# Patient Record
Sex: MALE | Race: WHITE | NOT HISPANIC OR LATINO | Employment: FULL TIME | URBAN - METROPOLITAN AREA
[De-identification: names, ages, dates, MRNs, and addresses within clinical notes are randomized per-mention and may not be internally consistent; named-entity substitution may affect disease eponyms.]

---

## 2017-02-28 ENCOUNTER — HOSPITAL ENCOUNTER (EMERGENCY)
Facility: HOSPITAL | Age: 37
Discharge: HOME | End: 2017-02-28
Admitting: EMERGENCY MEDICINE
Payer: COMMERCIAL

## 2017-02-28 ENCOUNTER — APPOINTMENT (EMERGENCY)
Dept: RADIOLOGY | Facility: HOSPITAL | Age: 37
End: 2017-02-28
Payer: COMMERCIAL

## 2017-02-28 VITALS
TEMPERATURE: 98.2 F | DIASTOLIC BLOOD PRESSURE: 78 MMHG | RESPIRATION RATE: 19 BRPM | OXYGEN SATURATION: 100 % | SYSTOLIC BLOOD PRESSURE: 135 MMHG | HEART RATE: 81 BPM | WEIGHT: 218.03 LBS

## 2017-02-28 DIAGNOSIS — R07.89 CHEST WALL PAIN: Primary | ICD-10-CM

## 2017-02-28 LAB
ATRIAL RATE: 71 BPM
P AXIS: 50 DEGREES
PR INTERVAL: 142 MS
QRS AXIS: 24 DEGREES
QRSD INTERVAL: 84 MS
QT INTERVAL: 400 MS
QTC INTERVAL: 434 MS
T WAVE AXIS: 14 DEGREES
VENTRICULAR RATE: 71 BPM

## 2017-02-28 PROCEDURE — 93005 ELECTROCARDIOGRAM TRACING: CPT

## 2017-02-28 PROCEDURE — 99285 EMERGENCY DEPT VISIT HI MDM: CPT

## 2017-02-28 PROCEDURE — 71020 HB CHEST X-RAY 2VW FRONTAL&LATL: CPT

## 2017-02-28 PROCEDURE — 93005 ELECTROCARDIOGRAM TRACING: CPT | Performed by: PHYSICIAN ASSISTANT

## 2017-02-28 RX ORDER — CLONAZEPAM 0.5 MG/1
0.5 TABLET ORAL 2 TIMES DAILY PRN
COMMUNITY
End: 2018-02-26 | Stop reason: SDUPTHER

## 2017-02-28 RX ORDER — ESCITALOPRAM OXALATE 20 MG/1
20 TABLET ORAL DAILY
COMMUNITY
End: 2018-05-02 | Stop reason: SDUPTHER

## 2017-02-28 RX ORDER — OMEPRAZOLE 20 MG/1
20 CAPSULE, DELAYED RELEASE ORAL DAILY
Qty: 30 CAPSULE | Refills: 0 | Status: SHIPPED | OUTPATIENT
Start: 2017-02-28 | End: 2018-05-02 | Stop reason: CLARIF

## 2017-09-05 ENCOUNTER — GENERIC CONVERSION - ENCOUNTER (OUTPATIENT)
Dept: OTHER | Facility: OTHER | Age: 37
End: 2017-09-05

## 2017-10-24 ENCOUNTER — ALLSCRIPTS OFFICE VISIT (OUTPATIENT)
Dept: OTHER | Facility: OTHER | Age: 37
End: 2017-10-24

## 2017-10-24 DIAGNOSIS — R07.89 OTHER CHEST PAIN: ICD-10-CM

## 2017-10-24 DIAGNOSIS — F41.9 ANXIETY DISORDER: ICD-10-CM

## 2017-10-24 DIAGNOSIS — E78.2 MIXED HYPERLIPIDEMIA: ICD-10-CM

## 2017-10-25 NOTE — PROGRESS NOTES
Assessment  1  Hyperlipemia, mixed (272 2) (E78 2)   2  Anxiety disorder (300 00) (F41 9)   3  Chest wall pain (786 52) (R07 89)    Plan  Anxiety disorder    · (1) TSH; Status:Active; Requested [id number];    · (1) VITAMIN D 125-DIHYDROXY (CALCITRIOL); Status:Active; Requested [id number];   Chest wall pain    · (1) CK (CPK); Status:Active; Requested [id number];    · (1) TROPONIN I; Status:Active; Requested [id number];    · ECHO STRESS TEST W CONTRAST IF INDICATED; Status:Need Information - Financial  Authorization; Requested [id number];   Hyperlipemia, mixed    · (1) CBC/PLT/DIFF; Status:Active; Requested [id number];    · (1) COMPREHENSIVE METABOLIC PANEL; Status:Active; Requested [id number];    · (1) LIPID PANEL, FASTING; Status:Active; Requested [id number];    · (1) URINALYSIS (will reflex a microscopy if leukocytes, occult blood, protein or nitrites are  not within normal limits); Status:Active; Requested [id number];    · Follow-up visit in 2 weeks Evaluation and Treatment  Follow-up  Status: Hold For -  Scheduling  Requested for: 90KJM1290    Discussion/Summary  Discussion Summary:   Hldobtain base line labs , recommend dietary modifications , increase fiber , weight loss / exercise program , encouraged to research Mediterranean diet , discussed medicaitotns , omega 3's , fiber supplements, statin medications, reviewed goals of treatmentunder care of psych provider wall pain enl , discussed causative factors , muscle strain   Counseling Documentation With Imm: The patient was counseled regarding instructions for management,-- patient and family education,-- importance of compliance with treatment  Medication SE Review and Pt Understands Tx: Possible side effects of new medications were reviewed with the patient/guardian today  The treatment plan was reviewed with the patient/guardian   The patient/guardian understands and agrees with the treatment plan      Chief Complaint  Chief Complaint Free Text Note Form: - patient is here to establish today  History of Present Illness  HPI: here to establish in law of Reba Hays in the ER in feb for chest pain , here to f/u , did f/u on this in regard to chol  , in the past did have complete work up with echo, stress test all normal time checked chol yrs ago 3? was driving to work developed left sided chest pain again like in feb , admits was doing a bit of chainsaw work with ritu over the weekend, no problems then was definitely sore after cervantes, ,is reproducible with cough hand dominant , has not been a ledy , just had a miscarriage, has one child boy , has been stressful too , works in [employer] had anxiety issue since 25yrs old , sees psych in [place] goes regularly q 3m         Review of Systems  Complete-Male:   Constitutional: No fever or chills, feels well, no tiredness, no recent weight gain or weight loss  Eyes: No complaints of eye pain, no red eyes, no discharge from eyes, no itchy eyes  ENT: no complaints of earache, no hearing loss, no nosebleeds, no nasal discharge, no sore throat, no hoarseness  Cardiovascular: No complaints of slow heart rate, no fast heart rate, no chest pain, no palpitations, no leg claudication, no lower extremity  Respiratory: No complaints of shortness of breath, no wheezing, no cough, no SOB on exertion, no orthopnea or PND  Gastrointestinal: No complaints of abdominal pain, no constipation, no nausea or vomiting, no diarrhea or bloody stools  Genitourinary: No complaints of dysuria, no incontinence, no hesitancy, no nocturia, no genital lesion, no testicular pain  Musculoskeletal: No complaints of arthralgia, no myalgias, no joint swelling or stiffness, no limb pain or swelling  Integumentary: No complaints of skin rash or skin lesions, no itching, no skin wound, no dry skin     Neurological: No compliants of headache, no confusion, no convulsions, no numbness or tingling, no dizziness or fainting, no limb weakness, no difficulty walking  Psychiatric: Is not suicidal, no sleep disturbances, no anxiety or depression, no change in personality, no emotional problems  Endocrine: No complaints of proptosis, no hot flashes, no muscle weakness, no erectile dysfunction, no deepening of the voice, no feelings of weakness  Hematologic/Lymphatic: No complaints of swollen glands, no swollen glands in the neck, does not bleed easily, no easy bruising  ROS Reviewed:   ROS reviewed  Active Problems  1  Anxiety disorder (300 00) (F41 9)    Past Medical History  1  History of Frequent diarrhea (787 91) (R19 7)   2  History of back pain (V13 59) (Z87 39)   3  History of bloody stools (V12 79) (Z87 19)   4  History of hemorrhoids (V13 89) (Z87 19)  Active Problems And Past Medical History Reviewed: The active problems and past medical history were reviewed and updated today  Surgical History  Surgical History Reviewed: The surgical history was reviewed and updated today  Family History  Mother    1  Family history of hypertension (V17 49) (Z82 49)   2  Family history of High cholesterol  Grandmother    3  Family history of cardiac disorder (V17 49) (Z82 49)   4  Family history of hypertension (V17 49) (Z82 49)   5  Family history of High cholesterol  Grandfather    6  Family history of cardiac disorder (V17 49) (Z82 49)   7  Family history of hypertension (V17 49) (Z82 49)   8  Family history of High cholesterol  Family History Reviewed: The family history was reviewed and updated today  Social History   · Always uses seat belt   · Consumes alcohol occasionally (V49 89) (Z78 9)   · Daily caffeine consumption, 2-3 servings a day   · Employed   · Lives with children   · Lives with spouse   ·    · No illicit drug use   · Non smoker / tobacco user (V49 89) (Z78 9)  Social History Reviewed: The social history was reviewed and updated today   The social history was reviewed and is unchanged  Current Meds   1  KlonoPIN 0 5 MG Oral Tablet; TAKE 1 TABLET TWICE DAILY AS NEEDED; Therapy: (Recorded:09Ivv0767) to Recorded   2  Lexapro 20 MG Oral Tablet; TAKE 1 TABLET DAILY; Therapy: (Recorded:24Oct2017) to Recorded    Allergies  1  Penicillins  2  No Known Environmental Allergies   3  No Known Food Allergies    Vitals  Vital Signs    Recorded: 80ESH6531 01:35PM Recorded: 16GAM8492 12:58PM   Heart Rate  68   Respiration  16   Systolic 372 944   Diastolic 78 84   Height  5 ft 8 in   Weight  210 lb 6 oz   BMI Calculated  31 99   BSA Calculated  2 09   O2 Saturation  98     Physical Exam    Constitutional   General appearance: No acute distress, well appearing and well nourished  comfortable,-- overweight-- and-- appearance reflects stated age  Eyes   Conjunctiva and lids: No swelling, erythema, or discharge  Pupils and irises: Equal, round and reactive to light  Ears, Nose, Mouth, and Throat   External inspection of ears and nose: Normal     Nasal mucosa, septum, and turbinates: Normal without edema or erythema  Pulmonary   Respiratory effort: No increased work of breathing or signs of respiratory distress  Auscultation of lungs: Clear to auscultation, equal breath sounds bilaterally, no wheezes, no rales, no rhonci  Cardiovascular   Auscultation of heart: Normal rate and rhythm, normal S1 and S2, without murmurs  Carotid pulses: Normal     Abdomen   Abdomen: Non-tender, no masses  Liver and spleen: No hepatomegaly or splenomegaly  Lymphatic   Palpation of lymph nodes in neck: No lymphadenopathy  Musculoskeletal   Gait and station: Normal  -- pt tenderness left upper chest wall  Digits and nails: Normal without clubbing or cyanosis  Inspection/palpation of joints, bones, and muscles: Normal  -- FROM  Psychiatric   Orientation to person, place and time: Normal     Mood and affect: Normal          Signatures   Electronically signed by :  Madalyn Goldmann, CRNP; Oct 24 2017  2:00PM EST                       (Author)    Electronically signed by : MOLINA Abrams ; Oct 24 2017  2:33PM EST

## 2017-10-28 ENCOUNTER — APPOINTMENT (OUTPATIENT)
Dept: LAB | Facility: CLINIC | Age: 37
End: 2017-10-28
Payer: COMMERCIAL

## 2017-10-28 DIAGNOSIS — R07.89 OTHER CHEST PAIN: ICD-10-CM

## 2017-10-28 DIAGNOSIS — E78.2 MIXED HYPERLIPIDEMIA: ICD-10-CM

## 2017-10-28 DIAGNOSIS — F41.9 ANXIETY DISORDER: ICD-10-CM

## 2017-10-28 LAB
ALBUMIN SERPL BCP-MCNC: 4.1 G/DL (ref 3.5–5)
ALP SERPL-CCNC: 67 U/L (ref 46–116)
ALT SERPL W P-5'-P-CCNC: 38 U/L (ref 12–78)
ANION GAP SERPL CALCULATED.3IONS-SCNC: 6 MMOL/L (ref 4–13)
AST SERPL W P-5'-P-CCNC: 22 U/L (ref 5–45)
BACTERIA UR QL AUTO: ABNORMAL /HPF
BASOPHILS # BLD AUTO: 0.02 THOUSANDS/ΜL (ref 0–0.1)
BASOPHILS NFR BLD AUTO: 0 % (ref 0–1)
BILIRUB SERPL-MCNC: 0.25 MG/DL (ref 0.2–1)
BILIRUB UR QL STRIP: NEGATIVE
BUN SERPL-MCNC: 13 MG/DL (ref 5–25)
CALCIUM SERPL-MCNC: 8.9 MG/DL (ref 8.3–10.1)
CHLORIDE SERPL-SCNC: 106 MMOL/L (ref 100–108)
CHOLEST SERPL-MCNC: 155 MG/DL (ref 50–200)
CK SERPL-CCNC: 103 U/L (ref 39–308)
CLARITY UR: ABNORMAL
CO2 SERPL-SCNC: 26 MMOL/L (ref 21–32)
COLOR UR: YELLOW
CREAT SERPL-MCNC: 0.89 MG/DL (ref 0.6–1.3)
EOSINOPHIL # BLD AUTO: 1.15 THOUSAND/ΜL (ref 0–0.61)
EOSINOPHIL NFR BLD AUTO: 13 % (ref 0–6)
ERYTHROCYTE [DISTWIDTH] IN BLOOD BY AUTOMATED COUNT: 12 % (ref 11.6–15.1)
GFR SERPL CREATININE-BSD FRML MDRD: 111 ML/MIN/1.73SQ M
GLUCOSE P FAST SERPL-MCNC: 90 MG/DL (ref 65–99)
GLUCOSE UR STRIP-MCNC: NEGATIVE MG/DL
HCT VFR BLD AUTO: 43.7 % (ref 36.5–49.3)
HDLC SERPL-MCNC: 24 MG/DL (ref 40–60)
HGB BLD-MCNC: 15 G/DL (ref 12–17)
HGB UR QL STRIP.AUTO: ABNORMAL
HYALINE CASTS #/AREA URNS LPF: ABNORMAL /LPF
KETONES UR STRIP-MCNC: NEGATIVE MG/DL
LEUKOCYTE ESTERASE UR QL STRIP: NEGATIVE
LYMPHOCYTES # BLD AUTO: 3.23 THOUSANDS/ΜL (ref 0.6–4.47)
LYMPHOCYTES NFR BLD AUTO: 35 % (ref 14–44)
MCH RBC QN AUTO: 29.5 PG (ref 26.8–34.3)
MCHC RBC AUTO-ENTMCNC: 34.3 G/DL (ref 31.4–37.4)
MCV RBC AUTO: 86 FL (ref 82–98)
MONOCYTES # BLD AUTO: 0.49 THOUSAND/ΜL (ref 0.17–1.22)
MONOCYTES NFR BLD AUTO: 5 % (ref 4–12)
NEUTROPHILS # BLD AUTO: 4.27 THOUSANDS/ΜL (ref 1.85–7.62)
NEUTS SEG NFR BLD AUTO: 47 % (ref 43–75)
NITRITE UR QL STRIP: NEGATIVE
NON-SQ EPI CELLS URNS QL MICRO: ABNORMAL /HPF
NRBC BLD AUTO-RTO: 0 /100 WBCS
PH UR STRIP.AUTO: 6 [PH] (ref 4.5–8)
PLATELET # BLD AUTO: 285 THOUSANDS/UL (ref 149–390)
PMV BLD AUTO: 10.8 FL (ref 8.9–12.7)
POTASSIUM SERPL-SCNC: 4.1 MMOL/L (ref 3.5–5.3)
PROT SERPL-MCNC: 7.9 G/DL (ref 6.4–8.2)
PROT UR STRIP-MCNC: NEGATIVE MG/DL
RBC # BLD AUTO: 5.09 MILLION/UL (ref 3.88–5.62)
RBC #/AREA URNS AUTO: ABNORMAL /HPF
SODIUM SERPL-SCNC: 138 MMOL/L (ref 136–145)
SP GR UR STRIP.AUTO: 1.02 (ref 1–1.03)
TRIGL SERPL-MCNC: 474 MG/DL
TROPONIN I SERPL-MCNC: <0.02 NG/ML
TSH SERPL DL<=0.05 MIU/L-ACNC: 1.67 UIU/ML (ref 0.36–3.74)
UROBILINOGEN UR QL STRIP.AUTO: 0.2 E.U./DL
WBC # BLD AUTO: 9.18 THOUSAND/UL (ref 4.31–10.16)
WBC #/AREA URNS AUTO: ABNORMAL /HPF

## 2017-10-28 PROCEDURE — 80061 LIPID PANEL: CPT

## 2017-10-28 PROCEDURE — 36415 COLL VENOUS BLD VENIPUNCTURE: CPT

## 2017-10-28 PROCEDURE — 82652 VIT D 1 25-DIHYDROXY: CPT

## 2017-10-28 PROCEDURE — 80053 COMPREHEN METABOLIC PANEL: CPT

## 2017-10-28 PROCEDURE — 81001 URINALYSIS AUTO W/SCOPE: CPT

## 2017-10-28 PROCEDURE — 84484 ASSAY OF TROPONIN QUANT: CPT

## 2017-10-28 PROCEDURE — 85025 COMPLETE CBC W/AUTO DIFF WBC: CPT

## 2017-10-28 PROCEDURE — 84443 ASSAY THYROID STIM HORMONE: CPT

## 2017-10-28 PROCEDURE — 82550 ASSAY OF CK (CPK): CPT

## 2017-10-31 ENCOUNTER — GENERIC CONVERSION - ENCOUNTER (OUTPATIENT)
Dept: OTHER | Facility: OTHER | Age: 37
End: 2017-10-31

## 2017-10-31 LAB — 1,25(OH)2D3 SERPL-MCNC: 34.6 PG/ML (ref 19.9–79.3)

## 2017-11-01 ENCOUNTER — GENERIC CONVERSION - ENCOUNTER (OUTPATIENT)
Dept: OTHER | Facility: OTHER | Age: 37
End: 2017-11-01

## 2017-11-07 ENCOUNTER — GENERIC CONVERSION - ENCOUNTER (OUTPATIENT)
Dept: OTHER | Facility: OTHER | Age: 37
End: 2017-11-07

## 2017-11-15 ENCOUNTER — GENERIC CONVERSION - ENCOUNTER (OUTPATIENT)
Dept: OTHER | Facility: OTHER | Age: 37
End: 2017-11-15

## 2017-12-14 ENCOUNTER — GENERIC CONVERSION - ENCOUNTER (OUTPATIENT)
Dept: OTHER | Facility: OTHER | Age: 37
End: 2017-12-14

## 2018-01-11 NOTE — RESULT NOTES
Verified Results  (1) COMPREHENSIVE METABOLIC PANEL 58SBS2198 88:08MM Humaira Brown Order Number: [id number]     Test Name Result Flag Reference   SODIUM 138 mmol/L  136-145   POTASSIUM 4 1 mmol/L  3 5-5 3   CHLORIDE 106 mmol/L  100-108   CARBON DIOXIDE 26 mmol/L  21-32   ANION GAP (CALC) 6 mmol/L  4-13   BLOOD UREA NITROGEN 13 mg/dL  5-25   CREATININE 0 89 mg/dL  0 60-1 30   Standardized to IDMS reference method   CALCIUM 8 9 mg/dL  8 3-10 1   BILI, TOTAL 0 25 mg/dL  0 20-1 00   ALK PHOSPHATAS 67 U/L     ALT (SGPT) 38 U/L  12-78   Specimen collection should occur prior to Sulfasalazine and/or Sulfapyridine administration due to the potential for falsely depressed results  AST(SGOT) 22 U/L  5-45   Specimen collection should occur prior to Sulfasalazine administration due to the potential for falsely depressed results  ALBUMIN 4 1 g/dL  3 5-5 0   TOTAL PROTEIN 7 9 g/dL  6 4-8 2   eGFR 111 ml/min/1 73sq [facility] Disease Education Program recommendations are as follows:  GFR calculation is accurate only with a steady state creatinine  Chronic Kidney disease less than 60 ml/min/1 73 sq  meters  Kidney failure less than 15 ml/min/1 73 sq  meters  GLUCOSE FASTING 90 mg/dL  65-99   Specimen collection should occur prior to Sulfasalazine administration due to the potential for falsely depressed results  Specimen collection should occur prior to Sulfapyridine administration due to the potential for falsely elevated results  (1) LIPID PANEL, FASTING 58ZYS4923 11:46AM Humaira Brown Order Number: [id number]     Test Name Result Flag Reference   CHOLESTEROL 155 mg/dL     HDL,DIRECT 24 mg/dL L 40-60   Specimen collection should occur prior to Metamizole administration due to the potential for falsley depressed results     LDL CHOLESTEROL CALCULATED (Report)  0-100   Calculated LDL invalid, triglycerides >400 mg/dl      Triglyceride:      Normal <150 mg/dl Borderline High 150-199 mg/dl   High 200-499 mg/dl   Very High >499 mg/dl      Cholesterol:     Desirable <200 mg/dl    Borderline High 200-239 mg/dl    High >239 mg/dl      HDL Cholesterol:     High>59 mg/dL    Low <41 mg/dL   Calculated LDL invalid, triglycerides >400 mg/dl      Triglyceride:        Normal <150 mg/dl   Borderline High 150-199 mg/dl   High 200-499 mg/dl   Very High >499 mg/dl      Cholesterol:       Desirable <200 mg/dl    Borderline High 200-239 mg/dl    High >239 mg/dl      HDL Cholesterol:       High>59 mg/dL    Low <41 mg/dL   TRIGLYCERIDES 474 mg/dL H <=150   Specimen collection should occur prior to N-Acetylcysteine or Metamizole administration due to the potential for falsely depressed results  (1) CBC/PLT/DIFF 28Oct2017 11:46AM Ted Chaudhary Order Number: [id number]     Test Name Result Flag Reference   WBC COUNT 9 18 Thousand/uL  4 31-10 16   RBC COUNT 5 09 Million/uL  3 88-5 62   HEMOGLOBIN 15 0 g/dL  12 0-17 0   HEMATOCRIT 43 7 %  36 5-49 3   MCV 86 fL  82-98   MCH 29 5 pg  26 8-34 3   MCHC 34 3 g/dL  31 4-37 4   RDW 12 0 %  11 6-15 1   MPV 10 8 fL  8 9-12 7   PLATELET COUNT 583 Thousands/uL  149-390   nRBC AUTOMATED 0 /100 WBCs     NEUTROPHILS RELATIVE PERCENT 47 %  43-75   LYMPHOCYTES RELATIVE PERCENT 35 %  14-44   MONOCYTES RELATIVE PERCENT 5 %  4-12   EOSINOPHILS RELATIVE PERCENT 13 % H 0-6   BASOPHILS RELATIVE PERCENT 0 %  0-1   NEUTROPHILS ABSOLUTE COUNT 4 27 Thousands/? ??L  1 85-7 62   LYMPHOCYTES ABSOLUTE COUNT 3 23 Thousands/? ??L  0 60-4 47   MONOCYTES ABSOLUTE COUNT 0 49 Thousand/? ??L  0 17-1 22   EOSINOPHILS ABSOLUTE COUNT 1 15 Thousand/? ??L H 0 00-0 61   BASOPHILS ABSOLUTE COUNT 0 02 Thousands/? ??L  0 00-0 10     (1) URINALYSIS (will reflex a microscopy if leukocytes, occult blood, protein or nitrites are not within normal limits) 28Oct2017 11:46AM Ted Chaudhary Order Number: [id number]     Test Name Result Flag Reference   COLOR Yellow CLARITY Turbid     SPECIFIC GRAVITY UA 1 025  1 003-1 030   PH UA 6 0  4 5-8 0   LEUKOCYTE ESTERASE UA Negative  Negative   NITRITE UA Negative  Negative   PROTEIN UA Negative mg/dl  Negative   GLUCOSE UA Negative mg/dl  Negative   KETONES UA Negative mg/dl  Negative   UROBILINOGEN UA 0 2 E U /dl  0 2, 1 0 E U /dl   BILIRUBIN UA Negative  Negative   BLOOD UA Trace A Negative   BACTERIA None Seen /hpf  None Seen, Occasional   EPITHELIAL CELLS None Seen /hpf  None Seen, Occasional   HYALINE CASTS None Seen /lpf  None Seen   RBC UA 2-4 /hpf A None Seen, 0-5   WBC UA None Seen /hpf  None Seen, 0-5, 5-55, 5-65     (1) TSH 28Oct2017 11:46AM Elinda Held Order Number: [id number]     Test Name Result Flag Reference   TSH 1 670 uIU/mL  0 358-3 740   Patients undergoing fluorescein dye angiography may retain small amounts of fluorescein in the body for 48-72 hours post procedure  Samples containing fluorescein can produce falsely depressed TSH values  If the patient had this procedure,a specimen should be resubmitted post fluorescein clearance  (1) TROPONIN I 28Oct2017 11:46AM Elinda Held Order Number: [id number]     Test Name Result Flag Reference   TROPONIN I <0 02 ng/mL  <=0 04   [employer] Chemistry analyzer 99% cutoff is > 0 04 ng/mL in network labs    o cTnI 99% cutoff is useful only when applied to patients in the clinical setting of myocardial ischemia  o cTnI 99% cutoff should be interpreted in the context of clinical history, ECG findings and possibly cardiac imaging to establish correct diagnosis  o cTnI 99% cutoff may be suggestive but clearly not indicative of a coronary event without the clinical setting of myocardial ischemia       (1) CK (CPK) 28Oct2017 11:46AM Elinda Held Order Number: [id number]     Test Name Result Flag Reference   CK (CPK) 103 U/L

## 2018-01-13 VITALS
HEIGHT: 68 IN | HEART RATE: 68 BPM | DIASTOLIC BLOOD PRESSURE: 78 MMHG | OXYGEN SATURATION: 98 % | BODY MASS INDEX: 31.89 KG/M2 | RESPIRATION RATE: 16 BRPM | SYSTOLIC BLOOD PRESSURE: 112 MMHG | WEIGHT: 210.38 LBS

## 2018-01-15 ENCOUNTER — ALLSCRIPTS OFFICE VISIT (OUTPATIENT)
Dept: OTHER | Facility: OTHER | Age: 38
End: 2018-01-15

## 2018-01-16 NOTE — PROGRESS NOTES
Assessment   1  Anxiety disorder (300 00) (F41 9)   2  Hyperlipemia, mixed (272 2) (E78 2)    Plan   Health Maintenance    · Stop: Influenza    Discussion/Summary      Anxiety next plan of care encouraged to continue Lexapro, continues to work at decreasing the need for the clonazepam, given number for therapist need area will need to contact  labs at length , recommend dietary modifications , increase fiber , weight loss / exercise program , encouraged to research Mediterranean diet , discussed medicaitotns , omega 3's , fiber supplements, statin medications, reviewed goals of treatment      The patient was counseled regarding instructions for management,-- patient and family education,-- importance of compliance with treatment  Possible side effects of new medications were reviewed with the patient/guardian today  The treatment plan was reviewed with the patient/guardian  The patient/guardian understands and agrees with the treatment plan      Chief Complaint   Patient is in the office today for a follow up visit  History of Present Illness   Here to follow up on anxiety been taking med , down to twice a day , has been only cutting morning dose to half a tab  to take Lexapro not had contacted a counselor or therapist to the lack of time overall improvement, happy that the need for the Klonopin is decreased feeling less depending, has bottle of meds not use for this months continues taking cholesterol medicine this is due to have blood work done next month negative issue with medication diet working on      Review of Systems        Constitutional: No fever or chills, feels well, no tiredness, no recent weight gain or weight loss  Eyes: No complaints of eye pain, no red eyes, no discharge from eyes, no itchy eyes  ENT: no complaints of earache, no hearing loss, no nosebleeds, no nasal discharge, no sore throat, no hoarseness        Cardiovascular: No complaints of slow heart rate, no fast heart rate, no chest pain, no palpitations, no leg claudication, no lower extremity  Respiratory: No complaints of shortness of breath, no wheezing, no cough, no SOB on exertion, no orthopnea or PND  Gastrointestinal: No complaints of abdominal pain, no constipation, no nausea or vomiting, no diarrhea or bloody stools  Genitourinary: No complaints of dysuria, no incontinence, no hesitancy, no nocturia, no genital lesion, no testicular pain  Musculoskeletal: No complaints of arthralgia, no myalgias, no joint swelling or stiffness, no limb pain or swelling  Integumentary: No complaints of skin rash or skin lesions, no itching, no skin wound, no dry skin  Neurological: No compliants of headache, no confusion, no convulsions, no numbness or tingling, no dizziness or fainting, no limb weakness, no difficulty walking  Psychiatric: Is not suicidal, no sleep disturbances, no anxiety or depression, no change in personality, no emotional problems  Endocrine: No complaints of proptosis, no hot flashes, no muscle weakness, no erectile dysfunction, no deepening of the voice, no feelings of weakness  Hematologic/Lymphatic: No complaints of swollen glands, no swollen glands in the neck, does not bleed easily, no easy bruising  ROS reviewed  Active Problems   1  Acute URI (465 9) (J06 9)   2  Anxiety disorder (300 00) (F41 9)   3  Chest wall pain (786 52) (R07 89)   4  Environmental allergies (V15 09) (Z91 09)   5  Hyperlipemia, mixed (272 2) (E78 2)    Past Medical History   1  History of Frequent diarrhea (787 91) (R19 7)   2  History of back pain (V13 59) (Z87 39)   3  History of bloody stools (V12 79) (Z87 19)   4  History of hemorrhoids (V13 89) (Z87 19)     The active problems and past medical history were reviewed and updated today  Surgical History      The surgical history was reviewed and updated today  Family History   Mother    1   Family history of hypertension (V17 49) (Z82 49)   2  Family history of High cholesterol  Grandmother    3  Family history of cardiac disorder (V17 49) (Z82 49)   4  Family history of hypertension (V17 49) (Z82 49)   5  Family history of High cholesterol  Grandfather    6  Family history of cardiac disorder (V17 49) (Z82 49)   7  Family history of hypertension (V17 49) (Z82 49)   8  Family history of High cholesterol     The family history was reviewed and updated today  Social History    · Always uses seat belt   · Consumes alcohol occasionally (V49 89) (Z78 9)   · Daily caffeine consumption, 2-3 servings a day   · Employed   · Former smoker (V15 82) (M69 145)   · Lives with children   · Lives with spouse   ·    · No illicit drug use   · Non smoker / tobacco user (V49 89) (Z78 9)  The social history was reviewed and updated today  The social history was reviewed and is unchanged  Current Meds    1  Atorvastatin Calcium 20 MG Oral Tablet; TAKE 1 TABLET AT BEDTIME; Therapy: 67NGI7360 to (Evaluate:25Jun2018)  Requested for: 46Iaj7269; Last     Rx:[id number] Ordered   2  ClonazePAM 0 5 MG Oral Tablet; TAKE 1 TABLET TWICE DAILY AS NEEDED; Last     Rx:[id number] Ordered   3  Escitalopram Oxalate 20 MG Oral Tablet; TAKE 1 TABLET DAILY  Requested for:     24RHB5653; Last Rx:[id number] Ordered   4  Fenofibrate Micronized 134 MG Oral Capsule; take 1 capsule daily; Therapy: 48XOI0745 to (Evaluate:12Suv0222)  Requested for: 99Pnk3827; Last     Rx:[id number] Ordered   5  Montelukast Sodium 10 MG Oral Tablet; TAKE 1 TABLET DAILY; Therapy: 77LFZ3208 to (Evaluate:25Jun2018)  Requested for: ; Last     Rx:[id number] Ordered     The medication list was reviewed and updated today  Allergies   1  Penicillins  2  No Known Environmental Allergies   3   No Known Food Allergies    Vitals   Vital Signs    Recorded: 04VJU0737 04:58PM   Temperature 97 3 F   Heart Rate 81   Respiration 16   Systolic 435   Diastolic 84 Height 5 ft 8 in   Weight 214 lb 2 oz   BMI Calculated 32 56   BSA Calculated 2 1   O2 Saturation 98     Physical Exam        Constitutional      General appearance: No acute distress, well appearing and well nourished  Eyes      Conjunctiva and lids: No swelling, erythema, or discharge  Pupils and irises: Equal, round and reactive to light  Ears, Nose, Mouth, and Throat      External inspection of ears and nose: Normal        Otoscopic examination: Tympanic membrance translucent with normal light reflex  Canals patent without erythema  Nasal mucosa, septum, and turbinates: Normal without edema or erythema  Oropharynx: Normal with no erythema, edema, exudate or lesions  Pulmonary      Respiratory effort: No increased work of breathing or signs of respiratory distress  Auscultation of lungs: Clear to auscultation, equal breath sounds bilaterally, no wheezes, no rales, no rhonci  Cardiovascular      Palpation of heart: Normal PMI, no thrills  Auscultation of heart: Normal rate and rhythm, normal S1 and S2, without murmurs  Examination of extremities for edema and/or varicosities: Normal        Carotid pulses: Normal        Abdomen      Abdomen: Non-tender, no masses  Liver and spleen: No hepatomegaly or splenomegaly  Lymphatic      Palpation of lymph nodes in neck: No lymphadenopathy  Musculoskeletal      Gait and station: Normal        Digits and nails: Normal without clubbing or cyanosis  Inspection/palpation of joints, bones, and muscles: Normal        Skin      Skin and subcutaneous tissue: Normal without rashes or lesions  Neurologic      Cranial nerves: Cranial nerves 2-12 intact  Reflexes: 2+ and symmetric  Sensation: No sensory loss         Psychiatric      Orientation to person, place and time: Normal        Mood and affect: Normal           Future Appointments      Date/Time Provider Specialty Site 03/14/2018 06:00 PM Lion Olvera, [address] [facility]     Signatures    Electronically signed by :  FEROZ Fatima; Jaren 15 2018  5:59PM EST                       (Author)     Electronically signed by : MOLINA Lynn ; Jaren 15 2018  7:02PM EST

## 2018-01-17 NOTE — RESULT NOTES
Verified Results  (1) VITAMIN D 125-DIHYDROXY (CALCITRIOL) 28Oct2017 11:46AM Alvina Poisson Order Number: [id number]     Test Name Result Flag Reference   [id number] 34 6 pg/mL  19 9 - 79 3   Performed at:  [address]  [id number]  : Chloe Sinclair MD, Phone:  [phone]

## 2018-01-22 VITALS
SYSTOLIC BLOOD PRESSURE: 124 MMHG | BODY MASS INDEX: 31.22 KG/M2 | RESPIRATION RATE: 16 BRPM | HEIGHT: 68 IN | TEMPERATURE: 97.6 F | WEIGHT: 206 LBS | DIASTOLIC BLOOD PRESSURE: 86 MMHG | OXYGEN SATURATION: 98 % | HEART RATE: 74 BPM

## 2018-01-22 VITALS
BODY MASS INDEX: 31.83 KG/M2 | OXYGEN SATURATION: 95 % | HEART RATE: 78 BPM | HEIGHT: 68 IN | RESPIRATION RATE: 20 BRPM | WEIGHT: 210.03 LBS | DIASTOLIC BLOOD PRESSURE: 86 MMHG | TEMPERATURE: 99.2 F | SYSTOLIC BLOOD PRESSURE: 128 MMHG

## 2018-01-23 VITALS
SYSTOLIC BLOOD PRESSURE: 130 MMHG | DIASTOLIC BLOOD PRESSURE: 84 MMHG | BODY MASS INDEX: 32.45 KG/M2 | HEIGHT: 68 IN | TEMPERATURE: 97.3 F | HEART RATE: 81 BPM | WEIGHT: 214.13 LBS | OXYGEN SATURATION: 98 % | RESPIRATION RATE: 16 BRPM

## 2018-01-24 VITALS
OXYGEN SATURATION: 97 % | HEART RATE: 65 BPM | HEIGHT: 68 IN | WEIGHT: 207 LBS | BODY MASS INDEX: 31.37 KG/M2 | TEMPERATURE: 98 F | SYSTOLIC BLOOD PRESSURE: 110 MMHG | DIASTOLIC BLOOD PRESSURE: 76 MMHG

## 2018-02-05 RX ORDER — CLONAZEPAM 0.5 MG/1
0.5 TABLET ORAL 3 TIMES DAILY PRN
Qty: 60 TABLET | Refills: 0 | OUTPATIENT
Start: 2018-02-05

## 2018-02-11 PROBLEM — E78.2 HYPERLIPEMIA, MIXED: Status: ACTIVE | Noted: 2017-10-24

## 2018-02-11 PROBLEM — F41.9 ANXIETY DISORDER: Status: ACTIVE | Noted: 2017-10-24

## 2018-02-26 ENCOUNTER — OFFICE VISIT (OUTPATIENT)
Dept: FAMILY MEDICINE CLINIC | Facility: CLINIC | Age: 38
End: 2018-02-26
Payer: COMMERCIAL

## 2018-02-26 VITALS
SYSTOLIC BLOOD PRESSURE: 128 MMHG | HEART RATE: 76 BPM | TEMPERATURE: 98.7 F | WEIGHT: 210 LBS | DIASTOLIC BLOOD PRESSURE: 80 MMHG | RESPIRATION RATE: 20 BRPM | BODY MASS INDEX: 32.96 KG/M2 | HEIGHT: 67 IN

## 2018-02-26 DIAGNOSIS — F41.9 ANXIETY: Primary | ICD-10-CM

## 2018-02-26 PROCEDURE — 99213 OFFICE O/P EST LOW 20 MIN: CPT | Performed by: FAMILY MEDICINE

## 2018-02-26 RX ORDER — CLONAZEPAM 0.5 MG/1
0.5 TABLET ORAL 2 TIMES DAILY PRN
Qty: 60 TABLET | Refills: 0 | Status: SHIPPED | OUTPATIENT
Start: 2018-02-26 | End: 2018-04-04 | Stop reason: SDUPTHER

## 2018-02-26 NOTE — PROGRESS NOTES
Assessment/Plan:    GUCCI   Improving , discussed plan of care , encoaruged to stay the course , encoaurged not to rapidly decrease med , discussed se profile   Hyperlipidemia  A written copy of a low fat, low cholesterol diet is included with this letter  To continue meds , will go for labs to eval treatment      There are no diagnoses linked to this encounter  Subjective:      Patient ID: Yoni Del Angel is a 30 y o  male  Chol has yet to go for labs , did hesitate , in starting meds , nerves   Has been taking the omega 3's ,   Has the klonapin, but has been lowering the amt taken and the number of pills over time , at one point did not even realize that I have been better   And has been more consistent with taking , and generally improving  gerd , no changes still with the meds  Neg cp , plap, sob  manan ,         The following portions of the patient's history were reviewed and updated as appropriate:   He has a past medical history of Anxiety and Hemorrhoids  ,   does not have any pertinent problems on file  ,   has a past surgical history that includes Hernia repair  ,  family history includes Heart disease in his other and other; Hyperlipidemia in his mother, other, and other; Hypertension in his mother, other, and other  ,   reports that he quit smoking about 4 years ago  His smoking use included Cigarettes  He has a 4 00 pack-year smoking history  His smokeless tobacco use includes Chew  He reports that he drinks alcohol  He reports that he uses drugs, including Marijuana  ,  is allergic to penicillins         Review of Systems   Constitutional: Negative for appetite change, chills, fever and unexpected weight change  HENT: Negative for congestion, dental problem, ear pain, hearing loss, postnasal drip, rhinorrhea, sinus pain, sinus pressure, sneezing, sore throat, tinnitus and voice change  Eyes: Negative for visual disturbance     Respiratory: Negative for apnea, cough, chest tightness and shortness of breath  Cardiovascular: Negative for chest pain, palpitations and leg swelling  Gastrointestinal: Negative for abdominal pain, blood in stool, constipation, diarrhea, nausea and vomiting  Endocrine: Negative for cold intolerance, heat intolerance, polydipsia, polyphagia and polyuria  Genitourinary: Negative for decreased urine volume, difficulty urinating, dysuria, frequency and hematuria  Musculoskeletal: Negative for arthralgias, back pain, gait problem, joint swelling and myalgias  Skin: Negative for color change, rash and wound  Allergic/Immunologic: Negative for environmental allergies and food allergies  Neurological: Negative for dizziness, syncope, weakness, light-headedness, numbness and headaches  Hematological: Negative for adenopathy  Does not bruise/bleed easily  Psychiatric/Behavioral: Negative for sleep disturbance and suicidal ideas  The patient is not nervous/anxious  Objective:  Vitals:    02/26/18 1737   BP: 128/80   Pulse: 76   Resp: 20   Temp: 98 7 °F (37 1 °C)      Physical Exam   Constitutional: He is oriented to person, place, and time  He appears well-developed and well-nourished  HENT:   Head: Normocephalic and atraumatic  Eyes: EOM are normal    Neck: Normal range of motion  Neck supple  Cardiovascular: Normal rate, regular rhythm and normal heart sounds  Pulmonary/Chest: Effort normal and breath sounds normal    Abdominal: Soft  Bowel sounds are normal    Musculoskeletal: Normal range of motion  Neurological: He is alert and oriented to person, place, and time  He has normal reflexes  Skin: Skin is warm and dry  Psychiatric: He has a normal mood and affect   His behavior is normal  Judgment and thought content normal

## 2018-04-04 DIAGNOSIS — F41.9 ANXIETY: ICD-10-CM

## 2018-04-04 RX ORDER — CLONAZEPAM 0.5 MG/1
0.5 TABLET ORAL 2 TIMES DAILY PRN
Qty: 45 TABLET | Refills: 0 | Status: SHIPPED | OUTPATIENT
Start: 2018-04-04 | End: 2018-04-05 | Stop reason: SDUPTHER

## 2018-04-04 RX ORDER — CLONAZEPAM 0.5 MG/1
0.5 TABLET ORAL 2 TIMES DAILY PRN
Qty: 60 TABLET | Refills: 3 | Status: SHIPPED | OUTPATIENT
Start: 2018-04-04 | End: 2018-04-04 | Stop reason: SDUPTHER

## 2018-04-05 DIAGNOSIS — F41.9 ANXIETY: ICD-10-CM

## 2018-04-05 RX ORDER — CLONAZEPAM 0.5 MG/1
0.5 TABLET ORAL 2 TIMES DAILY PRN
Qty: 45 TABLET | Refills: 0 | Status: SHIPPED | OUTPATIENT
Start: 2018-04-05 | End: 2018-05-02 | Stop reason: SDUPTHER

## 2018-05-02 ENCOUNTER — OFFICE VISIT (OUTPATIENT)
Dept: FAMILY MEDICINE CLINIC | Facility: CLINIC | Age: 38
End: 2018-05-02
Payer: COMMERCIAL

## 2018-05-02 VITALS
WEIGHT: 211 LBS | OXYGEN SATURATION: 98 % | SYSTOLIC BLOOD PRESSURE: 122 MMHG | DIASTOLIC BLOOD PRESSURE: 70 MMHG | HEART RATE: 78 BPM | BODY MASS INDEX: 33.12 KG/M2 | HEIGHT: 67 IN

## 2018-05-02 DIAGNOSIS — E78.2 MIXED HYPERLIPIDEMIA: ICD-10-CM

## 2018-05-02 DIAGNOSIS — J30.1 SEASONAL ALLERGIC RHINITIS DUE TO POLLEN: ICD-10-CM

## 2018-05-02 DIAGNOSIS — F41.1 GAD (GENERALIZED ANXIETY DISORDER): Primary | ICD-10-CM

## 2018-05-02 DIAGNOSIS — F41.9 ANXIETY: ICD-10-CM

## 2018-05-02 PROCEDURE — 99214 OFFICE O/P EST MOD 30 MIN: CPT | Performed by: FAMILY MEDICINE

## 2018-05-02 RX ORDER — CLONAZEPAM 0.5 MG/1
0.5 TABLET ORAL 2 TIMES DAILY PRN
Qty: 45 TABLET | Refills: 0 | Status: SHIPPED | OUTPATIENT
Start: 2018-05-02 | End: 2018-05-02 | Stop reason: SDUPTHER

## 2018-05-02 RX ORDER — ESCITALOPRAM OXALATE 20 MG/1
20 TABLET ORAL DAILY
Qty: 90 TABLET | Refills: 1 | Status: SHIPPED | OUTPATIENT
Start: 2018-05-02 | End: 2019-08-07 | Stop reason: SDUPTHER

## 2018-05-02 RX ORDER — CLONAZEPAM 0.5 MG/1
0.5 TABLET ORAL 2 TIMES DAILY PRN
Qty: 45 TABLET | Refills: 0 | Status: SHIPPED | OUTPATIENT
Start: 2018-05-02 | End: 2018-06-04 | Stop reason: SDUPTHER

## 2018-05-02 NOTE — PROGRESS NOTES
Assessment/Plan:   general anxiety disorder  Stable with current regimen to continue with current medications continue to discussed weaning down on the use of short-acting angiolytics, encourage continue pill counting, discussed continued alternative means of stress management  Hyperlipidemia  Stable improving continue stressed the importance of diet modification meal planning to continue current medications  Seasonal environmental allergies   Discussed plan of care use of over-the-counter antihistamine/nasal sprays    Here to f/u         Subjective:      Patient ID: Jory Hawthorne is a 30 y o  male  Here to f/u on the anxiety   Thinks been doing pretty well, Has become very aware of use of  Clonazepam, has been cutting the tablets in half using approximately two per day but not on a regular basis, actually has a number of tablets left over from last month has been doing own self counting, find some reassurance in knowing that each month been getting better, has become increasingly aware of side effects of the anxiety med clonazepam, feels tired, does not like, feels the  Lexapro has been a good addition  In regard to  Cholesterol continues to work at diet anemia plan continues with current medicines  Allergies fairly well controlled using over-the-counter antihistamines as needed, then rough season so far it has only  [place]  Denies chest pain palpitations shortness of breath difficulty breathing, negative anxiety or panic attacks, has been dramatically improved able to deal with coworkers scheduling public on a much more consistent basis          The following portions of the patient's history were reviewed and updated as appropriate:   He has a past medical history of Anxiety and Hemorrhoids  ,   does not have any pertinent problems on file  ,   has a past surgical history that includes Hernia repair  ,  family history includes Heart disease in his other and other; Hyperlipidemia in his mother, other, and other; Hypertension in his mother, other, and other  ,   reports that he quit smoking about 4 years ago  His smoking use included Cigarettes  He has a 4 00 pack-year smoking history  His smokeless tobacco use includes Chew  He reports that he drinks alcohol  He reports that he uses drugs, including Marijuana  ,  is allergic to penicillins         Review of Systems   Constitutional: Negative for appetite change, chills, fever and unexpected weight change  HENT: Negative for congestion, dental problem, ear pain, hearing loss, postnasal drip, rhinorrhea, sinus pain, sinus pressure, sneezing, sore throat, tinnitus and voice change  Eyes: Negative for visual disturbance  Respiratory: Negative for apnea, cough, chest tightness and shortness of breath  Cardiovascular: Negative for chest pain, palpitations and leg swelling  Gastrointestinal: Negative for abdominal pain, blood in stool, constipation, diarrhea, nausea and vomiting  Endocrine: Negative for cold intolerance, heat intolerance, polydipsia, polyphagia and polyuria  Genitourinary: Negative for decreased urine volume, difficulty urinating, dysuria, frequency and hematuria  Musculoskeletal: Negative for arthralgias, back pain, gait problem, joint swelling and myalgias  Skin: Negative for color change, rash and wound  Allergic/Immunologic: Negative for environmental allergies and food allergies  Neurological: Negative for dizziness, syncope, weakness, light-headedness, numbness and headaches  Hematological: Negative for adenopathy  Does not bruise/bleed easily  Psychiatric/Behavioral: Negative for sleep disturbance and suicidal ideas  The patient is not nervous/anxious  Objective:  Vitals:    05/02/18 1718   BP: 122/70   Pulse: 78   SpO2: 98%      Physical Exam   Constitutional: He is oriented to person, place, and time  He appears well-developed and well-nourished  HENT:   Head: Normocephalic and atraumatic     Eyes: EOM are normal  Neck: Normal range of motion  Neck supple  Cardiovascular: Normal rate, regular rhythm and normal heart sounds  Pulmonary/Chest: Effort normal and breath sounds normal    Musculoskeletal: Normal range of motion  Neurological: He is alert and oriented to person, place, and time  He has normal reflexes  Skin: Skin is warm and dry  Psychiatric: He has a normal mood and affect   His behavior is normal  Judgment and thought content normal

## 2018-06-04 DIAGNOSIS — F41.9 ANXIETY: ICD-10-CM

## 2018-06-05 RX ORDER — CLONAZEPAM 0.5 MG/1
0.5 TABLET ORAL 2 TIMES DAILY PRN
Qty: 45 TABLET | Refills: 0 | Status: SHIPPED | OUTPATIENT
Start: 2018-06-05 | End: 2018-06-06 | Stop reason: SDUPTHER

## 2018-06-06 DIAGNOSIS — F41.9 ANXIETY: ICD-10-CM

## 2018-06-07 RX ORDER — CLONAZEPAM 0.5 MG/1
0.5 TABLET ORAL 2 TIMES DAILY PRN
Qty: 45 TABLET | Refills: 0 | Status: SHIPPED | OUTPATIENT
Start: 2018-06-07 | End: 2018-07-16 | Stop reason: SDUPTHER

## 2018-07-10 DIAGNOSIS — F41.9 ANXIETY: ICD-10-CM

## 2018-07-10 RX ORDER — CLONAZEPAM 0.5 MG/1
0.5 TABLET ORAL 2 TIMES DAILY PRN
Qty: 45 TABLET | Refills: 0 | Status: CANCELLED | OUTPATIENT
Start: 2018-07-10

## 2018-07-13 DIAGNOSIS — F41.9 ANXIETY: ICD-10-CM

## 2018-07-13 NOTE — TELEPHONE ENCOUNTER
Pt called requesting refill he is completely out he requested the med on 7/10 and never heard back     Please advise

## 2018-07-14 RX ORDER — CLONAZEPAM 0.5 MG/1
0.5 TABLET ORAL 2 TIMES DAILY PRN
Qty: 45 TABLET | Refills: 0 | OUTPATIENT
Start: 2018-07-14

## 2018-07-16 RX ORDER — CLONAZEPAM 0.5 MG/1
0.5 TABLET ORAL 2 TIMES DAILY PRN
Qty: 45 TABLET | Refills: 0 | Status: SHIPPED | OUTPATIENT
Start: 2018-07-16 | End: 2018-08-22 | Stop reason: SDUPTHER

## 2018-08-22 DIAGNOSIS — F41.9 ANXIETY: ICD-10-CM

## 2018-08-22 RX ORDER — CLONAZEPAM 0.5 MG/1
0.5 TABLET ORAL 2 TIMES DAILY PRN
Qty: 45 TABLET | Refills: 0 | Status: SHIPPED | OUTPATIENT
Start: 2018-08-22 | End: 2018-09-11 | Stop reason: SDUPTHER

## 2018-09-11 ENCOUNTER — OFFICE VISIT (OUTPATIENT)
Dept: FAMILY MEDICINE CLINIC | Facility: CLINIC | Age: 38
End: 2018-09-11
Payer: COMMERCIAL

## 2018-09-11 VITALS
OXYGEN SATURATION: 98 % | HEIGHT: 67 IN | BODY MASS INDEX: 33.12 KG/M2 | WEIGHT: 211 LBS | TEMPERATURE: 98.2 F | DIASTOLIC BLOOD PRESSURE: 78 MMHG | SYSTOLIC BLOOD PRESSURE: 122 MMHG | HEART RATE: 73 BPM

## 2018-09-11 DIAGNOSIS — E78.2 MIXED HYPERLIPIDEMIA: ICD-10-CM

## 2018-09-11 DIAGNOSIS — F41.1 GAD (GENERALIZED ANXIETY DISORDER): Primary | ICD-10-CM

## 2018-09-11 DIAGNOSIS — F41.9 ANXIETY: ICD-10-CM

## 2018-09-11 DIAGNOSIS — K59.00 CONSTIPATION, UNSPECIFIED CONSTIPATION TYPE: ICD-10-CM

## 2018-09-11 PROCEDURE — 99214 OFFICE O/P EST MOD 30 MIN: CPT | Performed by: FAMILY MEDICINE

## 2018-09-11 PROCEDURE — 3008F BODY MASS INDEX DOCD: CPT | Performed by: FAMILY MEDICINE

## 2018-09-11 RX ORDER — ESCITALOPRAM OXALATE 10 MG/1
10 TABLET ORAL DAILY
Qty: 30 TABLET | Refills: 3 | Status: SHIPPED | OUTPATIENT
Start: 2018-09-11 | End: 2019-01-23 | Stop reason: ALTCHOICE

## 2018-09-11 RX ORDER — CLONAZEPAM 0.5 MG/1
0.5 TABLET ORAL 2 TIMES DAILY PRN
Qty: 45 TABLET | Refills: 0 | Status: SHIPPED | OUTPATIENT
Start: 2018-09-11 | End: 2018-10-11 | Stop reason: SDUPTHER

## 2018-09-11 NOTE — PROGRESS NOTES
Assessment/Plan:         Diagnoses and all orders for this visit:    GUCCI (generalized anxiety disorder)  -     escitalopram (LEXAPRO) 10 mg tablet; Take 1 tablet (10 mg total) by mouth daily    Mixed hyperlipidemia    Constipation, unspecified constipation type    Anxiety  -     clonazePAM (KlonoPIN) 0 5 mg tablet; Take 1 tablet (0 5 mg total) by mouth 2 (two) times a day as needed for anxiety or seizures       General anxiety disorder  Discussed plan of care previously on Lexapro 40 mg with Psychiatry specialty, would increase Lexapro to 30 mg and rate eval, encourage continue with counseling therapy to will continue with slow wean off clonazepam     Hyperlipidemia   To continue current medications will obtain updated lipid panel, stressed importance diet modification  Constipation   Discussed plan of care recommend increasing fluid intake, stressed importance of diet modification with regularly scheduled meals discussed over-the-counter medications such as MiraLax and Colace and their indications  Subjective:      Patient ID: Jory Hawthorne is a 41 y o  male  Has been constipated off and on  For the past , started to notice blood on toilet   No pain , but att times needs to stain   Diet pretty oor , misses breakfast , waits for to get to work to have coffee , will have salad for lunch , but probably has 60% of calories at night   Realizes poor   Anxiety   Has been going well ,   Wife is pregnant, grand father passed away the past month, pancreatic cancer  ,   In the past was on lexapro 40mg ,   Has been able to decrease frequ of the clonazpam    Cholesterol continues to take fenofibrate has yet to go for blood work diet has been questionable        Anxiety   Patient reports no chest pain, dizziness, nausea, nervous/anxious behavior, palpitations, shortness of breath or suicidal ideas             The following portions of the patient's history were reviewed and updated as appropriate:   He has a past medical history of Anxiety and Hemorrhoids  ,   does not have any pertinent problems on file  ,   has a past surgical history that includes Hernia repair  ,  family history includes Heart disease in his other and other; Hyperlipidemia in his mother, other, and other; Hypertension in his mother, other, and other  ,   reports that he quit smoking about 4 years ago  His smoking use included Cigarettes  He has a 4 00 pack-year smoking history  His smokeless tobacco use includes Chew  He reports that he drinks alcohol  He reports that he uses drugs, including Marijuana  ,  is allergic to penicillins         Review of Systems   Constitutional: Negative for appetite change, chills, fever and unexpected weight change  HENT: Negative for congestion, dental problem, ear pain, hearing loss, postnasal drip, rhinorrhea, sinus pain, sinus pressure, sneezing, sore throat, tinnitus and voice change  Eyes: Negative for visual disturbance  Respiratory: Negative for apnea, cough, chest tightness and shortness of breath  Cardiovascular: Negative for chest pain, palpitations and leg swelling  Gastrointestinal: Negative for abdominal pain, blood in stool, constipation, diarrhea, nausea and vomiting  Endocrine: Negative for cold intolerance, heat intolerance, polydipsia, polyphagia and polyuria  Genitourinary: Negative for decreased urine volume, difficulty urinating, dysuria, frequency and hematuria  Musculoskeletal: Negative for arthralgias, back pain, gait problem, joint swelling and myalgias  Skin: Negative for color change, rash and wound  Allergic/Immunologic: Negative for environmental allergies and food allergies  Neurological: Negative for dizziness, syncope, weakness, light-headedness, numbness and headaches  Hematological: Negative for adenopathy  Does not bruise/bleed easily  Psychiatric/Behavioral: Negative for self-injury, sleep disturbance and suicidal ideas  The patient is not nervous/anxious  Objective:  Vitals:    09/11/18 1019   BP: 122/78   Pulse: 73   Temp: 98 2 °F (36 8 °C)   SpO2: 98%      Physical Exam   Constitutional: He is oriented to person, place, and time  He appears well-developed and well-nourished  HENT:   Head: Normocephalic and atraumatic  Eyes: EOM are normal    Neck: Normal range of motion  Neck supple  Cardiovascular: Normal rate, regular rhythm and normal heart sounds  Pulmonary/Chest: Effort normal and breath sounds normal    Abdominal: Soft  Bowel sounds are normal    Genitourinary: Rectal exam shows guaiac negative stool  Musculoskeletal: Normal range of motion  Neurological: He is alert and oriented to person, place, and time  He has normal reflexes  Skin: Skin is warm and dry  Psychiatric: He has a normal mood and affect   His behavior is normal  Judgment and thought content normal

## 2018-09-23 DIAGNOSIS — E78.2 MIXED HYPERLIPIDEMIA: Primary | ICD-10-CM

## 2018-09-25 RX ORDER — FENOFIBRATE 134 MG/1
CAPSULE ORAL
Qty: 90 CAPSULE | Refills: 1 | Status: SHIPPED | OUTPATIENT
Start: 2018-09-25 | End: 2019-05-07 | Stop reason: SDUPTHER

## 2018-10-11 ENCOUNTER — OFFICE VISIT (OUTPATIENT)
Dept: FAMILY MEDICINE CLINIC | Facility: CLINIC | Age: 38
End: 2018-10-11
Payer: COMMERCIAL

## 2018-10-11 VITALS
OXYGEN SATURATION: 98 % | HEIGHT: 67 IN | WEIGHT: 208 LBS | DIASTOLIC BLOOD PRESSURE: 90 MMHG | BODY MASS INDEX: 32.65 KG/M2 | SYSTOLIC BLOOD PRESSURE: 138 MMHG | TEMPERATURE: 97.5 F | HEART RATE: 85 BPM

## 2018-10-11 DIAGNOSIS — F41.9 ANXIETY: Primary | ICD-10-CM

## 2018-10-11 PROCEDURE — 99213 OFFICE O/P EST LOW 20 MIN: CPT | Performed by: FAMILY MEDICINE

## 2018-10-11 RX ORDER — OLANZAPINE 5 MG/1
5 TABLET ORAL
Qty: 30 TABLET | Refills: 0 | Status: SHIPPED | OUTPATIENT
Start: 2018-10-11 | End: 2019-01-23 | Stop reason: ALTCHOICE

## 2018-10-11 RX ORDER — CLONAZEPAM 0.5 MG/1
0.5 TABLET ORAL 2 TIMES DAILY PRN
Qty: 45 TABLET | Refills: 0 | Status: SHIPPED | OUTPATIENT
Start: 2018-10-11 | End: 2018-11-12 | Stop reason: SDUPTHER

## 2018-10-11 NOTE — PROGRESS NOTES
Assessment/Plan:         Diagnoses and all orders for this visit:    Anxiety  -     OLANZapine (ZyPREXA) 5 mg tablet; Take 1 tablet (5 mg total) by mouth daily at bedtime  -     clonazePAM (KlonoPIN) 0 5 mg tablet; Take 1 tablet (0 5 mg total) by mouth 2 (two) times a day as needed for anxiety or seizures         discussed plan of care discussed bipolar disorder, will add on Zyprexa discussed mechanism action discussed safety in dosing  Pattern, truck call for any questions      Subjective:      Patient ID: Fariha Gomez is a 41 y o  male  Having issue at home , wife found to have sometype of antibody issue, , she is currently 18wks preg   So stress is up a bit , I could not find the rx for the labs that you wanted   Anxiety  Presently not seeing the therapist   What I want to go over with you today , 2 wks just panic , yelling and was out of control  With my boss   I thought I ws using extra clonipin but now that I am sitting with you realize the pill vcount is actually good? I think the mood swings are more so with the added stressors of the preg   Neg thought of h/s never           The following portions of the patient's history were reviewed and updated as appropriate:   He has a past medical history of Anxiety and Hemorrhoids  ,   does not have any pertinent problems on file  ,   has a past surgical history that includes Hernia repair  ,  family history includes Heart disease in his other and other; Hyperlipidemia in his mother, other, and other; Hypertension in his mother, other, and other  ,   reports that he quit smoking about 4 years ago  His smoking use included Cigarettes  He has a 4 00 pack-year smoking history  His smokeless tobacco use includes Chew  He reports that he drinks alcohol  He reports that he uses drugs, including Marijuana  ,  is allergic to penicillins         Review of Systems   Psychiatric/Behavioral: Positive for agitation and sleep disturbance   Negative for self-injury and suicidal ideas  The patient is nervous/anxious and is hyperactive  All other systems reviewed and are negative  Objective:  Vitals:    10/11/18 1731   BP: 138/90   Pulse: 85   Temp: 97 5 °F (36 4 °C)   SpO2: 98%      Physical Exam   Constitutional: He appears well-developed and well-nourished  No distress  HENT:   Head: Normocephalic and atraumatic  Eyes: Conjunctivae are normal  No scleral icterus  Neck: Normal range of motion  Cardiovascular: Normal rate  Pulmonary/Chest: Effort normal    Skin: Skin is warm  Psychiatric: He has a normal mood and affect  His speech is normal and behavior is normal  Judgment and thought content normal  Thought content is not paranoid  Cognition and memory are normal  He expresses no homicidal and no suicidal ideation  He expresses no suicidal plans and no homicidal plans

## 2018-11-09 DIAGNOSIS — F41.9 ANXIETY: ICD-10-CM

## 2018-11-12 RX ORDER — CLONAZEPAM 0.5 MG/1
0.5 TABLET ORAL 2 TIMES DAILY PRN
Qty: 45 TABLET | Refills: 0 | Status: SHIPPED | OUTPATIENT
Start: 2018-11-12 | End: 2018-12-06 | Stop reason: SDUPTHER

## 2018-12-06 DIAGNOSIS — F41.9 ANXIETY: ICD-10-CM

## 2018-12-06 RX ORDER — CLONAZEPAM 0.5 MG/1
0.5 TABLET ORAL 2 TIMES DAILY PRN
Qty: 45 TABLET | Refills: 0 | Status: SHIPPED | OUTPATIENT
Start: 2018-12-06 | End: 2019-01-10 | Stop reason: SDUPTHER

## 2019-01-09 DIAGNOSIS — F41.9 ANXIETY: ICD-10-CM

## 2019-01-10 RX ORDER — CLONAZEPAM 0.5 MG/1
0.5 TABLET ORAL 2 TIMES DAILY PRN
Qty: 45 TABLET | Refills: 0 | Status: SHIPPED | OUTPATIENT
Start: 2019-01-10 | End: 2019-02-08 | Stop reason: SDUPTHER

## 2019-01-23 ENCOUNTER — OFFICE VISIT (OUTPATIENT)
Dept: FAMILY MEDICINE CLINIC | Facility: CLINIC | Age: 39
End: 2019-01-23
Payer: COMMERCIAL

## 2019-01-23 VITALS
SYSTOLIC BLOOD PRESSURE: 130 MMHG | DIASTOLIC BLOOD PRESSURE: 88 MMHG | OXYGEN SATURATION: 97 % | BODY MASS INDEX: 32.18 KG/M2 | HEIGHT: 67 IN | WEIGHT: 205 LBS | HEART RATE: 86 BPM | TEMPERATURE: 98.3 F

## 2019-01-23 DIAGNOSIS — B34.9 VIRAL SYNDROME: Primary | ICD-10-CM

## 2019-01-23 PROCEDURE — 3008F BODY MASS INDEX DOCD: CPT | Performed by: FAMILY MEDICINE

## 2019-01-23 PROCEDURE — 99213 OFFICE O/P EST LOW 20 MIN: CPT | Performed by: FAMILY MEDICINE

## 2019-01-23 NOTE — PROGRESS NOTES
Assessment/Plan:         Diagnoses and all orders for this visit:    Viral syndrome          Increase oral hydration, warm tea with honey, increase nutrition   Adequate rest  Tylenol or Motrin for pain and/or fever  Nasal mist  Humidify room  Use decongestant- Mucinex  Zinc lozenges   Good handwashing    Subjective:      Patient ID: Jessica Bryan is a 41 y o  male  Left work early on Monday for n  V   Also with sinus pressure / no pain , nasal congestion , negative shortness of breath difficulty breathing, negative wheeze  Slight fever no number , negative mucus blood in bowel movements  Has been feeling slightly better from Sunday  Appetite fair watching diet  Tolerating fluids well  Taking over-the-counter medicines      Vomiting    There has been no fever  Associated symptoms include coughing, diarrhea, a fever and URI  Risk factors include ill contacts  Diarrhea    Associated symptoms include coughing, a fever, a URI and vomiting  Generalized Body Aches   Associated symptoms include congestion, a sore throat, a URI, a fever, coughing, diarrhea and vomiting  Cough   The current episode started yesterday  The problem has been gradually worsening  The cough is productive of sputum  Associated symptoms include ear congestion, a fever, nasal congestion, postnasal drip and a sore throat  He has tried nothing for the symptoms  The following portions of the patient's history were reviewed and updated as appropriate:   He has a past medical history of Anxiety and Hemorrhoids  ,   does not have any pertinent problems on file  ,   has a past surgical history that includes Hernia repair  ,  family history includes Heart disease in his other and other; Hyperlipidemia in his mother, other, and other; Hypertension in his mother, other, and other  ,   reports that he quit smoking about 5 years ago  His smoking use included Cigarettes  He has a 4 00 pack-year smoking history   His smokeless tobacco use includes Chew  He reports that he drinks alcohol  He reports that he uses drugs, including Marijuana  ,  is allergic to penicillins         Review of Systems   Constitutional: Positive for fever  HENT: Positive for congestion, postnasal drip and sore throat  Respiratory: Positive for cough  Gastrointestinal: Positive for diarrhea and vomiting  Genitourinary: Negative  Musculoskeletal: Negative  Hematological: Negative  Psychiatric/Behavioral: Negative  Objective:  Vitals:    01/23/19 1526   BP: 130/88   Pulse: 86   Temp: 98 3 °F (36 8 °C)   SpO2: 97%      Physical Exam   Constitutional: He is oriented to person, place, and time  He appears well-developed and well-nourished  No distress  HENT:   Head: Normocephalic and atraumatic  Right Ear: External ear normal    Left Ear: External ear normal    Mouth/Throat: No oropharyngeal exudate (Slightly erythemic heavy postnasal drip)  Negative sinus pressure pain, +nasal congestion   Eyes: Conjunctivae are normal  No scleral icterus  Neck: Normal range of motion  Neck supple  Cardiovascular: Normal rate, regular rhythm and normal heart sounds  No murmur heard  Pulmonary/Chest: Effort normal and breath sounds normal  No respiratory distress  He has no wheezes (Negative forced expiratory wheeze)  Abdominal: Soft  Bowel sounds are normal  He exhibits no mass  There is no tenderness  Musculoskeletal: Normal range of motion  Neurological: He is alert and oriented to person, place, and time  He has normal reflexes  Skin: Skin is warm and dry  Psychiatric: He has a normal mood and affect   His behavior is normal  Judgment and thought content normal

## 2019-02-08 DIAGNOSIS — F41.9 ANXIETY: ICD-10-CM

## 2019-02-11 RX ORDER — CLONAZEPAM 0.5 MG/1
0.5 TABLET ORAL 2 TIMES DAILY PRN
Qty: 45 TABLET | Refills: 0 | Status: SHIPPED | OUTPATIENT
Start: 2019-02-11 | End: 2019-03-12 | Stop reason: SDUPTHER

## 2019-03-12 DIAGNOSIS — F41.9 ANXIETY: ICD-10-CM

## 2019-03-12 RX ORDER — CLONAZEPAM 0.5 MG/1
0.5 TABLET ORAL 2 TIMES DAILY PRN
Qty: 45 TABLET | Refills: 0 | Status: SHIPPED | OUTPATIENT
Start: 2019-03-12 | End: 2019-04-11 | Stop reason: SDUPTHER

## 2019-04-04 ENCOUNTER — OFFICE VISIT (OUTPATIENT)
Dept: FAMILY MEDICINE CLINIC | Facility: CLINIC | Age: 39
End: 2019-04-04
Payer: COMMERCIAL

## 2019-04-04 VITALS
WEIGHT: 199.6 LBS | DIASTOLIC BLOOD PRESSURE: 72 MMHG | SYSTOLIC BLOOD PRESSURE: 112 MMHG | HEART RATE: 70 BPM | OXYGEN SATURATION: 98 % | BODY MASS INDEX: 31.33 KG/M2 | TEMPERATURE: 98.5 F | HEIGHT: 67 IN

## 2019-04-04 DIAGNOSIS — J30.1 SEASONAL ALLERGIC RHINITIS DUE TO POLLEN: ICD-10-CM

## 2019-04-04 DIAGNOSIS — F41.1 GAD (GENERALIZED ANXIETY DISORDER): Primary | ICD-10-CM

## 2019-04-04 DIAGNOSIS — E78.2 MIXED HYPERLIPIDEMIA: ICD-10-CM

## 2019-04-04 PROCEDURE — 99214 OFFICE O/P EST MOD 30 MIN: CPT | Performed by: FAMILY MEDICINE

## 2019-04-04 PROCEDURE — 3008F BODY MASS INDEX DOCD: CPT | Performed by: FAMILY MEDICINE

## 2019-04-11 DIAGNOSIS — F41.9 ANXIETY: ICD-10-CM

## 2019-04-11 RX ORDER — CLONAZEPAM 0.5 MG/1
0.5 TABLET ORAL 2 TIMES DAILY PRN
Qty: 45 TABLET | Refills: 0 | Status: SHIPPED | OUTPATIENT
Start: 2019-04-11 | End: 2019-05-23 | Stop reason: SDUPTHER

## 2019-05-07 DIAGNOSIS — E78.2 MIXED HYPERLIPIDEMIA: ICD-10-CM

## 2019-05-07 RX ORDER — FENOFIBRATE 134 MG/1
CAPSULE ORAL
Qty: 90 CAPSULE | Refills: 1 | Status: SHIPPED | OUTPATIENT
Start: 2019-05-07 | End: 2019-10-08

## 2019-05-23 DIAGNOSIS — F41.9 ANXIETY: ICD-10-CM

## 2019-05-23 RX ORDER — CLONAZEPAM 0.5 MG/1
0.5 TABLET ORAL 2 TIMES DAILY PRN
Qty: 45 TABLET | Refills: 0 | Status: SHIPPED | OUTPATIENT
Start: 2019-05-23 | End: 2019-07-08 | Stop reason: SDUPTHER

## 2019-07-08 DIAGNOSIS — F41.9 ANXIETY: ICD-10-CM

## 2019-07-08 RX ORDER — CLONAZEPAM 0.5 MG/1
0.5 TABLET ORAL 2 TIMES DAILY PRN
Qty: 45 TABLET | Refills: 0 | Status: SHIPPED | OUTPATIENT
Start: 2019-07-08 | End: 2019-08-07 | Stop reason: SDUPTHER

## 2019-08-07 DIAGNOSIS — F41.1 GAD (GENERALIZED ANXIETY DISORDER): ICD-10-CM

## 2019-08-07 DIAGNOSIS — F41.9 ANXIETY: ICD-10-CM

## 2019-08-08 RX ORDER — ESCITALOPRAM OXALATE 20 MG/1
20 TABLET ORAL DAILY
Qty: 90 TABLET | Refills: 1 | Status: SHIPPED | OUTPATIENT
Start: 2019-08-08 | End: 2020-02-10

## 2019-08-08 RX ORDER — CLONAZEPAM 0.5 MG/1
0.5 TABLET ORAL 2 TIMES DAILY PRN
Qty: 45 TABLET | Refills: 0 | Status: SHIPPED | OUTPATIENT
Start: 2019-08-08 | End: 2019-09-13 | Stop reason: SDUPTHER

## 2019-09-13 DIAGNOSIS — F41.9 ANXIETY: ICD-10-CM

## 2019-09-16 RX ORDER — CLONAZEPAM 0.5 MG/1
0.5 TABLET ORAL 2 TIMES DAILY PRN
Qty: 45 TABLET | Refills: 0 | Status: SHIPPED | OUTPATIENT
Start: 2019-09-16 | End: 2019-10-14 | Stop reason: SDUPTHER

## 2019-10-08 ENCOUNTER — OFFICE VISIT (OUTPATIENT)
Dept: FAMILY MEDICINE CLINIC | Facility: CLINIC | Age: 39
End: 2019-10-08
Payer: COMMERCIAL

## 2019-10-08 VITALS
HEART RATE: 102 BPM | OXYGEN SATURATION: 98 % | WEIGHT: 189.4 LBS | SYSTOLIC BLOOD PRESSURE: 118 MMHG | DIASTOLIC BLOOD PRESSURE: 74 MMHG | HEIGHT: 67 IN | BODY MASS INDEX: 29.73 KG/M2 | TEMPERATURE: 98.8 F

## 2019-10-08 DIAGNOSIS — E78.2 MIXED HYPERLIPIDEMIA: ICD-10-CM

## 2019-10-08 DIAGNOSIS — F41.1 GAD (GENERALIZED ANXIETY DISORDER): Primary | ICD-10-CM

## 2019-10-08 DIAGNOSIS — T14.8XXA MUSCLE STRAIN: ICD-10-CM

## 2019-10-08 PROCEDURE — 3008F BODY MASS INDEX DOCD: CPT | Performed by: FAMILY MEDICINE

## 2019-10-08 PROCEDURE — 99214 OFFICE O/P EST MOD 30 MIN: CPT | Performed by: FAMILY MEDICINE

## 2019-10-08 NOTE — PROGRESS NOTES
Assessment/Plan:     Chronic Problems:  No problem-specific Assessment & Plan notes found for this encounter  Visit Diagnosis:  Diagnoses and all orders for this visit:    GUCCI (generalized anxiety disorder)    Mixed hyperlipidemia    Muscle strain      General anxiety disorder  Stable, discussed plan of care with patient and spouse will continue current medications discussed multiple new stressors in children change in employment, stable  Hyperlipidemia  Has not yet gone for updated lipid profile will adjust accordingly when numbers available  Muscle strain  Discussed potential causative factors has been resolving, continue current plan perer call for any recurrence changes worsening    Subjective:    Patient ID: Orvel Litten is a 42 y o  male  Here to f/u   Family in the room , infants x 2   F/u from  the ER ,  lef tsided chest pain , reproducible with over head reach and I nthe act of carry infant child all day at times   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  Anxiety generally well controlled still dealing with issues in regard to change in appointment 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, making adjustments  Wife supportive  Continues with Lexapro 20 mg along with Klonopin p r n  Has over course of the past years diminished use of Klonopin positive results less reliant, still with moments of anxiety and stress more related to  general  Cholesterol, did not go for cholesterol/lipid check, thought would be completed but ER but apparently has not has made some changes in diet unknown effective will go        The following portions of the patient's history were reviewed and updated as appropriate: allergies, current medications, past family history, past medical history, past social history, past surgical history and problem list     Review of Systems   Constitutional: Negative for appetite change, chills, fever and unexpected weight change     HENT: Negative for congestion, dental problem, ear pain, hearing loss, postnasal drip, rhinorrhea, sinus pressure, sinus pain, sneezing, sore throat, tinnitus and voice change  Eyes: Negative for visual disturbance  Respiratory: Negative for apnea, cough, chest tightness and shortness of breath  Cardiovascular: Negative for chest pain, palpitations and leg swelling  Gastrointestinal: Negative for abdominal pain, blood in stool, constipation, diarrhea, nausea and vomiting  Endocrine: Negative for cold intolerance, heat intolerance, polydipsia, polyphagia and polyuria  Genitourinary: Negative for decreased urine volume, difficulty urinating, dysuria, frequency and hematuria  Musculoskeletal: Negative for arthralgias, back pain, gait problem, joint swelling and myalgias  Skin: Negative for color change, rash and wound  Allergic/Immunologic: Negative for environmental allergies and food allergies  Neurological: Negative for dizziness, syncope, weakness, light-headedness, numbness and headaches  Hematological: Negative for adenopathy  Does not bruise/bleed easily  Psychiatric/Behavioral: Negative for sleep disturbance and suicidal ideas  The patient is not nervous/anxious            /74   Pulse 102   Temp 98 8 °F (37 1 °C) (Tympanic)   Ht 5' 7" (1 702 m)   Wt 85 9 kg (189 lb 6 4 oz)   SpO2 98%   BMI 29 66 kg/m²   Social History     Socioeconomic History    Marital status: /Civil Union     Spouse name: Not on file    Number of children: Not on file    Years of education: Not on file    Highest education level: Not on file   Occupational History    Not on file   Social Needs    Financial resource strain: Not on file    Food insecurity:     Worry: Not on file     Inability: Not on file    Transportation needs:     Medical: Not on file     Non-medical: Not on file   Tobacco Use    Smoking status: Former Smoker     Packs/day: 1 00     Years: 4 00     Pack years: 4 00     Types: Cigarettes Last attempt to quit: 2014     Years since quittin 7    Smokeless tobacco: Current User     Types: Chew   Substance and Sexual Activity    Alcohol use: Yes     Comment: rarely    Drug use: Yes     Types: Marijuana     Comment: occasional, no illicit drug use as per allscripts    Sexual activity: Not on file   Lifestyle    Physical activity:     Days per week: Not on file     Minutes per session: Not on file    Stress: Not on file   Relationships    Social connections:     Talks on phone: Not on file     Gets together: Not on file     Attends Rastafari service: Not on file     Active member of club or organization: Not on file     Attends meetings of clubs or organizations: Not on file     Relationship status: Not on file    Intimate partner violence:     Fear of current or ex partner: Not on file     Emotionally abused: Not on file     Physically abused: Not on file     Forced sexual activity: Not on file   Other Topics Concern    Not on file   Social History Narrative    Always uses seat belt    Daily caffeine consumption 2-3 servings a day    Employed    Lives with children    Lives with spouse             Past Medical History:   Diagnosis Date    Anxiety     Hemorrhoids      Family History   Problem Relation Age of Onset    Hypertension Mother     Hyperlipidemia Mother     Heart disease Other         cardiac disease    Hypertension Other     Hyperlipidemia Other     Heart disease Other         cardiac disease    Hypertension Other     Hyperlipidemia Other      Past Surgical History:   Procedure Laterality Date    HERNIA REPAIR         Current Outpatient Medications:     clonazePAM (KlonoPIN) 0 5 mg tablet, Take 1 tablet (0 5 mg total) by mouth 2 (two) times a day as needed for anxiety or seizures, Disp: 45 tablet, Rfl: 0    escitalopram (LEXAPRO) 20 mg tablet, Take 1 tablet (20 mg total) by mouth daily, Disp: 90 tablet, Rfl: 1    Allergies   Allergen Reactions    Penicillins Hives          Lab Review   No visits with results within 6 Month(s) from this visit  Latest known visit with results is:   Appointment on 10/28/2017   Component Date Value    Sodium 10/28/2017 138     Potassium 10/28/2017 4 1     Chloride 10/28/2017 106     CO2 10/28/2017 26     ANION GAP 10/28/2017 6     BUN 10/28/2017 13     Creatinine 10/28/2017 0 89     Glucose, Fasting 10/28/2017 90     Calcium 10/28/2017 8 9     AST 10/28/2017 22     ALT 10/28/2017 38     Alkaline Phosphatase 10/28/2017 67     Total Protein 10/28/2017 7 9     Albumin 10/28/2017 4 1     Total Bilirubin 10/28/2017 0 25     eGFR 10/28/2017 111     Cholesterol 10/28/2017 155     Triglycerides 10/28/2017 474*    HDL, Direct 10/28/2017 24*    LDL Calculated 10/28/2017      WBC 10/28/2017 9 18     RBC 10/28/2017 5 09     Hemoglobin 10/28/2017 15 0     Hematocrit 10/28/2017 43 7     MCV 10/28/2017 86     MCH 10/28/2017 29 5     MCHC 10/28/2017 34 3     RDW 10/28/2017 12 0     MPV 10/28/2017 10 8     Platelets 57/69/4616 285     nRBC 10/28/2017 0     Neutrophils Relative 10/28/2017 47     Lymphocytes Relative 10/28/2017 35     Monocytes Relative 10/28/2017 5     Eosinophils Relative 10/28/2017 13*    Basophils Relative 10/28/2017 0     Neutrophils Absolute 10/28/2017 4 27     Lymphocytes Absolute 10/28/2017 3 23     Monocytes Absolute 10/28/2017 0 49     Eosinophils Absolute 10/28/2017 1  15*    Basophils Absolute 10/28/2017 0 02     Vit D, 1,25-Dihydroxy 10/28/2017 34 6     Color, UA 10/28/2017 Yellow     Clarity, UA 10/28/2017 Turbid     Specific Gravity, UA 10/28/2017 1 025     pH, UA 10/28/2017 6 0     Leukocytes, UA 10/28/2017 Negative     Nitrite, UA 10/28/2017 Negative     Protein, UA 10/28/2017 Negative     Glucose, UA 10/28/2017 Negative     Ketones, UA 10/28/2017 Negative     Urobilinogen, UA 10/28/2017 0 2     Bilirubin, UA 10/28/2017 Negative     Blood, UA 10/28/2017 Trace*    TSH 3RD GENERATON 10/28/2017 1 670     Troponin I 10/28/2017 <0 02     Total CK 10/28/2017 103     RBC, UA 10/28/2017 2-4*    WBC, UA 10/28/2017 None Seen     Epithelial Cells 10/28/2017 None Seen     Bacteria, UA 10/28/2017 None Seen     Hyaline Casts, UA 10/28/2017 None Seen         Imaging: No results found  Objective:     Physical Exam   Constitutional: He is oriented to person, place, and time  He appears well-developed and well-nourished  HENT:   Head: Normocephalic and atraumatic  Eyes: EOM are normal    Neck: Normal range of motion  Neck supple  Cardiovascular: Normal rate, regular rhythm and normal heart sounds  Pulmonary/Chest: Effort normal and breath sounds normal    Abdominal: Soft  Bowel sounds are normal    Musculoskeletal: Normal range of motion  Neurological: He is alert and oriented to person, place, and time  He has normal reflexes  Skin: Skin is warm and dry  Psychiatric: He has a normal mood and affect  His behavior is normal  Judgment and thought content normal          There are no Patient Instructions on file for this visit  FEROZ Ochoa    Portions of the record may have been created with voice recognition software  Occasional wrong word or "sound a like" substitutions may have occurred due to the inherent limitations of voice recognition software  Read the chart carefully and recognize, using context, where substitutions have occurred

## 2019-10-14 DIAGNOSIS — F41.9 ANXIETY: ICD-10-CM

## 2019-10-14 RX ORDER — CLONAZEPAM 0.5 MG/1
0.5 TABLET ORAL 2 TIMES DAILY PRN
Qty: 45 TABLET | Refills: 0 | Status: SHIPPED | OUTPATIENT
Start: 2019-10-14 | End: 2019-11-15 | Stop reason: SDUPTHER

## 2019-11-15 DIAGNOSIS — F41.9 ANXIETY: ICD-10-CM

## 2019-11-17 RX ORDER — CLONAZEPAM 0.5 MG/1
0.5 TABLET ORAL 2 TIMES DAILY PRN
Qty: 45 TABLET | Refills: 0 | Status: SHIPPED | OUTPATIENT
Start: 2019-11-17 | End: 2019-12-18 | Stop reason: SDUPTHER

## 2019-12-18 DIAGNOSIS — F41.9 ANXIETY: ICD-10-CM

## 2019-12-18 RX ORDER — CLONAZEPAM 0.5 MG/1
0.5 TABLET ORAL 2 TIMES DAILY PRN
Qty: 45 TABLET | Refills: 0 | Status: SHIPPED | OUTPATIENT
Start: 2019-12-18 | End: 2020-01-21 | Stop reason: SDUPTHER

## 2020-01-21 DIAGNOSIS — F41.9 ANXIETY: ICD-10-CM

## 2020-01-22 RX ORDER — CLONAZEPAM 0.5 MG/1
0.5 TABLET ORAL 2 TIMES DAILY PRN
Qty: 45 TABLET | Refills: 0 | Status: SHIPPED | OUTPATIENT
Start: 2020-01-22 | End: 2020-02-28 | Stop reason: SDUPTHER

## 2020-02-10 DIAGNOSIS — F41.1 GAD (GENERALIZED ANXIETY DISORDER): ICD-10-CM

## 2020-02-10 RX ORDER — ESCITALOPRAM OXALATE 20 MG/1
TABLET ORAL
Qty: 90 TABLET | Refills: 1 | Status: SHIPPED | OUTPATIENT
Start: 2020-02-10 | End: 2020-07-14 | Stop reason: SDUPTHER

## 2020-02-28 DIAGNOSIS — F41.9 ANXIETY: ICD-10-CM

## 2020-03-01 RX ORDER — CLONAZEPAM 0.5 MG/1
0.5 TABLET ORAL 2 TIMES DAILY PRN
Qty: 45 TABLET | Refills: 0 | Status: SHIPPED | OUTPATIENT
Start: 2020-03-01 | End: 2020-04-07 | Stop reason: SDUPTHER

## 2020-04-02 DIAGNOSIS — F41.9 ANXIETY: ICD-10-CM

## 2020-04-02 RX ORDER — CLONAZEPAM 0.5 MG/1
0.5 TABLET ORAL 2 TIMES DAILY PRN
Qty: 45 TABLET | Refills: 0 | Status: CANCELLED | OUTPATIENT
Start: 2020-04-02

## 2020-04-07 ENCOUNTER — TELEMEDICINE (OUTPATIENT)
Dept: FAMILY MEDICINE CLINIC | Facility: CLINIC | Age: 40
End: 2020-04-07
Payer: COMMERCIAL

## 2020-04-07 DIAGNOSIS — F41.9 ANXIETY: ICD-10-CM

## 2020-04-07 PROCEDURE — 99213 OFFICE O/P EST LOW 20 MIN: CPT | Performed by: FAMILY MEDICINE

## 2020-04-07 RX ORDER — CLONAZEPAM 0.5 MG/1
0.5 TABLET ORAL 2 TIMES DAILY PRN
Qty: 45 TABLET | Refills: 0 | Status: SHIPPED | OUTPATIENT
Start: 2020-04-07 | End: 2020-05-07 | Stop reason: SDUPTHER

## 2020-05-07 DIAGNOSIS — F41.9 ANXIETY: ICD-10-CM

## 2020-05-07 RX ORDER — CLONAZEPAM 0.5 MG/1
0.5 TABLET ORAL 2 TIMES DAILY PRN
Qty: 45 TABLET | Refills: 0 | Status: SHIPPED | OUTPATIENT
Start: 2020-05-07 | End: 2020-06-09 | Stop reason: SDUPTHER

## 2020-06-09 DIAGNOSIS — F41.9 ANXIETY: ICD-10-CM

## 2020-06-11 RX ORDER — CLONAZEPAM 0.5 MG/1
0.5 TABLET ORAL 2 TIMES DAILY PRN
Qty: 45 TABLET | Refills: 0 | Status: SHIPPED | OUTPATIENT
Start: 2020-06-11 | End: 2020-07-16 | Stop reason: SDUPTHER

## 2020-07-14 DIAGNOSIS — F41.1 GAD (GENERALIZED ANXIETY DISORDER): ICD-10-CM

## 2020-07-14 RX ORDER — ESCITALOPRAM OXALATE 20 MG/1
20 TABLET ORAL DAILY
Qty: 90 TABLET | Refills: 1 | Status: SHIPPED | OUTPATIENT
Start: 2020-07-14 | End: 2021-01-15

## 2020-07-16 DIAGNOSIS — F41.9 ANXIETY: ICD-10-CM

## 2020-07-16 RX ORDER — CLONAZEPAM 0.5 MG/1
0.5 TABLET ORAL 2 TIMES DAILY PRN
Qty: 45 TABLET | Refills: 0 | Status: SHIPPED | OUTPATIENT
Start: 2020-07-16 | End: 2020-08-20 | Stop reason: SDUPTHER

## 2020-07-16 NOTE — TELEPHONE ENCOUNTER
He thought this Rx was going to get filled with his Lexipro  He only has a few pills left, can you refill

## 2020-08-20 DIAGNOSIS — F41.9 ANXIETY: ICD-10-CM

## 2020-08-25 RX ORDER — CLONAZEPAM 0.5 MG/1
0.5 TABLET ORAL 2 TIMES DAILY PRN
Qty: 45 TABLET | Refills: 0 | Status: SHIPPED | OUTPATIENT
Start: 2020-08-25 | End: 2020-10-19 | Stop reason: SDUPTHER

## 2020-08-26 ENCOUNTER — OFFICE VISIT (OUTPATIENT)
Dept: FAMILY MEDICINE CLINIC | Facility: CLINIC | Age: 40
End: 2020-08-26
Payer: COMMERCIAL

## 2020-08-26 VITALS
HEIGHT: 67 IN | DIASTOLIC BLOOD PRESSURE: 78 MMHG | TEMPERATURE: 98.4 F | SYSTOLIC BLOOD PRESSURE: 128 MMHG | OXYGEN SATURATION: 98 % | RESPIRATION RATE: 18 BRPM | HEART RATE: 65 BPM | WEIGHT: 200 LBS | BODY MASS INDEX: 31.39 KG/M2

## 2020-08-26 DIAGNOSIS — E78.2 MIXED HYPERLIPIDEMIA: ICD-10-CM

## 2020-08-26 DIAGNOSIS — Z11.4 SCREENING FOR HIV WITHOUT PRESENCE OF RISK FACTORS: ICD-10-CM

## 2020-08-26 DIAGNOSIS — F41.1 GAD (GENERALIZED ANXIETY DISORDER): Primary | ICD-10-CM

## 2020-08-26 PROCEDURE — 3008F BODY MASS INDEX DOCD: CPT | Performed by: FAMILY MEDICINE

## 2020-08-26 PROCEDURE — 99213 OFFICE O/P EST LOW 20 MIN: CPT | Performed by: FAMILY MEDICINE

## 2020-08-26 NOTE — PROGRESS NOTES
BMI Counseling: Body mass index is 31 32 kg/m²  The BMI is above normal  Nutrition recommendations include decreasing portion sizes, decreasing fast food intake, limiting drinks that contain sugar, moderation in carbohydrate intake and reducing intake of saturated and trans fat  Exercise recommendations include moderate physical activity 150 minutes/week and exercising 3-5 times per week  No pharmacotherapy was ordered  Assessment/Plan:     Chronic Problems:  No problem-specific Assessment & Plan notes found for this encounter  Visit Diagnosis:  Diagnoses and all orders for this visit:    GUCCI (generalized anxiety disorder)  -     Comprehensive metabolic panel; Future  -     CBC; Future  -     Lipid panel; Future  -     UA w Reflex to Microscopic w Reflex to Culture -Lab Collect    Mixed hyperlipidemia  -     Comprehensive metabolic panel; Future  -     CBC; Future  -     Lipid panel; Future  -     UA w Reflex to Microscopic w Reflex to Culture -Lab Collect    Screening for HIV without presence of risk factors  -     HIV 1/2 Antigen/Antibody (4th Generation) w Reflex [facility]; Future      Gucci   stable, discussed issues concerns at length overall improvement since 1st seen years ago, has discussed medications, reviewed decreasing knee for benzodiazepine continue Lexapro   continue current activities   hyperlipidemia   stable, encourage continued weight loss diet management will obtain updated lipid profile      Subjective:    Patient ID: Drea Rios is a 42 y o  male      F/u   Doing well   Staying active ,   Panic attack , has been very hesitant , but when I get down to three or four , gets me nervous   Ativan 0 5 mg prn , but averaging   Over the yrs things have improved so much   Still on the lexapro   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 , 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 , now   Dealing with the covid , and amazing ly handling it   Exercise really helps   Has yet to go for updated labs     fam hx   Father 48 ,  , prior etoh   Mother 52's depression , anxiety , chol   Brother bipolar   Brother heroin , etoh   Sister   Sister         The following portions of the patient's history were reviewed and updated as appropriate: allergies, current medications, past family history, past medical history, past social history, past surgical history and problem list     Review of Systems   Constitutional: Negative for appetite change, chills, fever and unexpected weight change  HENT: Negative for congestion, dental problem, ear pain, hearing loss, postnasal drip, rhinorrhea, sinus pressure, sinus pain, sneezing, sore throat, tinnitus and voice change  Eyes: Negative for visual disturbance  Respiratory: Negative for apnea, cough, chest tightness and shortness of breath  Cardiovascular: Negative for chest pain, palpitations and leg swelling  Gastrointestinal: Negative for abdominal pain, blood in stool, constipation, diarrhea, nausea and vomiting  Endocrine: Negative for cold intolerance, heat intolerance, polydipsia, polyphagia and polyuria  Genitourinary: Negative for decreased urine volume, difficulty urinating, dysuria, frequency and hematuria  Musculoskeletal: Negative for arthralgias, back pain, gait problem, joint swelling and myalgias  Skin: Negative for color change, rash and wound  Allergic/Immunologic: Negative for environmental allergies and food allergies  Neurological: Negative for dizziness, syncope, weakness, light-headedness, numbness and headaches  Hematological: Negative for adenopathy  Does not bruise/bleed easily  Psychiatric/Behavioral: Negative for sleep disturbance and suicidal ideas  The patient is not nervous/anxious            /78   Pulse 65   Temp 98 4 °F (36 9 °C) (Tympanic)   Resp 18   Ht 5' 7" (1 702 m)   Wt 90 7 kg (200 lb)   SpO2 98%   BMI 31 32 kg/m²   Social History     Socioeconomic History    Marital status: /Civil Union     Spouse name: Not on file    Number of children: Not on file    Years of education: Not on file    Highest education level: Not on file   Occupational History    Not on file   Social Needs    Financial resource strain: Not on file    Food insecurity     Worry: Not on file     Inability: Not on file    Transportation needs     Medical: Not on file     Non-medical: Not on file   Tobacco Use    Smoking status: Former Smoker     Packs/day: 1 00     Years: 4 00     Pack years: 4 00     Types: Cigarettes     Last attempt to quit:      Years since quittin 6    Smokeless tobacco: Current User     Types: Chew   Substance and Sexual Activity    Alcohol use: Yes     Comment: rarely    Drug use: Yes     Types: Marijuana     Comment: occasional, no illicit drug use as per allscripts    Sexual activity: Not on file   Lifestyle    Physical activity     Days per week: Not on file     Minutes per session: Not on file    Stress: Not on file   Relationships    Social connections     Talks on phone: Not on file     Gets together: Not on file     Attends Restorationist service: Not on file     Active member of club or organization: Not on file     Attends meetings of clubs or organizations: Not on file     Relationship status: Not on file    Intimate partner violence     Fear of current or ex partner: Not on file     Emotionally abused: Not on file     Physically abused: Not on file     Forced sexual activity: Not on file   Other Topics Concern    Not on file   Social History Narrative    Always uses seat belt    Daily caffeine consumption 2-3 servings a day    Employed    Lives with children    Lives with spouse             Past Medical History:   Diagnosis Date    Anxiety     Hemorrhoids      Family History   Problem Relation Age of Onset    Hypertension Mother     Hyperlipidemia Mother     Heart disease Other         cardiac disease    Hypertension Other     Hyperlipidemia Other     Heart disease Other         cardiac disease    Hypertension Other     Hyperlipidemia Other      Past Surgical History:   Procedure Laterality Date    HERNIA REPAIR         Current Outpatient Medications:     clonazePAM (KlonoPIN) 0 5 mg tablet, Take 1 tablet (0 5 mg total) by mouth 2 (two) times a day as needed for anxiety or seizures, Disp: 45 tablet, Rfl: 0    escitalopram (LEXAPRO) 20 mg tablet, Take 1 tablet (20 mg total) by mouth daily, Disp: 90 tablet, Rfl: 1    Allergies   Allergen Reactions    Penicillins Hives          Lab Review   No visits with results within 6 Month(s) from this visit  Latest known visit with results is:   Appointment on 10/28/2017   Component Date Value    Sodium 10/28/2017 138     Potassium 10/28/2017 4 1     Chloride 10/28/2017 106     CO2 10/28/2017 26     ANION GAP 10/28/2017 6     BUN 10/28/2017 13     Creatinine 10/28/2017 0 89     Glucose, Fasting 10/28/2017 90     Calcium 10/28/2017 8 9     AST 10/28/2017 22     ALT 10/28/2017 38     Alkaline Phosphatase 10/28/2017 67     Total Protein 10/28/2017 7 9     Albumin 10/28/2017 4 1     Total Bilirubin 10/28/2017 0 25     eGFR 10/28/2017 111     Cholesterol 10/28/2017 155     Triglycerides 10/28/2017 474*    HDL, Direct 10/28/2017 24*    LDL Calculated 10/28/2017      WBC 10/28/2017 9 18     RBC 10/28/2017 5 09     Hemoglobin 10/28/2017 15 0     Hematocrit 10/28/2017 43 7     MCV 10/28/2017 86     MCH 10/28/2017 29 5     MCHC 10/28/2017 34 3     RDW 10/28/2017 12 0     MPV 10/28/2017 10 8     Platelets 83/40/1379 285     nRBC 10/28/2017 0     Neutrophils Relative 10/28/2017 47     Lymphocytes Relative 10/28/2017 35     Monocytes Relative 10/28/2017 5     Eosinophils Relative 10/28/2017 13*    Basophils Relative 10/28/2017 0     Neutrophils Absolute 10/28/2017 4 27     Lymphocytes Absolute 10/28/2017 3 23     Monocytes Absolute 10/28/2017 0 49     Eosinophils Absolute 10/28/2017 1  15*    Basophils Absolute 10/28/2017 0 02     Vit D, 1,25-Dihydroxy 10/28/2017 34 6     Color, UA 10/28/2017 Yellow     Clarity, UA 10/28/2017 Turbid     Specific Gravity, UA 10/28/2017 1 025     pH, UA 10/28/2017 6 0     Leukocytes, UA 10/28/2017 Negative     Nitrite, UA 10/28/2017 Negative     Protein, UA 10/28/2017 Negative     Glucose, UA 10/28/2017 Negative     Ketones, UA 10/28/2017 Negative     Urobilinogen, UA 10/28/2017 0 2     Bilirubin, UA 10/28/2017 Negative     Blood, UA 10/28/2017 Trace*    TSH 3RD GENERATON 10/28/2017 1 670     Troponin I 10/28/2017 <0 02     Total CK 10/28/2017 103     RBC, UA 10/28/2017 2-4*    WBC, UA 10/28/2017 None Seen     Epithelial Cells 10/28/2017 None Seen     Bacteria, UA 10/28/2017 None Seen     Hyaline Casts, UA 10/28/2017 None Seen         Imaging: No results found  Objective:     Physical Exam  Constitutional:       Appearance: He is well-developed  He is not ill-appearing  HENT:      Head: Normocephalic and atraumatic  Neck:      Musculoskeletal: Normal range of motion and neck supple  Cardiovascular:      Rate and Rhythm: Normal rate and regular rhythm  Heart sounds: Normal heart sounds  Pulmonary:      Effort: Pulmonary effort is normal       Breath sounds: Normal breath sounds  Musculoskeletal: Normal range of motion  Skin:     General: Skin is warm and dry  Neurological:      Mental Status: He is alert and oriented to person, place, and time  Deep Tendon Reflexes: Reflexes are normal and symmetric  Psychiatric:         Behavior: Behavior normal          Thought Content: Thought content normal          Judgment: Judgment normal            There are no Patient Instructions on file for this visit  FEROZ Caba    Portions of the record may have been created with voice recognition software  Occasional wrong word or "sound a like" substitutions may have occurred due to the inherent limitations of voice recognition software    Read the chart carefully and recognize, using context, where substitutions have occurred

## 2020-10-19 DIAGNOSIS — F41.9 ANXIETY: ICD-10-CM

## 2020-10-20 ENCOUNTER — LAB (OUTPATIENT)
Dept: LAB | Facility: CLINIC | Age: 40
End: 2020-10-20
Payer: COMMERCIAL

## 2020-10-20 DIAGNOSIS — F41.1 GAD (GENERALIZED ANXIETY DISORDER): ICD-10-CM

## 2020-10-20 DIAGNOSIS — E78.2 MIXED HYPERLIPIDEMIA: ICD-10-CM

## 2020-10-20 DIAGNOSIS — Z11.4 SCREENING FOR HIV WITHOUT PRESENCE OF RISK FACTORS: ICD-10-CM

## 2020-10-20 LAB
ALBUMIN SERPL BCP-MCNC: 4.3 G/DL (ref 3.5–5)
ALP SERPL-CCNC: 75 U/L (ref 46–116)
ALT SERPL W P-5'-P-CCNC: 37 U/L (ref 12–78)
ANION GAP SERPL CALCULATED.3IONS-SCNC: 4 MMOL/L (ref 4–13)
AST SERPL W P-5'-P-CCNC: 16 U/L (ref 5–45)
BILIRUB SERPL-MCNC: 0.42 MG/DL (ref 0.2–1)
BILIRUB UR QL STRIP: NEGATIVE
BUN SERPL-MCNC: 15 MG/DL (ref 5–25)
CALCIUM SERPL-MCNC: 9.9 MG/DL (ref 8.3–10.1)
CHLORIDE SERPL-SCNC: 105 MMOL/L (ref 100–108)
CHOLEST SERPL-MCNC: 202 MG/DL (ref 50–200)
CLARITY UR: CLEAR
CO2 SERPL-SCNC: 30 MMOL/L (ref 21–32)
COLOR UR: YELLOW
CREAT SERPL-MCNC: 0.93 MG/DL (ref 0.6–1.3)
ERYTHROCYTE [DISTWIDTH] IN BLOOD BY AUTOMATED COUNT: 11.9 % (ref 11.6–15.1)
GFR SERPL CREATININE-BSD FRML MDRD: 104 ML/MIN/1.73SQ M
GLUCOSE P FAST SERPL-MCNC: 102 MG/DL (ref 65–99)
GLUCOSE UR STRIP-MCNC: NEGATIVE MG/DL
HCT VFR BLD AUTO: 45.5 % (ref 36.5–49.3)
HDLC SERPL-MCNC: 40 MG/DL
HGB BLD-MCNC: 15.3 G/DL (ref 12–17)
HGB UR QL STRIP.AUTO: NEGATIVE
KETONES UR STRIP-MCNC: NEGATIVE MG/DL
LEUKOCYTE ESTERASE UR QL STRIP: NEGATIVE
MCH RBC QN AUTO: 30 PG (ref 26.8–34.3)
MCHC RBC AUTO-ENTMCNC: 33.6 G/DL (ref 31.4–37.4)
MCV RBC AUTO: 89 FL (ref 82–98)
NITRITE UR QL STRIP: NEGATIVE
NONHDLC SERPL-MCNC: 162 MG/DL
PH UR STRIP.AUTO: 7.5 [PH]
PLATELET # BLD AUTO: 260 THOUSANDS/UL (ref 149–390)
PMV BLD AUTO: 11 FL (ref 8.9–12.7)
POTASSIUM SERPL-SCNC: 3.9 MMOL/L (ref 3.5–5.3)
PROT SERPL-MCNC: 8 G/DL (ref 6.4–8.2)
PROT UR STRIP-MCNC: NEGATIVE MG/DL
RBC # BLD AUTO: 5.1 MILLION/UL (ref 3.88–5.62)
SODIUM SERPL-SCNC: 139 MMOL/L (ref 136–145)
SP GR UR STRIP.AUTO: 1.02 (ref 1–1.03)
TRIGL SERPL-MCNC: 525 MG/DL
UROBILINOGEN UR QL STRIP.AUTO: 0.2 E.U./DL
WBC # BLD AUTO: 10.64 THOUSAND/UL (ref 4.31–10.16)

## 2020-10-20 PROCEDURE — 80061 LIPID PANEL: CPT

## 2020-10-20 PROCEDURE — 80053 COMPREHEN METABOLIC PANEL: CPT

## 2020-10-20 PROCEDURE — 36415 COLL VENOUS BLD VENIPUNCTURE: CPT

## 2020-10-20 PROCEDURE — 85027 COMPLETE CBC AUTOMATED: CPT

## 2020-10-20 PROCEDURE — 87389 HIV-1 AG W/HIV-1&-2 AB AG IA: CPT

## 2020-10-20 PROCEDURE — 81003 URINALYSIS AUTO W/O SCOPE: CPT | Performed by: FAMILY MEDICINE

## 2020-10-20 RX ORDER — CLONAZEPAM 0.5 MG/1
0.5 TABLET ORAL 2 TIMES DAILY PRN
Qty: 45 TABLET | Refills: 0 | Status: SHIPPED | OUTPATIENT
Start: 2020-10-20 | End: 2020-12-07 | Stop reason: SDUPTHER

## 2020-10-21 LAB — HIV 1+2 AB+HIV1 P24 AG SERPL QL IA: NORMAL

## 2020-11-04 ENCOUNTER — OFFICE VISIT (OUTPATIENT)
Dept: FAMILY MEDICINE CLINIC | Facility: CLINIC | Age: 40
End: 2020-11-04
Payer: COMMERCIAL

## 2020-11-04 VITALS
HEART RATE: 63 BPM | RESPIRATION RATE: 12 BRPM | HEIGHT: 67 IN | WEIGHT: 201.8 LBS | OXYGEN SATURATION: 97 % | SYSTOLIC BLOOD PRESSURE: 120 MMHG | BODY MASS INDEX: 31.67 KG/M2 | TEMPERATURE: 97.6 F | DIASTOLIC BLOOD PRESSURE: 75 MMHG

## 2020-11-04 DIAGNOSIS — F41.9 ANXIETY: ICD-10-CM

## 2020-11-04 DIAGNOSIS — E78.2 MIXED HYPERLIPIDEMIA: Primary | ICD-10-CM

## 2020-11-04 PROCEDURE — 99214 OFFICE O/P EST MOD 30 MIN: CPT | Performed by: FAMILY MEDICINE

## 2020-11-04 RX ORDER — FENOFIBRATE 145 MG/1
145 TABLET, COATED ORAL DAILY
Qty: 30 TABLET | Refills: 3 | Status: SHIPPED | OUTPATIENT
Start: 2020-11-04 | End: 2021-02-26

## 2020-12-07 DIAGNOSIS — F41.9 ANXIETY: ICD-10-CM

## 2020-12-08 RX ORDER — CLONAZEPAM 0.5 MG/1
0.5 TABLET ORAL 2 TIMES DAILY PRN
Qty: 45 TABLET | Refills: 0 | Status: SHIPPED | OUTPATIENT
Start: 2020-12-08 | End: 2021-01-22 | Stop reason: SDUPTHER

## 2020-12-18 ENCOUNTER — TELEMEDICINE (OUTPATIENT)
Dept: FAMILY MEDICINE CLINIC | Facility: CLINIC | Age: 40
End: 2020-12-18
Payer: COMMERCIAL

## 2020-12-18 DIAGNOSIS — B34.9 VIRAL INFECTION, UNSPECIFIED: ICD-10-CM

## 2020-12-18 DIAGNOSIS — Z03.818 ENCOUNTER FOR OBSERVATION FOR SUSPECTED EXPOSURE TO OTHER BIOLOGICAL AGENTS RULED OUT: ICD-10-CM

## 2020-12-18 PROCEDURE — 99214 OFFICE O/P EST MOD 30 MIN: CPT | Performed by: NURSE PRACTITIONER

## 2020-12-18 RX ORDER — ALBUTEROL SULFATE 90 UG/1
2 AEROSOL, METERED RESPIRATORY (INHALATION) EVERY 6 HOURS PRN
Qty: 1 INHALER | Refills: 0 | Status: SHIPPED | OUTPATIENT
Start: 2020-12-18

## 2021-01-15 DIAGNOSIS — F41.1 GAD (GENERALIZED ANXIETY DISORDER): ICD-10-CM

## 2021-01-15 RX ORDER — ESCITALOPRAM OXALATE 20 MG/1
TABLET ORAL
Qty: 90 TABLET | Refills: 1 | Status: SHIPPED | OUTPATIENT
Start: 2021-01-15 | End: 2021-07-12

## 2021-01-21 DIAGNOSIS — F41.9 ANXIETY: ICD-10-CM

## 2021-01-21 RX ORDER — CLONAZEPAM 0.5 MG/1
0.5 TABLET ORAL 2 TIMES DAILY PRN
Qty: 45 TABLET | Refills: 0 | Status: CANCELLED | OUTPATIENT
Start: 2021-01-21

## 2021-01-22 DIAGNOSIS — F41.9 ANXIETY: ICD-10-CM

## 2021-01-25 RX ORDER — CLONAZEPAM 0.5 MG/1
0.5 TABLET ORAL 2 TIMES DAILY PRN
Qty: 45 TABLET | Refills: 0 | Status: SHIPPED | OUTPATIENT
Start: 2021-01-25 | End: 2021-03-09 | Stop reason: SDUPTHER

## 2021-02-26 DIAGNOSIS — E78.2 MIXED HYPERLIPIDEMIA: ICD-10-CM

## 2021-02-26 RX ORDER — FENOFIBRATE 145 MG/1
TABLET, COATED ORAL
Qty: 90 TABLET | Refills: 1 | Status: SHIPPED | OUTPATIENT
Start: 2021-02-26 | End: 2021-11-09 | Stop reason: SDUPTHER

## 2021-03-09 ENCOUNTER — OFFICE VISIT (OUTPATIENT)
Dept: FAMILY MEDICINE CLINIC | Facility: CLINIC | Age: 41
End: 2021-03-09
Payer: COMMERCIAL

## 2021-03-09 VITALS
DIASTOLIC BLOOD PRESSURE: 72 MMHG | TEMPERATURE: 98 F | WEIGHT: 201 LBS | OXYGEN SATURATION: 96 % | HEIGHT: 67 IN | SYSTOLIC BLOOD PRESSURE: 114 MMHG | RESPIRATION RATE: 16 BRPM | HEART RATE: 77 BPM | BODY MASS INDEX: 31.55 KG/M2

## 2021-03-09 DIAGNOSIS — E78.2 MIXED HYPERLIPIDEMIA: ICD-10-CM

## 2021-03-09 DIAGNOSIS — N50.812 PAIN IN LEFT TESTICLE: Primary | ICD-10-CM

## 2021-03-09 DIAGNOSIS — F41.9 ANXIETY: ICD-10-CM

## 2021-03-09 PROCEDURE — 99214 OFFICE O/P EST MOD 30 MIN: CPT | Performed by: FAMILY MEDICINE

## 2021-03-09 RX ORDER — CLONAZEPAM 0.5 MG/1
0.5 TABLET ORAL 2 TIMES DAILY PRN
Qty: 45 TABLET | Refills: 0 | Status: SHIPPED | OUTPATIENT
Start: 2021-03-09 | End: 2021-04-19 | Stop reason: SDUPTHER

## 2021-03-09 NOTE — PROGRESS NOTES
Assessment/Plan:     Chronic Problems:  No problem-specific Assessment & Plan notes found for this encounter  Visit Diagnosis:  Diagnoses and all orders for this visit:    Pain in left testicle  -     US scrotum and testicles; Future    Anxiety  -     clonazePAM (KlonoPIN) 0 5 mg tablet; Take 1 tablet (0 5 mg total) by mouth 2 (two) times a day as needed for anxiety or seizures    Mixed hyperlipidemia       left testicle   will obtain ultrasound area   anxiety   stable, over the years has made steady progress will continue current medications discussed medications at length dosing timing side effects   hyperlipidemia   to continue current medications will obtain updated lipid profile chemistries    Subjective:    Patient ID: Dallin Goyal is a 47 y o  male      He for f/u   Expecting another child #3   iso immunization issue s   Left testicle has noted for yrs , unsure of any changes   Tender at times , neg swelling , neg urinary changes   Has yet to fo for the labs , in regard to chol , meds unchanged   [place]  Stable, in spite of stressors with new pregnancy, continues on Lexapro, has diminished in use of clonidine dramatically since our 1st meeting years ago   presently looking for employment   continues to provide care for children wife  Works days dental      The following portions of the patient's history were reviewed and updated as appropriate: allergies, current medications, past family history, past medical history, past social history, past surgical history and problem list     Review of Systems      /72 (BP Location: Left arm, Patient Position: Sitting, Cuff Size: Adult)   Pulse 77   Temp 98 °F (36 7 °C)   Resp 16   Ht 5' 7" (1 702 m)   Wt 91 2 kg (201 lb)   SpO2 96%   BMI 31 48 kg/m²   Social History     Socioeconomic History    Marital status: /Civil Union     Spouse name: Not on file    Number of children: Not on file    Years of education: Not on file    Highest education level: Not on file   Occupational History    Not on file   Social Needs    Financial resource strain: Not on file    Food insecurity     Worry: Not on file     Inability: Not on file    Transportation needs     Medical: Not on file     Non-medical: Not on file   Tobacco Use    Smoking status: Former Smoker     Packs/day: 1 00     Years: 4 00     Pack years: 4 00     Types: Cigarettes     Quit date:      Years since quittin 1    Smokeless tobacco: Current User     Types: Chew   Substance and Sexual Activity    Alcohol use: Yes     Comment: rarely    Drug use: Yes     Types: Marijuana     Comment: occasional, no illicit drug use as per allscripts    Sexual activity: Not on file   Lifestyle    Physical activity     Days per week: Not on file     Minutes per session: Not on file    Stress: Not on file   Relationships    Social connections     Talks on phone: Not on file     Gets together: Not on file     Attends Catholic service: Not on file     Active member of club or organization: Not on file     Attends meetings of clubs or organizations: Not on file     Relationship status: Not on file    Intimate partner violence     Fear of current or ex partner: Not on file     Emotionally abused: Not on file     Physically abused: Not on file     Forced sexual activity: Not on file   Other Topics Concern    Not on file   Social History Narrative    Always uses seat belt    Daily caffeine consumption 2-3 servings a day    Employed    Lives with children    Lives with spouse             Past Medical History:   Diagnosis Date    Anxiety     Hemorrhoids      Family History   Problem Relation Age of Onset    Hypertension Mother     Hyperlipidemia Mother     Heart disease Other         cardiac disease    Hypertension Other     Hyperlipidemia Other     Heart disease Other         cardiac disease    Hypertension Other     Hyperlipidemia Other      Past Surgical History:   Procedure Laterality Date    HERNIA REPAIR         Current Outpatient Medications:     albuterol (PROVENTIL HFA,VENTOLIN HFA) 90 mcg/act inhaler, Inhale 2 puffs every 6 (six) hours as needed for wheezing or shortness of breath, Disp: 1 Inhaler, Rfl: 0    clonazePAM (KlonoPIN) 0 5 mg tablet, Take 1 tablet (0 5 mg total) by mouth 2 (two) times a day as needed for anxiety or seizures, Disp: 45 tablet, Rfl: 0    escitalopram (LEXAPRO) 20 mg tablet, TAKE 1 TABLET BY MOUTH EVERY DAY, Disp: 90 tablet, Rfl: 1    fenofibrate (TRICOR) 145 mg tablet, TAKE 1 TABLET BY MOUTH EVERY DAY, Disp: 90 tablet, Rfl: 1    Allergies   Allergen Reactions    Penicillins Hives          Lab Review   Lab on 10/20/2020   Component Date Value    HIV-1/HIV-2 Ab 10/20/2020 Non-Reactive     Sodium 10/20/2020 139     Potassium 10/20/2020 3 9     Chloride 10/20/2020 105     CO2 10/20/2020 30     ANION GAP 10/20/2020 4     BUN 10/20/2020 15     Creatinine 10/20/2020 0 93     Glucose, Fasting 10/20/2020 102*    Calcium 10/20/2020 9 9     AST 10/20/2020 16     ALT 10/20/2020 37     Alkaline Phosphatase 10/20/2020 75     Total Protein 10/20/2020 8 0     Albumin 10/20/2020 4 3     Total Bilirubin 10/20/2020 0 42     eGFR 10/20/2020 104     WBC 10/20/2020 10 64*    RBC 10/20/2020 5 10     Hemoglobin 10/20/2020 15 3     Hematocrit 10/20/2020 45 5     MCV 10/20/2020 89     MCH 10/20/2020 30 0     MCHC 10/20/2020 33 6     RDW 10/20/2020 11 9     Platelets 87/28/8380 260     MPV 10/20/2020 11 0     Cholesterol 10/20/2020 202*    Triglycerides 10/20/2020 525*    HDL, Direct 10/20/2020 40     LDL Calculated 10/20/2020      Non-HDL-Chol (CHOL-HDL) 10/20/2020 162         Imaging: No results found  Objective:     Physical Exam  Constitutional:       General: He is not in acute distress  Appearance: He is not ill-appearing  HENT:      Head: Normocephalic and atraumatic  Cardiovascular:      Rate and Rhythm: Normal rate  Pulmonary:      Effort: Pulmonary effort is normal  No respiratory distress  Skin:     General: Skin is warm and dry  Neurological:      Mental Status: He is oriented to person, place, and time  There are no Patient Instructions on file for this visit  FEROZ Ugarte    Portions of the record may have been created with voice recognition software  Occasional wrong word or "sound a like" substitutions may have occurred due to the inherent limitations of voice recognition software  Read the chart carefully and recognize, using context, where substitutions have occurred

## 2021-04-19 DIAGNOSIS — F41.9 ANXIETY: ICD-10-CM

## 2021-04-19 NOTE — TELEPHONE ENCOUNTER
[facility] refill request for Clonazepam 5 mg   PDMP web site checked last known refill on 03/09/ Dispense QTY 45 for 22 days

## 2021-04-20 RX ORDER — CLONAZEPAM 0.5 MG/1
0.5 TABLET ORAL 2 TIMES DAILY PRN
Qty: 45 TABLET | Refills: 0 | Status: SHIPPED | OUTPATIENT
Start: 2021-04-20 | End: 2021-06-04 | Stop reason: SDUPTHER

## 2021-06-04 DIAGNOSIS — F41.9 ANXIETY: ICD-10-CM

## 2021-06-04 NOTE — TELEPHONE ENCOUNTER
[facility] refill request for Clonazepam 0 5 mg   PDMP web site checked last refill occurred on 4/20/2021 dispense Qty 45 for 23 days

## 2021-06-07 RX ORDER — CLONAZEPAM 0.5 MG/1
0.5 TABLET ORAL 2 TIMES DAILY PRN
Qty: 45 TABLET | Refills: 0 | Status: SHIPPED | OUTPATIENT
Start: 2021-06-07 | End: 2021-07-22 | Stop reason: SDUPTHER

## 2021-06-17 ENCOUNTER — OFFICE VISIT (OUTPATIENT)
Dept: FAMILY MEDICINE CLINIC | Facility: CLINIC | Age: 41
End: 2021-06-17
Payer: COMMERCIAL

## 2021-06-17 VITALS
WEIGHT: 205 LBS | HEIGHT: 67 IN | OXYGEN SATURATION: 92 % | BODY MASS INDEX: 32.18 KG/M2 | DIASTOLIC BLOOD PRESSURE: 80 MMHG | HEART RATE: 80 BPM | SYSTOLIC BLOOD PRESSURE: 136 MMHG | RESPIRATION RATE: 16 BRPM | TEMPERATURE: 97.2 F

## 2021-06-17 DIAGNOSIS — R10.9 ABDOMINAL PAIN, UNSPECIFIED ABDOMINAL LOCATION: Primary | ICD-10-CM

## 2021-06-17 DIAGNOSIS — E78.2 MIXED HYPERLIPIDEMIA: ICD-10-CM

## 2021-06-17 DIAGNOSIS — F41.9 ANXIETY: ICD-10-CM

## 2021-06-17 DIAGNOSIS — J45.909 EXTRINSIC ASTHMA WITHOUT COMPLICATION, UNSPECIFIED ASTHMA SEVERITY, UNSPECIFIED WHETHER PERSISTENT: ICD-10-CM

## 2021-06-17 PROCEDURE — 99214 OFFICE O/P EST MOD 30 MIN: CPT | Performed by: FAMILY MEDICINE

## 2021-06-17 RX ORDER — ALBUTEROL SULFATE 90 UG/1
2 AEROSOL, METERED RESPIRATORY (INHALATION) EVERY 6 HOURS PRN
Qty: 18 G | Refills: 5 | Status: SHIPPED | OUTPATIENT
Start: 2021-06-17

## 2021-06-17 NOTE — PROGRESS NOTES
BMI Counseling: Body mass index is 32 11 kg/m²  The BMI is above normal  Nutrition recommendations include decreasing portion sizes, decreasing fast food intake, limiting drinks that contain sugar, moderation in carbohydrate intake, increasing intake of lean protein, reducing intake of saturated and trans fat and reducing intake of cholesterol  Exercise recommendations include moderate physical activity 150 minutes/week and exercising 3-5 times per week  No pharmacotherapy was ordered  Assessment/Plan:     Chronic Problems:  GUCCI (generalized anxiety disorder)   Stable, improving control, encourage continued self-help mechanisms, decreasing use benzos    Mixed hyperlipidemia   Will obtain updated lipid profile,, continue encourage stressed dietary modifications exercise program, Omega threes, fiber      Visit Diagnosis:  Diagnoses and all orders for this visit:    Abdominal pain, unspecified abdominal location  Comments:   discussed with patient, muscle strain  Orders:  -     Comprehensive metabolic panel; Future  -     CBC and differential; Future  -     Lipid panel; Future  -     Lipase; Future  -     Amylase; Future    Anxiety    Mixed hyperlipidemia    Extrinsic asthma without complication, unspecified asthma severity, unspecified whether persistent  -     albuterol (Ventolin HFA) 90 mcg/act inhaler; Inhale 2 puffs every 6 (six) hours as needed for wheezing          Subjective:    Patient ID: Braeden Blake is a 47 y o  male      Mild left sided abd pain , lateral aspect  Has been stressed anxious in regard to pancreas , since our last discussion in regard to last chol readings  Working and diet weight control, exercising regularly  Possibly tomuch splitting of avila, discomfort is reproducible with twisting turning  movementsd  No history   Noted while sharpening chain saw has been working on the cutting of wood , 250 /cord   Neg fever chill, neg n,v,d,c   Anxiety considering , expecting a baby , who has need extra care , pub procedure , invitro infusions to baby   Has yet to go for the labs in regard to chol , has been taking the fenofibrate religiously   Diet has been making     Abdominal Pain  The onset quality is undetermined  The problem occurs intermittently  The pain is located in the left flank  Pertinent negatives include no arthralgias, constipation, diarrhea, dysuria, fever, frequency, headaches, hematuria, myalgias, nausea or vomiting  The following portions of the patient's history were reviewed and updated as appropriate: allergies, current medications, past family history, past medical history, past social history, past surgical history and problem list     Review of Systems   Constitutional: Negative for appetite change, chills, fever and unexpected weight change  HENT: Negative for congestion, dental problem, ear pain, hearing loss, postnasal drip, rhinorrhea, sinus pressure, sinus pain, sneezing, sore throat, tinnitus and voice change  Eyes: Negative for visual disturbance  Respiratory: Positive for cough  Negative for apnea, chest tightness and shortness of breath  Cardiovascular: Negative for chest pain, palpitations and leg swelling  Gastrointestinal: Positive for abdominal pain  Negative for blood in stool, constipation, diarrhea, nausea and vomiting  Endocrine: Negative for cold intolerance, heat intolerance, polydipsia, polyphagia and polyuria  Genitourinary: Negative for decreased urine volume, difficulty urinating, dysuria, frequency and hematuria  Musculoskeletal: Negative for arthralgias, back pain, gait problem, joint swelling and myalgias  Skin: Negative for color change, rash and wound  Allergic/Immunologic: Negative for environmental allergies and food allergies  Neurological: Negative for dizziness, syncope, weakness, light-headedness, numbness and headaches  Hematological: Negative for adenopathy  Does not bruise/bleed easily  Psychiatric/Behavioral: Negative for sleep disturbance and suicidal ideas  The patient is not nervous/anxious  /80   Pulse 80   Temp (!) 97 2 °F (36 2 °C)   Resp 16   Ht 5' 7" (1 702 m)   Wt 93 kg (205 lb)   SpO2 92%   BMI 32 11 kg/m²   Social History     Socioeconomic History    Marital status: /Civil Union     Spouse name: Not on file    Number of children: Not on file    Years of education: Not on file    Highest education level: Not on file   Occupational History    Not on file   Tobacco Use    Smoking status: Former Smoker     Packs/day: 1 00     Years: 4 00     Pack years: 4 00     Types: Cigarettes     Quit date:      Years since quittin 4    Smokeless tobacco: Current User     Types: Chew   Substance and Sexual Activity    Alcohol use: Yes     Comment: rarely    Drug use: Yes     Types: Marijuana     Comment: occasional, no illicit drug use as per allscripts    Sexual activity: Not on file   Other Topics Concern    Not on file   Social History Narrative    Always uses seat belt    Daily caffeine consumption 2-3 servings a day    Employed    Lives with children    Lives with spouse             Social Determinants of Health     Financial Resource Strain:     Difficulty of Paying Living Expenses:    Food Insecurity:     Worried About [address] in the Last Year:     [address] [facility] [address] in the Last Year:    Transportation Needs:     Lack of Transportation (Medical):      Lack of Transportation (Non-Medical):    Physical Activity:     Days of Exercise per Week:     Minutes of Exercise per Session:    Stress:     Feeling of Stress :    Social Connections:     Frequency of Communication with Friends and Family:     Frequency of Social Gatherings with Friends and Family:     Attends Sikhism Services:     Active Member of Clubs or Organizations:     Attends Club or Organization Meetings:     Marital Status:    Intimate Partner Violence:     Fear of Current or Ex-Partner:     Emotionally Abused:     Physically Abused:     Sexually Abused:      Past Medical History:   Diagnosis Date    Anxiety     Hemorrhoids      Family History   Problem Relation Age of Onset    Hypertension Mother     Hyperlipidemia Mother     Heart disease Other         cardiac disease    Hypertension Other     Hyperlipidemia Other     Heart disease Other         cardiac disease    Hypertension Other     Hyperlipidemia Other      Past Surgical History:   Procedure Laterality Date    HERNIA REPAIR         Current Outpatient Medications:     clonazePAM (KlonoPIN) 0 5 mg tablet, Take 1 tablet (0 5 mg total) by mouth 2 (two) times a day as needed for anxiety or seizures, Disp: 45 tablet, Rfl: 0    escitalopram (LEXAPRO) 20 mg tablet, TAKE 1 TABLET BY MOUTH EVERY DAY, Disp: 90 tablet, Rfl: 1    fenofibrate (TRICOR) 145 mg tablet, TAKE 1 TABLET BY MOUTH EVERY DAY, Disp: 90 tablet, Rfl: 1    albuterol (PROVENTIL HFA,VENTOLIN HFA) 90 mcg/act inhaler, Inhale 2 puffs every 6 (six) hours as needed for wheezing or shortness of breath (Patient not taking: Reported on 6/17/2021), Disp: 1 Inhaler, Rfl: 0    albuterol (Ventolin HFA) 90 mcg/act inhaler, Inhale 2 puffs every 6 (six) hours as needed for wheezing, Disp: 18 g, Rfl: 5    Allergies   Allergen Reactions    Penicillins Hives          Lab Review   No visits with results within 6 Month(s) from this visit     Latest known visit with results is:   Lab on 10/20/2020   Component Date Value    HIV-1/HIV-2 Ab 10/20/2020 Non-Reactive     Sodium 10/20/2020 139     Potassium 10/20/2020 3 9     Chloride 10/20/2020 105     CO2 10/20/2020 30     ANION GAP 10/20/2020 4     BUN 10/20/2020 15     Creatinine 10/20/2020 0 93     Glucose, Fasting 10/20/2020 102*    Calcium 10/20/2020 9 9     AST 10/20/2020 16     ALT 10/20/2020 37     Alkaline Phosphatase 10/20/2020 75     Total Protein 10/20/2020 8 0     Albumin 10/20/2020 4 3  Total Bilirubin 10/20/2020 0 42     eGFR 10/20/2020 104     WBC 10/20/2020 10 64*    RBC 10/20/2020 5 10     Hemoglobin 10/20/2020 15 3     Hematocrit 10/20/2020 45 5     MCV 10/20/2020 89     MCH 10/20/2020 30 0     MCHC 10/20/2020 33 6     RDW 10/20/2020 11 9     Platelets 59/69/7433 260     MPV 10/20/2020 11 0     Cholesterol 10/20/2020 202*    Triglycerides 10/20/2020 525*    HDL, Direct 10/20/2020 40     LDL Calculated 10/20/2020      Non-HDL-Chol (CHOL-HDL) 10/20/2020 162         Imaging: No results found  Objective:     Physical Exam  Constitutional:       General: He is not in acute distress  Appearance: He is well-developed  He is not ill-appearing, toxic-appearing or diaphoretic  HENT:      Head: Normocephalic and atraumatic  Cardiovascular:      Rate and Rhythm: Normal rate and regular rhythm  Heart sounds: Normal heart sounds  Pulmonary:      Effort: Pulmonary effort is normal       Breath sounds: Normal breath sounds  Abdominal:      General: Bowel sounds are normal       Palpations: Abdomen is soft  Tenderness: There is no abdominal tenderness  Comments:  Left lateral abdomen discomfort reproducible twisting rotation movements, negative deformity guarding   Musculoskeletal:         General: Normal range of motion  Cervical back: Normal range of motion and neck supple  Skin:     General: Skin is warm and dry  Neurological:      Mental Status: He is alert and oriented to person, place, and time  Deep Tendon Reflexes: Reflexes are normal and symmetric  Psychiatric:         Behavior: Behavior normal          Thought Content: Thought content normal          Judgment: Judgment normal            There are no Patient Instructions on file for this visit  FEROZ Squires    Portions of the record may have been created with voice recognition software    Occasional wrong word or "sound a like" substitutions may have occurred due to the inherent limitations of voice recognition software  Read the chart carefully and recognize, using context, where substitutions have occurred

## 2021-06-17 NOTE — ASSESSMENT & PLAN NOTE
Will obtain updated lipid profile,, continue encourage stressed dietary modifications exercise program, Omega threes, fiber

## 2021-07-12 DIAGNOSIS — F41.1 GAD (GENERALIZED ANXIETY DISORDER): ICD-10-CM

## 2021-07-12 RX ORDER — ESCITALOPRAM OXALATE 20 MG/1
TABLET ORAL
Qty: 90 TABLET | Refills: 1 | Status: SHIPPED | OUTPATIENT
Start: 2021-07-12 | End: 2022-03-29 | Stop reason: SDUPTHER

## 2021-07-22 DIAGNOSIS — F41.9 ANXIETY: ICD-10-CM

## 2021-07-22 NOTE — TELEPHONE ENCOUNTER
[facility] line refill request for Clonazepam 0 5 mg PDMP website checked last known refill occurred on 6/7/21 dispense QTY 45

## 2021-07-25 RX ORDER — CLONAZEPAM 0.5 MG/1
0.5 TABLET ORAL 2 TIMES DAILY PRN
Qty: 45 TABLET | Refills: 0 | Status: SHIPPED | OUTPATIENT
Start: 2021-07-25 | End: 2021-09-07 | Stop reason: SDUPTHER

## 2021-09-07 ENCOUNTER — OFFICE VISIT (OUTPATIENT)
Dept: FAMILY MEDICINE CLINIC | Facility: CLINIC | Age: 41
End: 2021-09-07
Payer: COMMERCIAL

## 2021-09-07 VITALS
BODY MASS INDEX: 33.34 KG/M2 | HEART RATE: 70 BPM | DIASTOLIC BLOOD PRESSURE: 80 MMHG | WEIGHT: 212.4 LBS | TEMPERATURE: 98.8 F | OXYGEN SATURATION: 97 % | RESPIRATION RATE: 18 BRPM | HEIGHT: 67 IN | SYSTOLIC BLOOD PRESSURE: 120 MMHG

## 2021-09-07 DIAGNOSIS — L25.5 RHUS DERMATITIS: Primary | ICD-10-CM

## 2021-09-07 DIAGNOSIS — F41.9 ANXIETY: ICD-10-CM

## 2021-09-07 PROCEDURE — 99214 OFFICE O/P EST MOD 30 MIN: CPT | Performed by: FAMILY MEDICINE

## 2021-09-07 RX ORDER — PREDNISONE 20 MG/1
20 TABLET ORAL 2 TIMES DAILY WITH MEALS
Qty: 10 TABLET | Refills: 0 | Status: SHIPPED | OUTPATIENT
Start: 2021-09-07

## 2021-09-07 RX ORDER — CLONAZEPAM 0.5 MG/1
0.5 TABLET ORAL 2 TIMES DAILY PRN
Qty: 45 TABLET | Refills: 0 | Status: SHIPPED | OUTPATIENT
Start: 2021-09-07 | End: 2021-10-21 | Stop reason: SDUPTHER

## 2021-09-07 NOTE — ASSESSMENT & PLAN NOTE
Discussed plan care, treatment, avoidance, prednisone 20 mg p o  b i d , may continue OTC antihistamine

## 2021-09-07 NOTE — PROGRESS NOTES
BMI Counseling: Body mass index is 33 27 kg/m²  The BMI is above normal  Nutrition recommendations include decreasing portion sizes, decreasing fast food intake, limiting drinks that contain sugar, moderation in carbohydrate intake, increasing intake of lean protein, reducing intake of saturated and trans fat and reducing intake of cholesterol  Exercise recommendations include moderate physical activity 150 minutes/week and exercising 3-5 times per week  No pharmacotherapy was ordered  Tobacco Cessation Counseling: Tobacco cessation counseling was not provided  The patient is sincerely urged to quit consumption of tobacco  He is ready to quit tobacco  Medication options discussed  Assessment/Plan:     Chronic Problems:  No problem-specific Assessment & Plan notes found for this encounter  Visit Diagnosis:  There are no diagnoses linked to this encounter  Subjective:    Patient ID: Eveline Ingram is a 47 y o  male  Here for poison ivy, began over the weekend, spreading in in around eyes using OTC products with mild benefit has no erupting rash in various parts of body to include:  Groin area very itchy   occurred, contact yd work   denies any breathing cough sore throat   anxiety stable, continues to deal with stressors involved with infant illnesses   has bilateral medications Klonopin,  Is very aware of pill count, intake, continues to strive to decrease medications intake      The following portions of the patient's history were reviewed and updated as appropriate: allergies, current medications, past family history, past medical history, past social history, past surgical history and problem list     Review of Systems   Constitutional: Negative for appetite change, chills, fever and unexpected weight change  HENT: Negative for congestion, dental problem, ear pain, hearing loss, postnasal drip, rhinorrhea, sinus pressure, sinus pain, sneezing, sore throat, tinnitus and voice change  Eyes: Negative for visual disturbance  Respiratory: Negative for apnea, cough, chest tightness and shortness of breath  Cardiovascular: Negative for chest pain, palpitations and leg swelling  Gastrointestinal: Negative for abdominal pain, blood in stool, constipation, diarrhea, nausea and vomiting  Endocrine: Negative for cold intolerance, heat intolerance, polydipsia, polyphagia and polyuria  Genitourinary: Negative for decreased urine volume, difficulty urinating, dysuria, frequency and hematuria  Musculoskeletal: Negative for arthralgias, back pain, gait problem, joint swelling and myalgias  Skin: Positive for rash  Negative for color change and wound  Allergic/Immunologic: Negative for environmental allergies and food allergies  Neurological: Negative for dizziness, syncope, weakness, light-headedness, numbness and headaches  Hematological: Negative for adenopathy  Does not bruise/bleed easily  Psychiatric/Behavioral: Negative for sleep disturbance and suicidal ideas  The patient is not nervous/anxious            /80   Pulse 70   Temp 98 8 °F (37 1 °C) (Tympanic)   Resp 18   Ht 5' 7" (1 702 m)   Wt 96 3 kg (212 lb 6 4 oz)   SpO2 97%   BMI 33 27 kg/m²   Social History     Socioeconomic History    Marital status: /Civil Union     Spouse name: Not on file    Number of children: Not on file    Years of education: Not on file    Highest education level: Not on file   Occupational History    Not on file   Tobacco Use    Smoking status: Former Smoker     Packs/day: 1 00     Years: 4 00     Pack years: 4 00     Types: Cigarettes     Quit date:      Years since quittin 6    Smokeless tobacco: Current User     Types: Chew   Substance and Sexual Activity    Alcohol use: Yes     Comment: rarely    Drug use: Yes     Types: Marijuana     Comment: occasional, no illicit drug use as per allscripts    Sexual activity: Not on file   Other Topics Concern    Not on file Social History Narrative    Always uses seat belt    Daily caffeine consumption 2-3 servings a day    Employed    Lives with children    Lives with spouse             Social Determinants of Health     Financial Resource Strain:     Difficulty of Paying Living Expenses:    Food Insecurity:     Worried About Running Out of Food in the Last Year:     Ran Out of Food in the Last Year:    Transportation Needs:     Lack of Transportation (Medical):      Lack of Transportation (Non-Medical):    Physical Activity:     Days of Exercise per Week:     Minutes of Exercise per Session:    Stress:     Feeling of Stress :    Social Connections:     Frequency of Communication with Friends and Family:     Frequency of Social Gatherings with Friends and Family:     Attends [employer] Services:     Active Member of Clubs or Organizations:     Attends Club or Organization Meetings:     Marital Status:    Intimate Partner Violence:     Fear of Current or Ex-Partner:     Emotionally Abused:     Physically Abused:     Sexually Abused:      Past Medical History:   Diagnosis Date    Anxiety     Hemorrhoids      Family History   Problem Relation Age of Onset    Hypertension Mother     Hyperlipidemia Mother     Heart disease Other         cardiac disease    Hypertension Other     Hyperlipidemia Other     Heart disease Other         cardiac disease    Hypertension Other     Hyperlipidemia Other      Past Surgical History:   Procedure Laterality Date    HERNIA REPAIR         Current Outpatient Medications:     albuterol (PROVENTIL HFA,VENTOLIN HFA) 90 mcg/act inhaler, Inhale 2 puffs every 6 (six) hours as needed for wheezing or shortness of breath, Disp: 1 Inhaler, Rfl: 0    albuterol (Ventolin HFA) 90 mcg/act inhaler, Inhale 2 puffs every 6 (six) hours as needed for wheezing, Disp: 18 g, Rfl: 5    clonazePAM (KlonoPIN) 0 5 mg tablet, Take 1 tablet (0 5 mg total) by mouth 2 (two) times a day as needed for anxiety or seizures, Disp: 45 tablet, Rfl: 0    escitalopram (LEXAPRO) 20 mg tablet, TAKE 1 TABLET BY MOUTH EVERY DAY, Disp: 90 tablet, Rfl: 1    fenofibrate (TRICOR) 145 mg tablet, TAKE 1 TABLET BY MOUTH EVERY DAY, Disp: 90 tablet, Rfl: 1    Allergies   Allergen Reactions    Penicillins Hives          Lab Review   No visits with results within 6 Month(s) from this visit  Latest known visit with results is:   Lab on 10/20/2020   Component Date Value    HIV-1/HIV-2 Ab 10/20/2020 Non-Reactive     Sodium 10/20/2020 139     Potassium 10/20/2020 3 9     Chloride 10/20/2020 105     CO2 10/20/2020 30     ANION GAP 10/20/2020 4     BUN 10/20/2020 15     Creatinine 10/20/2020 0 93     Glucose, Fasting 10/20/2020 102*    Calcium 10/20/2020 9 9     AST 10/20/2020 16     ALT 10/20/2020 37     Alkaline Phosphatase 10/20/2020 75     Total Protein 10/20/2020 8 0     Albumin 10/20/2020 4 3     Total Bilirubin 10/20/2020 0 42     eGFR 10/20/2020 104     WBC 10/20/2020 10 64*    RBC 10/20/2020 5 10     Hemoglobin 10/20/2020 15 3     Hematocrit 10/20/2020 45 5     MCV 10/20/2020 89     MCH 10/20/2020 30 0     MCHC 10/20/2020 33 6     RDW 10/20/2020 11 9     Platelets 77/52/2119 260     MPV 10/20/2020 11 0     Cholesterol 10/20/2020 202*    Triglycerides 10/20/2020 525*    HDL, Direct 10/20/2020 40     LDL Calculated 10/20/2020      Non-HDL-Chol (CHOL-HDL) 10/20/2020 603      not [id number]     Imaging: No results found  Objective:     Physical Exam  Constitutional:       General: He is not in acute distress  Appearance: He is not ill-appearing or toxic-appearing  Skin:     Findings: Rash present  Comments:  Maculopapular eruptions, linear in nature bilateral arms neck area mild erythema periorbital area   pruritic           There are no Patient Instructions on file for this visit  FEROZ Parra    Portions of the record may have been created with voice recognition software  Occasional wrong word or "sound a like" substitutions may have occurred due to the inherent limitations of voice recognition software  Read the chart carefully and recognize, using context, where substitutions have occurred

## 2021-09-22 PROCEDURE — U0005 INFEC AGEN DETEC AMPLI PROBE: HCPCS | Performed by: FAMILY MEDICINE

## 2021-09-22 PROCEDURE — U0003 INFECTIOUS AGENT DETECTION BY NUCLEIC ACID (DNA OR RNA); SEVERE ACUTE RESPIRATORY SYNDROME CORONAVIRUS 2 (SARS-COV-2) (CORONAVIRUS DISEASE [COVID-19]), AMPLIFIED PROBE TECHNIQUE, MAKING USE OF HIGH THROUGHPUT TECHNOLOGIES AS DESCRIBED BY CMS-2020-01-R: HCPCS | Performed by: FAMILY MEDICINE

## 2021-09-23 ENCOUNTER — TELEMEDICINE (OUTPATIENT)
Dept: FAMILY MEDICINE CLINIC | Facility: CLINIC | Age: 41
End: 2021-09-23
Payer: COMMERCIAL

## 2021-09-23 DIAGNOSIS — B34.9 PHARYNGITIS WITH VIRAL SYNDROME: Primary | ICD-10-CM

## 2021-09-23 DIAGNOSIS — J02.9 PHARYNGITIS WITH VIRAL SYNDROME: Primary | ICD-10-CM

## 2021-09-23 PROCEDURE — 99213 OFFICE O/P EST LOW 20 MIN: CPT | Performed by: FAMILY MEDICINE

## 2021-09-23 NOTE — PROGRESS NOTES
COVID-19 Outpatient Progress Note    Assessment/Plan:    Problem List Items Addressed This Visit     None         COVID-19 Plan     Verification of patient location:    Patient is located in the following state in which I hold an active license {[facility] virtual patient location:[address]}    Encounter provider FEROZ Branch    Provider located at [facility] [address] [address] [phone]    Recent Visits  No visits were found meeting these conditions  Showing recent visits within past 7 days and meeting all other requirements  Future Appointments  No visits were found meeting these conditions  Showing future appointments within next 150 days and meeting all other requirements     COVID-19 HPI  Lab Results   Component Value Date    SARSCOV2 Negative 09/22/2021     Past Medical History:   Diagnosis Date    Anxiety     Hemorrhoids      Past Surgical History:   Procedure Laterality Date    HERNIA REPAIR       Current Outpatient Medications   Medication Sig Dispense Refill    albuterol (PROVENTIL HFA,VENTOLIN HFA) 90 mcg/act inhaler Inhale 2 puffs every 6 (six) hours as needed for wheezing or shortness of breath 1 Inhaler 0    albuterol (Ventolin HFA) 90 mcg/act inhaler Inhale 2 puffs every 6 (six) hours as needed for wheezing 18 g 5    clonazePAM (KlonoPIN) 0 5 mg tablet Take 1 tablet (0 5 mg total) by mouth 2 (two) times a day as needed for anxiety or seizures 45 tablet 0    escitalopram (LEXAPRO) 20 mg tablet TAKE 1 TABLET BY MOUTH EVERY DAY 90 tablet 1    fenofibrate (TRICOR) 145 mg tablet TAKE 1 TABLET BY MOUTH EVERY DAY 90 tablet 1    predniSONE 20 mg tablet Take 1 tablet (20 mg total) by mouth 2 (two) times a day with meals 10 tablet 0     No current facility-administered medications for this visit  Allergies   Allergen Reactions    Penicillins Hives       Review of Systems    Objective:     There were no vitals filed for this visit  Physical Exam    VIRTUAL VISIT DISCLAIMER    Omari Eli verbally agrees to participate in [employer]  Pt is aware that [employer] could be limited without vital signs or the ability to perform a full hands-on physical Dbebie Orourke understands he or the provider may request at any time to terminate the video visit and request the patient to seek care or treatment in person  Virtual Regular Visit    Verification of patient location:    Patient is located in the following state in which I hold an active license { amb virtual patient location:[address]}      Assessment/Plan:    Problem List Items Addressed This Visit     None               Reason for visit is   Chief Complaint   Patient presents with    COVID-19    Virtual Regular Visit        Encounter provider FEROZ Hewitt    Provider located at [facility] [address] [address] [phone]      Recent Visits  No visits were found meeting these conditions  Showing recent visits within past 7 days and meeting all other requirements  Future Appointments  No visits were found meeting these conditions  Showing future appointments within next 150 days and meeting all other requirements       The patient was identified by name and date of birth  Omari Eli was informed that this is a telemedicine visit and that the visit is being conducted through {AMB VIRTUAL VISIT YYOA:40293}  {Telemedicine confidentiality :50138} {Telemedicine participants:09303}  He acknowledged consent and understanding of privacy and security of the video platform  The patient has agreed to participate and understands they can discontinue the visit at any time  Patient is aware this is a billable service  Subjective  Omari Eli is a 46 y o  male ***         HPI     Past Medical History:   Diagnosis Date    Anxiety     Hemorrhoids        Past Surgical History:   Procedure Laterality Date    HERNIA REPAIR         Current Outpatient Medications   Medication Sig Dispense Refill    albuterol (PROVENTIL HFA,VENTOLIN HFA) 90 mcg/act inhaler Inhale 2 puffs every 6 (six) hours as needed for wheezing or shortness of breath 1 Inhaler 0    albuterol (Ventolin HFA) 90 mcg/act inhaler Inhale 2 puffs every 6 (six) hours as needed for wheezing 18 g 5    clonazePAM (KlonoPIN) 0 5 mg tablet Take 1 tablet (0 5 mg total) by mouth 2 (two) times a day as needed for anxiety or seizures 45 tablet 0    escitalopram (LEXAPRO) 20 mg tablet TAKE 1 TABLET BY MOUTH EVERY DAY 90 tablet 1    fenofibrate (TRICOR) 145 mg tablet TAKE 1 TABLET BY MOUTH EVERY DAY 90 tablet 1    predniSONE 20 mg tablet Take 1 tablet (20 mg total) by mouth 2 (two) times a day with meals 10 tablet 0     No current facility-administered medications for this visit  Allergies   Allergen Reactions    Penicillins Hives           Video Exam    There were no vitals filed for this visit  Physical Exam     {Time Spent:89738}    VIRTUAL VISIT DISCLAIMER      Najma Jo verbally agrees to participate in [employer]  Pt is aware that [employer] could be limited without vital signs or the ability to perform a full hands-on physical Inocencio Kothari understands he or the provider may request at any time to terminate the video visit and request the patient to seek care or treatment in person

## 2021-09-24 NOTE — PROGRESS NOTES
Virtual Regular Visit    Verification of patient location:    Patient is located in the following state in which I hold an active license [place]      Assessment/Plan:    Problem List Items Addressed This Visit     None      Visit Diagnoses     Pharyngitis with viral syndrome    -  Primary       discussed in reviewed patient COVID negative, recommended symptomatic care, schedule in office evaluation       Reason for visit is   Chief Complaint   Patient presents with    COVID-19    Virtual Regular Visit    Virtual Regular Visit        Encounter provider FEROZ Allen    Provider located at [facility] [address] [facility]  [address] [facility] [address] [phone]      Recent Visits  Date Type Provider Dept   09/23/21 Telemedicine Nicole Campbell, [address] recent visits within past 7 days and meeting all other requirements  Future Appointments  No visits were found meeting these conditions  Showing future appointments within next 150 days and meeting all other requirements       The patient was identified by name and date of birth  Winston Murphy was informed that this is a telemedicine visit and that the visit is being conducted through [address] [facility] and patient was informed that this is a secure, HIPAA-compliant platform  He agrees to proceed     My office door was closed  No one else was in the room  He acknowledged consent and understanding of privacy and security of the video platform  The patient has agreed to participate and understands they can discontinue the visit at any time  Patient is aware this is a billable service  Subjective  Winston Murphy is a 46 y o  male    Follow-up covert testing, presently well has seen results of covert testing my chart, concerns prior to mild cough, negative fever chills headache loss of taste or smell, has had mild sore throat congestion  Admittedly anxiety may have been playing a bit  viral       Past Medical History:   Diagnosis Date    Anxiety     Hemorrhoids        Past Surgical History:   Procedure Laterality Date    HERNIA REPAIR         Current Outpatient Medications   Medication Sig Dispense Refill    albuterol (PROVENTIL HFA,VENTOLIN HFA) 90 mcg/act inhaler Inhale 2 puffs every 6 (six) hours as needed for wheezing or shortness of breath 1 Inhaler 0    albuterol (Ventolin HFA) 90 mcg/act inhaler Inhale 2 puffs every 6 (six) hours as needed for wheezing 18 g 5    clonazePAM (KlonoPIN) 0 5 mg tablet Take 1 tablet (0 5 mg total) by mouth 2 (two) times a day as needed for anxiety or seizures 45 tablet 0    escitalopram (LEXAPRO) 20 mg tablet TAKE 1 TABLET BY MOUTH EVERY DAY 90 tablet 1    fenofibrate (TRICOR) 145 mg tablet TAKE 1 TABLET BY MOUTH EVERY DAY 90 tablet 1    predniSONE 20 mg tablet Take 1 tablet (20 mg total) by mouth 2 (two) times a day with meals 10 tablet 0     No current facility-administered medications for this visit  Allergies   Allergen Reactions    Penicillins Hives       Review of Systems   Constitutional: Negative for appetite change, chills, fever and unexpected weight change  HENT: Negative for congestion, dental problem, ear pain, hearing loss, postnasal drip, rhinorrhea, sinus pressure, sinus pain, sneezing, sore throat, tinnitus and voice change  Eyes: Negative for visual disturbance  Respiratory: Negative for apnea, cough, chest tightness and shortness of breath  Cardiovascular: Negative for chest pain, palpitations and leg swelling  Gastrointestinal: Negative for abdominal pain, blood in stool, constipation, diarrhea, nausea and vomiting  Endocrine: Negative for cold intolerance, heat intolerance, polydipsia, polyphagia and polyuria  Genitourinary: Negative for decreased urine volume, difficulty urinating, dysuria, frequency and hematuria     Musculoskeletal: Negative for arthralgias, back pain, gait problem, joint swelling and myalgias  Skin: Negative for color change, rash and wound  Allergic/Immunologic: Negative for environmental allergies and food allergies  Neurological: Negative for dizziness, syncope, weakness, light-headedness, numbness and headaches  Hematological: Negative for adenopathy  Does not bruise/bleed easily  Psychiatric/Behavioral: Negative for sleep disturbance and suicidal ideas  The patient is not nervous/anxious  Video Exam    There were no vitals filed for this visit  Physical Exam  Constitutional:       General: He is not in acute distress  Appearance: He is not ill-appearing  HENT:      Head: Normocephalic and atraumatic  Pulmonary:      Effort: Pulmonary effort is normal  No respiratory distress  I spent 20 minutes directly with the patient during this visit    VIRTUAL VISIT DISCLAIMER      Monik Stewart verbally agrees to participate in [employer]  Pt is aware that [address] could be limited without vital signs or the ability to perform a full hands-on physical Fermarily Varner understands he or the provider may request at any time to terminate the video visit and request the patient to seek care or treatment in person

## 2021-10-21 DIAGNOSIS — F41.9 ANXIETY: ICD-10-CM

## 2021-10-24 RX ORDER — CLONAZEPAM 0.5 MG/1
0.5 TABLET ORAL 2 TIMES DAILY PRN
Qty: 45 TABLET | Refills: 0 | Status: SHIPPED | OUTPATIENT
Start: 2021-10-24 | End: 2021-12-09 | Stop reason: SDUPTHER

## 2021-11-08 ENCOUNTER — APPOINTMENT (OUTPATIENT)
Dept: LAB | Facility: CLINIC | Age: 41
End: 2021-11-08
Payer: COMMERCIAL

## 2021-11-08 DIAGNOSIS — R10.9 ABDOMINAL PAIN, UNSPECIFIED ABDOMINAL LOCATION: ICD-10-CM

## 2021-11-08 LAB
ALBUMIN SERPL BCP-MCNC: 4.4 G/DL (ref 3.5–5)
ALP SERPL-CCNC: 77 U/L (ref 46–116)
ALT SERPL W P-5'-P-CCNC: 52 U/L (ref 12–78)
AMYLASE SERPL-CCNC: 29 IU/L (ref 25–115)
ANION GAP SERPL CALCULATED.3IONS-SCNC: 5 MMOL/L (ref 4–13)
AST SERPL W P-5'-P-CCNC: 25 U/L (ref 5–45)
BASOPHILS # BLD AUTO: 0.03 THOUSANDS/ΜL (ref 0–0.1)
BASOPHILS NFR BLD AUTO: 0 % (ref 0–1)
BILIRUB SERPL-MCNC: 0.54 MG/DL (ref 0.2–1)
BUN SERPL-MCNC: 13 MG/DL (ref 5–25)
CALCIUM SERPL-MCNC: 9.6 MG/DL (ref 8.3–10.1)
CHLORIDE SERPL-SCNC: 105 MMOL/L (ref 100–108)
CHOLEST SERPL-MCNC: 213 MG/DL (ref 50–200)
CO2 SERPL-SCNC: 26 MMOL/L (ref 21–32)
CREAT SERPL-MCNC: 1.1 MG/DL (ref 0.6–1.3)
EOSINOPHIL # BLD AUTO: 1.49 THOUSAND/ΜL (ref 0–0.61)
EOSINOPHIL NFR BLD AUTO: 19 % (ref 0–6)
ERYTHROCYTE [DISTWIDTH] IN BLOOD BY AUTOMATED COUNT: 11.9 % (ref 11.6–15.1)
GFR SERPL CREATININE-BSD FRML MDRD: 84 ML/MIN/1.73SQ M
GLUCOSE P FAST SERPL-MCNC: 101 MG/DL (ref 65–99)
HCT VFR BLD AUTO: 46.8 % (ref 36.5–49.3)
HDLC SERPL-MCNC: 34 MG/DL
HGB BLD-MCNC: 15.6 G/DL (ref 12–17)
IMM GRANULOCYTES # BLD AUTO: 0.03 THOUSAND/UL (ref 0–0.2)
IMM GRANULOCYTES NFR BLD AUTO: 0 % (ref 0–2)
LDLC SERPL CALC-MCNC: 112 MG/DL (ref 0–100)
LIPASE SERPL-CCNC: 90 U/L (ref 73–393)
LYMPHOCYTES # BLD AUTO: 2.07 THOUSANDS/ΜL (ref 0.6–4.47)
LYMPHOCYTES NFR BLD AUTO: 27 % (ref 14–44)
MCH RBC QN AUTO: 29.2 PG (ref 26.8–34.3)
MCHC RBC AUTO-ENTMCNC: 33.3 G/DL (ref 31.4–37.4)
MCV RBC AUTO: 88 FL (ref 82–98)
MONOCYTES # BLD AUTO: 0.64 THOUSAND/ΜL (ref 0.17–1.22)
MONOCYTES NFR BLD AUTO: 8 % (ref 4–12)
NEUTROPHILS # BLD AUTO: 3.44 THOUSANDS/ΜL (ref 1.85–7.62)
NEUTS SEG NFR BLD AUTO: 46 % (ref 43–75)
NONHDLC SERPL-MCNC: 179 MG/DL
NRBC BLD AUTO-RTO: 0 /100 WBCS
PLATELET # BLD AUTO: 263 THOUSANDS/UL (ref 149–390)
PMV BLD AUTO: 10.6 FL (ref 8.9–12.7)
POTASSIUM SERPL-SCNC: 4.2 MMOL/L (ref 3.5–5.3)
PROT SERPL-MCNC: 8.3 G/DL (ref 6.4–8.2)
RBC # BLD AUTO: 5.35 MILLION/UL (ref 3.88–5.62)
SODIUM SERPL-SCNC: 136 MMOL/L (ref 136–145)
TRIGL SERPL-MCNC: 336 MG/DL
WBC # BLD AUTO: 7.7 THOUSAND/UL (ref 4.31–10.16)

## 2021-11-08 PROCEDURE — 83690 ASSAY OF LIPASE: CPT

## 2021-11-08 PROCEDURE — 80053 COMPREHEN METABOLIC PANEL: CPT

## 2021-11-08 PROCEDURE — 85025 COMPLETE CBC W/AUTO DIFF WBC: CPT

## 2021-11-08 PROCEDURE — 36415 COLL VENOUS BLD VENIPUNCTURE: CPT

## 2021-11-08 PROCEDURE — 82150 ASSAY OF AMYLASE: CPT

## 2021-11-08 PROCEDURE — 80061 LIPID PANEL: CPT

## 2021-11-09 ENCOUNTER — OFFICE VISIT (OUTPATIENT)
Dept: FAMILY MEDICINE CLINIC | Facility: CLINIC | Age: 41
End: 2021-11-09
Payer: COMMERCIAL

## 2021-11-09 VITALS
WEIGHT: 207 LBS | DIASTOLIC BLOOD PRESSURE: 88 MMHG | OXYGEN SATURATION: 96 % | TEMPERATURE: 97.8 F | BODY MASS INDEX: 32.49 KG/M2 | SYSTOLIC BLOOD PRESSURE: 118 MMHG | RESPIRATION RATE: 14 BRPM | HEIGHT: 67 IN | HEART RATE: 72 BPM

## 2021-11-09 DIAGNOSIS — E78.2 MIXED HYPERLIPIDEMIA: ICD-10-CM

## 2021-11-09 DIAGNOSIS — J30.1 SEASONAL ALLERGIC RHINITIS DUE TO POLLEN: Primary | ICD-10-CM

## 2021-11-09 DIAGNOSIS — J45.909 EXTRINSIC ASTHMA WITHOUT COMPLICATION, UNSPECIFIED ASTHMA SEVERITY, UNSPECIFIED WHETHER PERSISTENT: ICD-10-CM

## 2021-11-09 DIAGNOSIS — F41.1 GAD (GENERALIZED ANXIETY DISORDER): ICD-10-CM

## 2021-11-09 PROCEDURE — 3725F SCREEN DEPRESSION PERFORMED: CPT | Performed by: FAMILY MEDICINE

## 2021-11-09 PROCEDURE — 99214 OFFICE O/P EST MOD 30 MIN: CPT | Performed by: FAMILY MEDICINE

## 2021-11-09 PROCEDURE — 3008F BODY MASS INDEX DOCD: CPT | Performed by: FAMILY MEDICINE

## 2021-11-09 RX ORDER — FENOFIBRATE 145 MG/1
145 TABLET, COATED ORAL DAILY
Qty: 90 TABLET | Refills: 1 | Status: SHIPPED | OUTPATIENT
Start: 2021-11-09 | End: 2022-05-15

## 2021-12-09 DIAGNOSIS — F41.9 ANXIETY: ICD-10-CM

## 2021-12-11 RX ORDER — CLONAZEPAM 0.5 MG/1
0.5 TABLET ORAL 2 TIMES DAILY PRN
Qty: 45 TABLET | Refills: 0 | Status: SHIPPED | OUTPATIENT
Start: 2021-12-11 | End: 2022-02-01 | Stop reason: SDUPTHER

## 2022-01-04 ENCOUNTER — SOCIAL WORK (OUTPATIENT)
Dept: BEHAVIORAL/MENTAL HEALTH CLINIC | Facility: CLINIC | Age: 42
End: 2022-01-04
Payer: COMMERCIAL

## 2022-01-04 DIAGNOSIS — F41.1 GAD (GENERALIZED ANXIETY DISORDER): Primary | ICD-10-CM

## 2022-01-04 PROCEDURE — 90834 PSYTX W PT 45 MINUTES: CPT | Performed by: SOCIAL WORKER

## 2022-01-04 NOTE — PSYCH
2:00pm-2:45pm    Assessment/Plan: f/u in three weeks     There are no diagnoses linked to this encounter  Subjective: therapist met w/pt who is a 46year old male for initial session due to symptoms of anxiety  Pt shared that he has been struggling with anxiety since his late teen years and was told that he had a chemical imbalance and has been on medication since  Pt shared that he wants to get off his prn medication but realizes that he is going to need additional support  Pt shared he has a family hx of MH  He has two brothers and two sisters who all struggle emotionally in different ways  Pt shared he is currently  and has three children  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

## 2022-01-27 ENCOUNTER — TELEMEDICINE (OUTPATIENT)
Dept: BEHAVIORAL/MENTAL HEALTH CLINIC | Facility: CLINIC | Age: 42
End: 2022-01-27
Payer: COMMERCIAL

## 2022-01-27 DIAGNOSIS — F41.9 ANXIETY: ICD-10-CM

## 2022-01-27 DIAGNOSIS — F41.1 GAD (GENERALIZED ANXIETY DISORDER): Primary | ICD-10-CM

## 2022-01-27 PROCEDURE — 90832 PSYTX W PT 30 MINUTES: CPT | Performed by: SOCIAL WORKER

## 2022-01-28 NOTE — PSYCH
2:00pm-2:35pm    Virtual Regular Visit  Therapist met w/pt for individual session  Pt shared that he continues to struggle w/anxiety but since his wife has been home things have been a little bit better  Therapist continued to explore situations that have heightened his anxiety and then discussed coping strategies to work on implementing  Therapist educated pt on grounding techniques and how they can be helpful to bring him tot he hear and now when he is projecting os much  Therapist also educated pt on some CBT strategies to help challenge any anxious thoughts that arise to prevent panic attacks  Verification of patient location:    Patient is located in the following state in which I hold an active license [place]      Assessment/Plan: f/u in a month    Problem List Items Addressed This Visit        Other    GUCCI (generalized anxiety disorder) - Primary    Anxiety                   Reason for visit is No chief complaint on file  Encounter provider Anabela Almeida    Provider [facility] [address]  [address]  [place] [address]      Recent Visits  No visits were found meeting these conditions  Showing recent visits within past 7 days and meeting all other requirements  Future Appointments  No visits were found meeting these conditions  Showing future appointments within next 150 days and meeting all other requirements       The patient was identified by name and date of birth  Richardson Kodykathya was informed that this is a telemedicine visit and that the visit is being conducted through [employer] and patient was informed that this is not a secure, HIPAA-compliant platform  He agrees to proceed     My office door was closed  No one else was in the room  He acknowledged consent and understanding of privacy and security of the video platform   The patient has agreed to participate and understands they can discontinue the visit at any time  Patient is aware this is a billable service  Subjective  Jessica Bryan is a 46 y o  male    HPI 30 minutes    Past Medical History:   Diagnosis Date    Anxiety     Hemorrhoids        Past Surgical History:   Procedure Laterality Date    HERNIA REPAIR         Current Outpatient Medications   Medication Sig Dispense Refill    albuterol (PROVENTIL HFA,VENTOLIN HFA) 90 mcg/act inhaler Inhale 2 puffs every 6 (six) hours as needed for wheezing or shortness of breath 1 Inhaler 0    albuterol (Ventolin HFA) 90 mcg/act inhaler Inhale 2 puffs every 6 (six) hours as needed for wheezing 18 g 5    clonazePAM (KlonoPIN) 0 5 mg tablet Take 1 tablet (0 5 mg total) by mouth 2 (two) times a day as needed for anxiety or seizures 45 tablet 0    escitalopram (LEXAPRO) 20 mg tablet TAKE 1 TABLET BY MOUTH EVERY DAY 90 tablet 1    fenofibrate (TRICOR) 145 mg tablet Take 1 tablet (145 mg total) by mouth daily 90 tablet 1    predniSONE 20 mg tablet Take 1 tablet (20 mg total) by mouth 2 (two) times a day with meals 10 tablet 0     No current facility-administered medications for this visit  Allergies   Allergen Reactions    Penicillins Hives       Review of Systems    Video Exam    There were no vitals filed for this visit  Physical Exam Pt denied any SI/HI/Ah/Vh    I spent 35 minutes directly with the patient during this visit    VIRTUAL VISIT DISCLAIMER    Jessica Bryan verbally agrees to participate in [employer]  Pt is aware that [employer] could be limited without vital signs or the ability to perform a full hands-on physical Reji Curry understands he or the provider may request at any time to terminate the video visit and request the patient to seek care or treatment in person

## 2022-02-01 DIAGNOSIS — F41.9 ANXIETY: ICD-10-CM

## 2022-02-01 RX ORDER — CLONAZEPAM 0.5 MG/1
0.5 TABLET ORAL 2 TIMES DAILY PRN
Qty: 45 TABLET | Refills: 0 | Status: SHIPPED | OUTPATIENT
Start: 2022-02-01 | End: 2022-03-29 | Stop reason: SDUPTHER

## 2022-03-03 ENCOUNTER — TELEMEDICINE (OUTPATIENT)
Dept: BEHAVIORAL/MENTAL HEALTH CLINIC | Facility: CLINIC | Age: 42
End: 2022-03-03
Payer: COMMERCIAL

## 2022-03-03 DIAGNOSIS — F41.1 GAD (GENERALIZED ANXIETY DISORDER): Primary | ICD-10-CM

## 2022-03-03 PROCEDURE — 90834 PSYTX W PT 45 MINUTES: CPT | Performed by: SOCIAL WORKER

## 2022-03-03 NOTE — PSYCH
10:20am-11:15am    Virtual Regular Visit  Therapist met w/pt for idnividual session  Pt stated that he had a difficult day yesterday  He shared how his mother "went off" on him and "pretty much disowned" him  He stated that he knows his mother struggles w/MH issues as well but what she said was hutful  Therapist and pt discussed how pt managed his emotions and how difficult it was for him to manage his anxietys ymptoms  Pt shared he ended up taking his prn and was able to calm down and engage with his family  Therapist and pt discussed different techniques that he can work on implementing when his symptoms become more heightened  Pt shared that he plans on returning back to work next month and is anxious about that  Verification of patient location:    Patient is located in the following state in which I hold an active license [place]      Assessment/Plan: Therapist and pt will discuss coping skills to implementing while searching for jobs to help keep him grounded  F/u in three weeks    Problem List Items Addressed This Visit        Other    GUCCI (generalized anxiety disorder) - Primary                   Reason for visit is No chief complaint on file  Encounter provider Filippo Rudd    Provider located at [facility] [address]  [address]  [facility] [address] [place] [address] [address]      Recent Visits  No visits were found meeting these conditions  Showing recent visits within past 7 days and meeting all other requirements  Future Appointments  No visits were found meeting these conditions  Showing future appointments within next 150 days and meeting all other requirements       The patient was identified by name and date of birth   Dasie Babinski was informed that this is a telemedicine visit and that the visit is being conducted through [employer] and patient was informed that this is not a secure, HIPAA-compliant platform  He agrees to proceed     My office door was closed  No one else was in the room  He acknowledged consent and understanding of privacy and security of the video platform  The patient has agreed to participate and understands they can discontinue the visit at any time  Patient is aware this is a billable service  Subjective  Merari Edwards is a 46 y o  male    HPI 45 minutes    Past Medical History:   Diagnosis Date    Anxiety     Hemorrhoids        Past Surgical History:   Procedure Laterality Date    HERNIA REPAIR         Current Outpatient Medications   Medication Sig Dispense Refill    albuterol (PROVENTIL HFA,VENTOLIN HFA) 90 mcg/act inhaler Inhale 2 puffs every 6 (six) hours as needed for wheezing or shortness of breath 1 Inhaler 0    albuterol (Ventolin HFA) 90 mcg/act inhaler Inhale 2 puffs every 6 (six) hours as needed for wheezing 18 g 5    clonazePAM (KlonoPIN) 0 5 mg tablet Take 1 tablet (0 5 mg total) by mouth 2 (two) times a day as needed for anxiety or seizures 45 tablet 0    escitalopram (LEXAPRO) 20 mg tablet TAKE 1 TABLET BY MOUTH EVERY DAY 90 tablet 1    fenofibrate (TRICOR) 145 mg tablet Take 1 tablet (145 mg total) by mouth daily 90 tablet 1    predniSONE 20 mg tablet Take 1 tablet (20 mg total) by mouth 2 (two) times a day with meals 10 tablet 0     No current facility-administered medications for this visit  Allergies   Allergen Reactions    Penicillins Hives       Review of Systems    Video Exam    There were no vitals filed for this visit  Physical Exam Pt denied any SI/HI/Ah/VH    I spent 45 minutes directly with the patient during this visit    VIRTUAL VISIT DISCLAIMER    Merari Edwards verbally agrees to participate in [employer]   Pt is aware that [employer] could be limited without vital signs or the ability to perform a full hands-on physical Amadou Gonzalez understands he or the provider may request at any time to terminate the video visit and request the patient to seek care or treatment in person

## 2022-03-16 ENCOUNTER — OFFICE VISIT (OUTPATIENT)
Dept: FAMILY MEDICINE CLINIC | Facility: CLINIC | Age: 42
End: 2022-03-16
Payer: COMMERCIAL

## 2022-03-16 VITALS
WEIGHT: 214 LBS | HEIGHT: 67 IN | HEART RATE: 92 BPM | SYSTOLIC BLOOD PRESSURE: 122 MMHG | OXYGEN SATURATION: 98 % | DIASTOLIC BLOOD PRESSURE: 80 MMHG | BODY MASS INDEX: 33.59 KG/M2 | TEMPERATURE: 97.6 F

## 2022-03-16 DIAGNOSIS — E78.2 MIXED HYPERLIPIDEMIA: Primary | ICD-10-CM

## 2022-03-16 DIAGNOSIS — K52.9 AGE (ACUTE GASTROENTERITIS): ICD-10-CM

## 2022-03-16 DIAGNOSIS — J45.909 EXTRINSIC ASTHMA WITHOUT COMPLICATION, UNSPECIFIED ASTHMA SEVERITY, UNSPECIFIED WHETHER PERSISTENT: ICD-10-CM

## 2022-03-16 DIAGNOSIS — F41.1 GAD (GENERALIZED ANXIETY DISORDER): ICD-10-CM

## 2022-03-16 PROCEDURE — 99214 OFFICE O/P EST MOD 30 MIN: CPT | Performed by: FAMILY MEDICINE

## 2022-03-16 PROCEDURE — 3008F BODY MASS INDEX DOCD: CPT | Performed by: FAMILY MEDICINE

## 2022-03-16 PROCEDURE — 3725F SCREEN DEPRESSION PERFORMED: CPT | Performed by: FAMILY MEDICINE

## 2022-03-16 NOTE — PROGRESS NOTES
BMI Counseling: Body mass index is 33 52 kg/m²  The BMI is above normal  Nutrition recommendations include decreasing portion sizes, decreasing fast food intake, limiting drinks that contain sugar, moderation in carbohydrate intake, increasing intake of lean protein, reducing intake of saturated and trans fat and reducing intake of cholesterol  Exercise recommendations include moderate physical activity 150 minutes/week and exercising 3-5 times per week  No pharmacotherapy was ordered  Rationale for BMI follow-up plan is due to patient being overweight or obese  Assessment/Plan:     Chronic Problems:  No problem-specific Assessment & Plan notes found for this encounter  Visit Diagnosis:  Diagnoses and all orders for this visit:    Extrinsic asthma without complication, unspecified asthma severity, unspecified whether persistent          Subjective:    Patient ID: Merari Edwards is a 46 y o  male  Follow-up nausea vomiting diarrhea x 2 days  Resolving    +ill contact family    negative fever chills abdominal pain, negative  Dysuria   negative travel,    self treating noting , other than pepto bismolol  Asthma very rare , infrequent use , neg use of inhaler ,  Allergy ? Does have cats , and I am allergic  Chol , tricor taking daily , no issue with med , has yet to go for labs ? Anxiety , lexapro , clon, still has pills remaining no need for refill  Still with junito that is going well             The following portions of the patient's history were reviewed and updated as appropriate: allergies, current medications, past family history, past medical history, past social history, past surgical history and problem list     Review of Systems   Constitutional: Negative for appetite change, chills, fever and unexpected weight change     HENT: Negative for congestion, dental problem, ear pain, hearing loss, postnasal drip, rhinorrhea, sinus pressure, sinus pain, sneezing, sore throat, tinnitus and voice change  Eyes: Negative for visual disturbance  Respiratory: Negative for apnea, cough, chest tightness and shortness of breath  Cardiovascular: Negative for chest pain, palpitations and leg swelling  Gastrointestinal: Negative for abdominal pain, blood in stool, constipation, diarrhea, nausea and vomiting  Endocrine: Negative for cold intolerance, heat intolerance, polydipsia, polyphagia and polyuria  Genitourinary: Negative for decreased urine volume, difficulty urinating, dysuria, frequency and hematuria  Musculoskeletal: Negative for arthralgias, back pain, gait problem, joint swelling and myalgias  Skin: Negative for color change, rash and wound  Allergic/Immunologic: Negative for environmental allergies and food allergies  Neurological: Negative for dizziness, syncope, weakness, light-headedness, numbness and headaches  Hematological: Negative for adenopathy  Does not bruise/bleed easily  Psychiatric/Behavioral: Negative for sleep disturbance and suicidal ideas  The patient is not nervous/anxious            /80   Pulse 92   Temp 97 6 °F (36 4 °C)   Ht 5' 7" (1 702 m)   Wt 97 1 kg (214 lb)   SpO2 98%   BMI 33 52 kg/m²   Social History     Socioeconomic History    Marital status: /Civil Union     Spouse name: Not on file    Number of children: Not on file    Years of education: Not on file    Highest education level: Not on file   Occupational History    Not on file   Tobacco Use    Smoking status: Former Smoker     Packs/day: 1 00     Years: 4 00     Pack years: 4 00     Types: Cigarettes     Quit date:      Years since quittin 2    Smokeless tobacco: Current User     Types: Chew   Substance and Sexual Activity    Alcohol use: Yes     Comment: rarely    Drug use: Yes     Types: Marijuana     Comment: occasional, no illicit drug use as per allscripts    Sexual activity: Not on file   Other Topics Concern    Not on file   Social History Narrative Always uses seat belt    Daily caffeine consumption 2-3 servings a day    Employed    Lives with children    Lives with spouse             Social Determinants of Health     Financial Resource Strain: Not on file   Food Insecurity: Not on file   Transportation Needs: Not on file   Physical Activity: Not on file   Stress: Not on file   Social Connections: Not on file   Intimate Partner Violence: Not on file   Housing Stability: Not on file     Past Medical History:   Diagnosis Date    Anxiety     Hemorrhoids      Family History   Problem Relation Age of Onset    Hypertension Mother     Hyperlipidemia Mother     Heart disease Other         cardiac disease    Hypertension Other     Hyperlipidemia Other     Heart disease Other         cardiac disease    Hypertension Other     Hyperlipidemia Other      Past Surgical History:   Procedure Laterality Date    HERNIA REPAIR         Current Outpatient Medications:     albuterol (PROVENTIL HFA,VENTOLIN HFA) 90 mcg/act inhaler, Inhale 2 puffs every 6 (six) hours as needed for wheezing or shortness of breath, Disp: 1 Inhaler, Rfl: 0    albuterol (Ventolin HFA) 90 mcg/act inhaler, Inhale 2 puffs every 6 (six) hours as needed for wheezing, Disp: 18 g, Rfl: 5    clonazePAM (KlonoPIN) 0 5 mg tablet, Take 1 tablet (0 5 mg total) by mouth 2 (two) times a day as needed for anxiety or seizures, Disp: 45 tablet, Rfl: 0    escitalopram (LEXAPRO) 20 mg tablet, TAKE 1 TABLET BY MOUTH EVERY DAY, Disp: 90 tablet, Rfl: 1    fenofibrate (TRICOR) 145 mg tablet, Take 1 tablet (145 mg total) by mouth daily, Disp: 90 tablet, Rfl: 1    predniSONE 20 mg tablet, Take 1 tablet (20 mg total) by mouth 2 (two) times a day with meals, Disp: 10 tablet, Rfl: 0    Allergies   Allergen Reactions    Penicillins Hives          Lab Review   Appointment on 11/08/2021   Component Date Value    Sodium 11/08/2021 136     Potassium 11/08/2021 4 2     Chloride 11/08/2021 105     CO2 11/08/2021 26     ANION GAP 11/08/2021 5     BUN 11/08/2021 13     Creatinine 11/08/2021 1 10     Glucose, Fasting 11/08/2021 101*    Calcium 11/08/2021 9 6     AST 11/08/2021 25     ALT 11/08/2021 52     Alkaline Phosphatase 11/08/2021 77     Total Protein 11/08/2021 8 3*    Albumin 11/08/2021 4 4     Total Bilirubin 11/08/2021 0 54     eGFR 11/08/2021 84     WBC 11/08/2021 7 70     RBC 11/08/2021 5 35     Hemoglobin 11/08/2021 15 6     Hematocrit 11/08/2021 46 8     MCV 11/08/2021 88     MCH 11/08/2021 29 2     MCHC 11/08/2021 33 3     RDW 11/08/2021 11 9     MPV 11/08/2021 10 6     Platelets 61/63/7096 263     nRBC 11/08/2021 0     Neutrophils Relative 11/08/2021 46     Immat GRANS % 11/08/2021 0     Lymphocytes Relative 11/08/2021 27     Monocytes Relative 11/08/2021 8     Eosinophils Relative 11/08/2021 19*    Basophils Relative 11/08/2021 0     Neutrophils Absolute 11/08/2021 3 44     Immature Grans Absolute 11/08/2021 0 03     Lymphocytes Absolute 11/08/2021 2 07     Monocytes Absolute 11/08/2021 0 64     Eosinophils Absolute 11/08/2021 1 49*    Basophils Absolute 11/08/2021 0 03     Cholesterol 11/08/2021 213*    Triglycerides 11/08/2021 336*    HDL, Direct 11/08/2021 34*    LDL Calculated 11/08/2021 112*    Non-HDL-Chol (CHOL-HDL) 11/08/2021 179     Lipase 11/08/2021 90     Amylase 11/08/2021 29    Appointment on 09/22/2021   Component Date Value    SARS-CoV-2 09/22/2021 Negative         Imaging: No results found  Objective:     Physical Exam  Constitutional:       General: He is not in acute distress  Appearance: He is well-developed  He is not ill-appearing or toxic-appearing  HENT:      Head: Normocephalic and atraumatic  Right Ear: Tympanic membrane normal       Left Ear: Tympanic membrane normal       Mouth/Throat:      Mouth: Mucous membranes are moist       Pharynx: No posterior oropharyngeal erythema     Eyes:      General: No scleral icterus  Conjunctiva/sclera: Conjunctivae normal    Cardiovascular:      Rate and Rhythm: Normal rate and regular rhythm  Heart sounds: Normal heart sounds  Pulmonary:      Effort: Pulmonary effort is normal       Breath sounds: Normal breath sounds  Abdominal:      General: Bowel sounds are normal       Palpations: Abdomen is soft  Musculoskeletal:         General: Normal range of motion  Cervical back: Normal range of motion and neck supple  Skin:     General: Skin is warm and dry  Neurological:      Mental Status: He is alert and oriented to person, place, and time  Deep Tendon Reflexes: Reflexes are normal and symmetric  Psychiatric:         Behavior: Behavior normal          Thought Content: Thought content normal          Judgment: Judgment normal            There are no Patient Instructions on file for this visit  FEROZ Velez    Portions of the record may have been created with voice recognition software  Occasional wrong word or "sound a like" substitutions may have occurred due to the inherent limitations of voice recognition software  Read the chart carefully and recognize, using context, where substitutions have occurred

## 2022-03-22 ENCOUNTER — APPOINTMENT (OUTPATIENT)
Dept: LAB | Facility: CLINIC | Age: 42
End: 2022-03-22
Payer: COMMERCIAL

## 2022-03-22 DIAGNOSIS — E78.2 MIXED HYPERLIPIDEMIA: ICD-10-CM

## 2022-03-22 LAB
ALBUMIN SERPL BCP-MCNC: 4.2 G/DL (ref 3.5–5)
ALP SERPL-CCNC: 42 U/L (ref 46–116)
ALT SERPL W P-5'-P-CCNC: 49 U/L (ref 12–78)
ANION GAP SERPL CALCULATED.3IONS-SCNC: 5 MMOL/L (ref 4–13)
AST SERPL W P-5'-P-CCNC: 25 U/L (ref 5–45)
BILIRUB SERPL-MCNC: 0.29 MG/DL (ref 0.2–1)
BUN SERPL-MCNC: 15 MG/DL (ref 5–25)
CALCIUM SERPL-MCNC: 9.4 MG/DL (ref 8.3–10.1)
CHLORIDE SERPL-SCNC: 108 MMOL/L (ref 100–108)
CHOLEST SERPL-MCNC: 147 MG/DL
CO2 SERPL-SCNC: 27 MMOL/L (ref 21–32)
CREAT SERPL-MCNC: 1.16 MG/DL (ref 0.6–1.3)
GFR SERPL CREATININE-BSD FRML MDRD: 78 ML/MIN/1.73SQ M
GLUCOSE P FAST SERPL-MCNC: 119 MG/DL (ref 65–99)
HDLC SERPL-MCNC: 29 MG/DL
LDLC SERPL CALC-MCNC: 101 MG/DL (ref 0–100)
NONHDLC SERPL-MCNC: 118 MG/DL
POTASSIUM SERPL-SCNC: 4.2 MMOL/L (ref 3.5–5.3)
PROT SERPL-MCNC: 7.1 G/DL (ref 6.4–8.2)
SODIUM SERPL-SCNC: 140 MMOL/L (ref 136–145)
TRIGL SERPL-MCNC: 84 MG/DL

## 2022-03-22 PROCEDURE — 80053 COMPREHEN METABOLIC PANEL: CPT

## 2022-03-22 PROCEDURE — 80061 LIPID PANEL: CPT

## 2022-03-22 PROCEDURE — 36415 COLL VENOUS BLD VENIPUNCTURE: CPT

## 2022-03-24 ENCOUNTER — TELEMEDICINE (OUTPATIENT)
Dept: BEHAVIORAL/MENTAL HEALTH CLINIC | Facility: CLINIC | Age: 42
End: 2022-03-24
Payer: COMMERCIAL

## 2022-03-24 DIAGNOSIS — F41.1 GAD (GENERALIZED ANXIETY DISORDER): Primary | ICD-10-CM

## 2022-03-24 PROCEDURE — 90834 PSYTX W PT 45 MINUTES: CPT | Performed by: SOCIAL WORKER

## 2022-03-24 NOTE — PSYCH
1:05pm-1:45pm    Virtual Regular Visit  Therapist met w/pt for individual session  He stated that his anxiety has been pretty manageable and he hasn't had to take his prn as much  Discussion was held around factors contributing to this improvement  Pt shared he has been working on his resume which is now complete and he is going to start applying for jobs this week  He stated that he is hoping to go back in the next month  His wife starts work next week which is creating some anxiety for him  Therapist and pt discussed things pt can do to work on managing his anxiety around her going back to work  Verification of patient location:    Patient is located in the following state in which I hold an active license [place]      Assessment/Plan: f/u next month    Problem List Items Addressed This Visit        Other    GUCCI (generalized anxiety disorder) - Primary                   Reason for visit is No chief complaint on file  Encounter provider Kalie Garcia    Provider located at [facility] [address]  [address]  [facility] [address] [place]      Recent Visits  No visits were found meeting these conditions  Showing recent visits within past 7 days and meeting all other requirements  Future Appointments  No visits were found meeting these conditions  Showing future appointments within next 150 days and meeting all other requirements       The patient was identified by name and date of birth  Haylee Feeling was informed that this is a telemedicine visit and that the visit is being conducted [facility] and patient was informed that this is a secure, HIPAA-compliant platform  He agrees to proceed     My office door was closed  No one else was in the room  He acknowledged consent and understanding of privacy and security of the video platform   The patient has agreed to participate and understands they can discontinue the visit at any time  Patient is aware this is a billable service  Subjective  Hoa Nugent is a 46 y o  male    HPI 45 minutes    Past Medical History:   Diagnosis Date    Anxiety     Hemorrhoids        Past Surgical History:   Procedure Laterality Date    HERNIA REPAIR         Current Outpatient Medications   Medication Sig Dispense Refill    albuterol (PROVENTIL HFA,VENTOLIN HFA) 90 mcg/act inhaler Inhale 2 puffs every 6 (six) hours as needed for wheezing or shortness of breath 1 Inhaler 0    albuterol (Ventolin HFA) 90 mcg/act inhaler Inhale 2 puffs every 6 (six) hours as needed for wheezing 18 g 5    clonazePAM (KlonoPIN) 0 5 mg tablet Take 1 tablet (0 5 mg total) by mouth 2 (two) times a day as needed for anxiety or seizures 45 tablet 0    escitalopram (LEXAPRO) 20 mg tablet TAKE 1 TABLET BY MOUTH EVERY DAY 90 tablet 1    fenofibrate (TRICOR) 145 mg tablet Take 1 tablet (145 mg total) by mouth daily 90 tablet 1    predniSONE 20 mg tablet Take 1 tablet (20 mg total) by mouth 2 (two) times a day with meals 10 tablet 0     No current facility-administered medications for this visit  Allergies   Allergen Reactions    Penicillins Hives       Review of Systems    Video Exam    There were no vitals filed for this visit  Physical Exam Pt denied any SI/Hi/Ah/VH    I spent 45 minutes directly with the patient during this visit    VIRTUAL VISIT DISCLAIMER    Hoa Nugent verbally agrees to participate in [employer]  Pt is aware that [employer] could be limited without vital signs or the ability to perform a full hands-on physical Robbi Messina understands he or the provider may request at any time to terminate the video visit and request the patient to seek care or treatment in person

## 2022-03-29 DIAGNOSIS — F41.9 ANXIETY: ICD-10-CM

## 2022-03-29 DIAGNOSIS — F41.1 GAD (GENERALIZED ANXIETY DISORDER): ICD-10-CM

## 2022-03-29 RX ORDER — ESCITALOPRAM OXALATE 20 MG/1
20 TABLET ORAL DAILY
Qty: 90 TABLET | Refills: 1 | Status: SHIPPED | OUTPATIENT
Start: 2022-03-29

## 2022-03-29 RX ORDER — CLONAZEPAM 0.5 MG/1
0.5 TABLET ORAL 2 TIMES DAILY PRN
Qty: 45 TABLET | Refills: 0 | Status: SHIPPED | OUTPATIENT
Start: 2022-03-29 | End: 2022-05-17 | Stop reason: SDUPTHER

## 2022-05-03 ENCOUNTER — TELEMEDICINE (OUTPATIENT)
Dept: BEHAVIORAL/MENTAL HEALTH CLINIC | Facility: CLINIC | Age: 42
End: 2022-05-03
Payer: COMMERCIAL

## 2022-05-03 DIAGNOSIS — F41.1 GAD (GENERALIZED ANXIETY DISORDER): Primary | ICD-10-CM

## 2022-05-03 PROCEDURE — 90832 PSYTX W PT 30 MINUTES: CPT | Performed by: SOCIAL WORKER

## 2022-05-04 NOTE — PSYCH
2:00pm-2:30pm    Virtual Regular Visit  Therapist met w/pt for individual session  Pt shared that his wife returned back to work so since then his anxiety has increased  Pt shared he feels more on edge, tense, racing thoughts  He stated that the morning is the most difficult because of "being pulled in so many different directions with his kids  Pt shared that he is still working on getting a job but there is a lot of fear that is holding him back  Therapist and pt discussed ways to work on challenging his anxiety as well as self soothing when needed  Verification of patient location:    Patient is located in the following state in which I hold an active license [place]      Assessment/Plan: f/u in 3 weeks    Problem List Items Addressed This Visit        Other    GUCCI (generalized anxiety disorder) - Primary                   Reason for visit is No chief complaint on file  Encounter provider Callie Sanchez    Provider located at [facility] [address]  [address]  [facility] [address] [place]      Recent Visits  No visits were found meeting these conditions  Showing recent visits within past 7 days and meeting all other requirements  Future Appointments  No visits were found meeting these conditions  Showing future appointments within next 150 days and meeting all other requirements       The patient was identified by name and date of birth  Emilie Varela was informed that this is a telemedicine visit and that the [facility] and patient was informed that this is a secure, HIPAA-compliant platform  He agrees to proceed     My office door was closed  No one else was in the room  He acknowledged consent and understanding of privacy and security of the video platform  The patient has agreed to participate and understands they can discontinue the visit at any time      Patient is aware this is a billable service  Subjective  Sondra Webber is a 46 y o  male    HPI 30 minutes    Past Medical History:   Diagnosis Date    Anxiety     Hemorrhoids        Past Surgical History:   Procedure Laterality Date    HERNIA REPAIR         Current Outpatient Medications   Medication Sig Dispense Refill    albuterol (PROVENTIL HFA,VENTOLIN HFA) 90 mcg/act inhaler Inhale 2 puffs every 6 (six) hours as needed for wheezing or shortness of breath 1 Inhaler 0    albuterol (Ventolin HFA) 90 mcg/act inhaler Inhale 2 puffs every 6 (six) hours as needed for wheezing 18 g 5    clonazePAM (KlonoPIN) 0 5 mg tablet Take 1 tablet (0 5 mg total) by mouth 2 (two) times a day as needed for anxiety or seizures 45 tablet 0    escitalopram (LEXAPRO) 20 mg tablet Take 1 tablet (20 mg total) by mouth daily 90 tablet 1    fenofibrate (TRICOR) 145 mg tablet Take 1 tablet (145 mg total) by mouth daily 90 tablet 1    predniSONE 20 mg tablet Take 1 tablet (20 mg total) by mouth 2 (two) times a day with meals 10 tablet 0     No current facility-administered medications for this visit  Allergies   Allergen Reactions    Penicillins Hives       Review of Systems    Video Exam    There were no vitals filed for this visit  Physical Exam Pt denied any Si/HI/Ah/VH    I spent 30 minutes directly with the patient during this visit    VIRTUAL VISIT DISCLAIMER    Sondra Webber verbally agrees to participate in [employer]  Pt is aware that [employer] could be limited without vital signs or the ability to perform a full hands-on physical Gunner Herrera understands he or the provider may request at any time to terminate the video visit and request the patient to seek care or treatment in person

## 2022-05-15 DIAGNOSIS — E78.2 MIXED HYPERLIPIDEMIA: ICD-10-CM

## 2022-05-15 RX ORDER — FENOFIBRATE 145 MG/1
TABLET, COATED ORAL
Qty: 90 TABLET | Refills: 1 | Status: SHIPPED | OUTPATIENT
Start: 2022-05-15

## 2022-05-17 DIAGNOSIS — F41.9 ANXIETY: ICD-10-CM

## 2022-05-17 RX ORDER — CLONAZEPAM 0.5 MG/1
0.5 TABLET ORAL 2 TIMES DAILY PRN
Qty: 45 TABLET | Refills: 0 | Status: SHIPPED | OUTPATIENT
Start: 2022-05-17 | End: 2022-06-28 | Stop reason: SDUPTHER

## 2022-05-26 ENCOUNTER — TELEMEDICINE (OUTPATIENT)
Dept: BEHAVIORAL/MENTAL HEALTH CLINIC | Facility: CLINIC | Age: 42
End: 2022-05-26
Payer: COMMERCIAL

## 2022-05-26 DIAGNOSIS — F41.1 GAD (GENERALIZED ANXIETY DISORDER): Primary | ICD-10-CM

## 2022-05-26 PROCEDURE — 90834 PSYTX W PT 45 MINUTES: CPT | Performed by: SOCIAL WORKER

## 2022-05-27 NOTE — PSYCH
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 and that he realized when he is outside of the home and "working" it seems to be a little better  Verification of patient location:    Patient is located in the following state in which I hold an active license [place]      Assessment/Plan: f/u in three weeks    Problem List Items Addressed This Visit    None                Reason for visit is No chief complaint on file  Encounter provider Dioni Valdez    Provider located at [facility] [address]  [address]  [facility] [address] [place]      Recent Visits  No visits were found meeting these conditions  Showing recent visits within past 7 days and meeting all other requirements  Future Appointments  No visits were found meeting these conditions  Showing future appointments within next 150 days and meeting all other requirements       The patient was identified by name and date of birth  Ciera Castellanos was informed that this is a telemedicine visit and that the visit is being conducted through[employer] [facility] and patient was informed that this is a secure, HIPAA-compliant platform  He agrees to proceed     My office door was closed  No one else was in the room  He acknowledged consent and understanding of privacy and security of the video platform  The patient has agreed to participate and understands they can discontinue the visit at any time      Patient is aware this is a billable service  Subjective  Anthony Sorto is a 46 y o  male   HPI 45 minutes    Past Medical History:   Diagnosis Date    Anxiety     Hemorrhoids        Past Surgical History:   Procedure Laterality Date    HERNIA REPAIR         Current Outpatient Medications   Medication Sig Dispense Refill    albuterol (PROVENTIL HFA,VENTOLIN HFA) 90 mcg/act inhaler Inhale 2 puffs every 6 (six) hours as needed for wheezing or shortness of breath 1 Inhaler 0    albuterol (Ventolin HFA) 90 mcg/act inhaler Inhale 2 puffs every 6 (six) hours as needed for wheezing 18 g 5    clonazePAM (KlonoPIN) 0 5 mg tablet Take 1 tablet (0 5 mg total) by mouth as needed in the morning and 1 tablet (0 5 mg total) as needed in the evening for anxiety or seizures  45 tablet 0    escitalopram (LEXAPRO) 20 mg tablet Take 1 tablet (20 mg total) by mouth daily 90 tablet 1    fenofibrate (TRICOR) 145 mg tablet TAKE 1 TABLET BY MOUTH EVERY DAY 90 tablet 1    predniSONE 20 mg tablet Take 1 tablet (20 mg total) by mouth 2 (two) times a day with meals 10 tablet 0     No current facility-administered medications for this visit  Allergies   Allergen Reactions    Penicillins Hives       Review of Systems    Video Exam    There were no vitals filed for this visit  Physical Exam  Pt denied any SI/HI/Ah/VH    I spent 45 minutes directly with the patient during this visit    VIRTUAL VISIT DISCLAIMER    Anthony Sorto verbally agrees to participate in [employer]  Pt is aware that [employer] could be limited without vital signs or the ability to perform a full hands-on physical Maurizio Jaquez understands he or the provider may request at any time to terminate the video visit and request the patient to seek care or treatment in person

## 2022-06-16 ENCOUNTER — TELEMEDICINE (OUTPATIENT)
Dept: BEHAVIORAL/MENTAL HEALTH CLINIC | Facility: CLINIC | Age: 42
End: 2022-06-16
Payer: COMMERCIAL

## 2022-06-16 DIAGNOSIS — F41.9 ANXIETY: Primary | ICD-10-CM

## 2022-06-16 PROCEDURE — 90832 PSYTX W PT 30 MINUTES: CPT | Performed by: SOCIAL WORKER

## 2022-06-17 NOTE — PSYCH
2:30pm-300pm    Virtual Regular Visit  Therapist met w/pt for individual session  Pt denied having any panic attacks and stated that he continues to pay attention to his warning signs and take medication when needed  Pt stated that there are a few behavioral strategies that he implements as well but medication seems to be most effective  Discussion was held around progress pt has made and how he is happy that he doesn't have to utilize the medication as much as he has had to in the past   Pt stated that he continues to adjust to being with his kids 24/7 and is hoping to get back to work this year  Discussion was held around pt knowing when he needs to take some time for himself to "regroup" when he becomes more stressed  Verification of patient location:    Patient is located in the following state in which I hold an active license [place]      Assessment/Plan: f/u in a month    Problem List Items Addressed This Visit        Other    Anxiety - Primary                 Reason for visit is No chief complaint on file  Encounter provider Kris Massey    Provider located at [facility] [address]  [address]  [facility] [address] [place]      Recent Visits  No visits were found meeting these conditions  Showing recent visits within past 7 days and meeting all other requirements  Future Appointments  No visits were found meeting these conditions  Showing future appointments within next 150 days and meeting all other requirements       The patient was identified by name and date of birth  Shay Dee was informed that this is a telemedicine visit and that the visit is being conducted through[facility] and patient was informed that this is a secure, HIPAA-compliant platform  He agrees to proceed     My office door was closed  No one else was in the room    He acknowledged consent and understanding of privacy and security of the video platform  The patient has agreed to participate and understands they can discontinue the visit at any time  Patient is aware this is a billable service  Subjective  Ed Lennie is a 46 y o  male    HPI 30 minutes    Past Medical History:   Diagnosis Date    Anxiety     Hemorrhoids        Past Surgical History:   Procedure Laterality Date    HERNIA REPAIR         Current Outpatient Medications   Medication Sig Dispense Refill    albuterol (PROVENTIL HFA,VENTOLIN HFA) 90 mcg/act inhaler Inhale 2 puffs every 6 (six) hours as needed for wheezing or shortness of breath 1 Inhaler 0    albuterol (Ventolin HFA) 90 mcg/act inhaler Inhale 2 puffs every 6 (six) hours as needed for wheezing 18 g 5    clonazePAM (KlonoPIN) 0 5 mg tablet Take 1 tablet (0 5 mg total) by mouth as needed in the morning and 1 tablet (0 5 mg total) as needed in the evening for anxiety or seizures  45 tablet 0    escitalopram (LEXAPRO) 20 mg tablet Take 1 tablet (20 mg total) by mouth daily 90 tablet 1    fenofibrate (TRICOR) 145 mg tablet TAKE 1 TABLET BY MOUTH EVERY DAY 90 tablet 1    predniSONE 20 mg tablet Take 1 tablet (20 mg total) by mouth 2 (two) times a day with meals 10 tablet 0     No current facility-administered medications for this visit  Allergies   Allergen Reactions    Penicillins Hives       Review of Systems    Video Exam    There were no vitals filed for this visit  Physical Exam  Pt denied any SI/HI/Ah/Vh    I spent 30 minutes directly with the patient during this visit    VIRTUAL VISIT DISCLAIMER    Ed Lennie verbally agrees to participate in [employer]  Pt is aware that [employer] could be limited without vital signs or the ability to perform a full hands-on physical Laura Garrison understands he or the provider may request at any time to terminate the video visit and request the patient to seek care or treatment in person

## 2022-06-28 DIAGNOSIS — F41.9 ANXIETY: ICD-10-CM

## 2022-06-30 RX ORDER — CLONAZEPAM 0.5 MG/1
0.5 TABLET ORAL 2 TIMES DAILY PRN
Qty: 45 TABLET | Refills: 0 | Status: SHIPPED | OUTPATIENT
Start: 2022-06-30

## 2022-08-15 DIAGNOSIS — F41.9 ANXIETY: ICD-10-CM

## 2022-08-15 RX ORDER — CLONAZEPAM 0.5 MG/1
0.5 TABLET ORAL 2 TIMES DAILY PRN
Qty: 45 TABLET | Refills: 0 | Status: SHIPPED | OUTPATIENT
Start: 2022-08-15 | End: 2022-09-15 | Stop reason: SDUPTHER

## 2022-09-04 DIAGNOSIS — J45.909 EXTRINSIC ASTHMA WITHOUT COMPLICATION, UNSPECIFIED ASTHMA SEVERITY, UNSPECIFIED WHETHER PERSISTENT: ICD-10-CM

## 2022-09-06 RX ORDER — ALBUTEROL SULFATE 90 UG/1
AEROSOL, METERED RESPIRATORY (INHALATION)
Qty: 8 G | Refills: 3 | Status: SHIPPED | OUTPATIENT
Start: 2022-09-06

## 2022-09-08 ENCOUNTER — OFFICE VISIT (OUTPATIENT)
Dept: FAMILY MEDICINE CLINIC | Facility: CLINIC | Age: 42
End: 2022-09-08
Payer: COMMERCIAL

## 2022-09-08 VITALS
DIASTOLIC BLOOD PRESSURE: 100 MMHG | RESPIRATION RATE: 12 BRPM | HEIGHT: 67 IN | BODY MASS INDEX: 32.18 KG/M2 | HEART RATE: 102 BPM | WEIGHT: 205 LBS | TEMPERATURE: 97.6 F | OXYGEN SATURATION: 97 % | SYSTOLIC BLOOD PRESSURE: 140 MMHG

## 2022-09-08 DIAGNOSIS — F41.1 GAD (GENERALIZED ANXIETY DISORDER): Primary | ICD-10-CM

## 2022-09-08 PROCEDURE — 99213 OFFICE O/P EST LOW 20 MIN: CPT | Performed by: FAMILY MEDICINE

## 2022-09-08 NOTE — PROGRESS NOTES
Tobacco Cessation Counseling: Tobacco cessation counseling was provided  The patient is sincerely urged to quit consumption of tobacco  He is not ready to quit tobacco        Assessment/Plan:     Chronic Problems:  No problem-specific Assessment & Plan notes found for this encounter  Visit Diagnosis:  Diagnoses and all orders for this visit:    GUCCI (generalized anxiety disorder)  Comments:  Discussed current issues concerns length, reviewed medications safety dosing indications, to be continued encourage continued contact with therapist, marriage c          Subjective:    Patient ID: Dasie Babinski is a 46 y o  male  Anxiety , depression , chol   lexapro , klonopin ,   Family problems , just found out that wife has been cheating on me fo ryrs   3 children I have   We are going to go for marriage counseling , has scheduled follow-up Robert Arroyo /therapist  Has felt the need to take Klonopin on a regular basis for the past few days twice a day versus previous only as needed basis  Family close by father  Willing to work through issues spouse  Cholesterol continues with medicines has been managing this quite good with diet, had expectations of recheck cholesterol      The following portions of the patient's history were reviewed and updated as appropriate: allergies, current medications, past family history, past medical history, past social history, past surgical history and problem list     Review of Systems   Psychiatric/Behavioral: Negative for suicidal ideas  The patient is nervous/anxious            /100   Pulse 102   Temp 97 6 °F (36 4 °C)   Resp 12   Ht 5' 7" (1 702 m)   Wt 93 kg (205 lb)   SpO2 97%   BMI 32 11 kg/m²   Social History     Socioeconomic History    Marital status: /Civil Union     Spouse name: Not on file    Number of children: Not on file    Years of education: Not on file    Highest education level: Not on file   Occupational History    Not on file   Tobacco Use    Smoking status: Former Smoker     Packs/day: 1 00     Years: 4 00     Pack years: 4 00     Types: Cigarettes     Quit date:      Years since quittin 6    Smokeless tobacco: Current User     Types: Chew   Substance and Sexual Activity    Alcohol use: Yes     Comment: rarely    Drug use: Yes     Types: Marijuana     Comment: occasional, no illicit drug use as per allscripts    Sexual activity: Not on file   Other Topics Concern    Not on file   Social History Narrative    Always uses seat belt    Daily caffeine consumption 2-3 servings a day    Employed    Lives with children    Lives with spouse             Social Determinants of Health     Financial Resource Strain: Not on file   Food Insecurity: Not on file   Transportation Needs: Not on file   Physical Activity: Not on file   Stress: Not on file   Social Connections: Not on file   Intimate Partner Violence: Not on file   Housing Stability: Not on file     Past Medical History:   Diagnosis Date    Anxiety     Hemorrhoids      Family History   Problem Relation Age of Onset    Hypertension Mother     Hyperlipidemia Mother     Heart disease Other         cardiac disease    Hypertension Other     Hyperlipidemia Other     Heart disease Other         cardiac disease    Hypertension Other     Hyperlipidemia Other      Past Surgical History:   Procedure Laterality Date    HERNIA REPAIR         Current Outpatient Medications:     albuterol (PROVENTIL HFA,VENTOLIN HFA) 90 mcg/act inhaler, Inhale 2 puffs every 6 (six) hours as needed for wheezing or shortness of breath, Disp: 1 Inhaler, Rfl: 0    clonazePAM (KlonoPIN) 0 5 mg tablet, Take 1 tablet (0 5 mg total) by mouth 2 (two) times a day as needed for anxiety or seizures, Disp: 45 tablet, Rfl: 0    escitalopram (LEXAPRO) 20 mg tablet, Take 1 tablet (20 mg total) by mouth daily, Disp: 90 tablet, Rfl: 1    fenofibrate (TRICOR) 145 mg tablet, TAKE 1 TABLET BY MOUTH EVERY DAY, Disp: 90 tablet, Rfl: 1    albuterol (PROVENTIL HFA,VENTOLIN HFA) 90 mcg/act inhaler, TAKE 2 PUFFS BY MOUTH EVERY 6 HOURS AS NEEDED FOR WHEEZE, Disp: 8 g, Rfl: 3    predniSONE 20 mg tablet, Take 1 tablet (20 mg total) by mouth 2 (two) times a day with meals, Disp: 10 tablet, Rfl: 0    Allergies   Allergen Reactions    Penicillins Hives          Lab Review   Appointment on 03/22/2022   Component Date Value    Sodium 03/22/2022 140     Potassium 03/22/2022 4 2     Chloride 03/22/2022 108     CO2 03/22/2022 27     ANION GAP 03/22/2022 5     BUN 03/22/2022 15     Creatinine 03/22/2022 1 16     Glucose, Fasting 03/22/2022 119 (A)    Calcium 03/22/2022 9 4     AST 03/22/2022 25     ALT 03/22/2022 49     Alkaline Phosphatase 03/22/2022 42 (A)    Total Protein 03/22/2022 7 1     Albumin 03/22/2022 4 2     Total Bilirubin 03/22/2022 0 29     eGFR 03/22/2022 78     Cholesterol 03/22/2022 147     Triglycerides 03/22/2022 84     HDL, Direct 03/22/2022 29 (A)    LDL Calculated 03/22/2022 101 (A)    Non-HDL-Chol (CHOL-HDL) 03/22/2022 118         Imaging: No results found  Objective:     Physical Exam  Constitutional:       General: He is not in acute distress  Appearance: He is not ill-appearing or toxic-appearing  HENT:      Head: Atraumatic  Pulmonary:      Effort: Pulmonary effort is normal    Psychiatric:         Attention and Perception: Attention normal          Mood and Affect: Mood normal          Behavior: Behavior normal          Thought Content: Thought content normal  Thought content does not include homicidal or suicidal ideation  Thought content does not include homicidal or suicidal plan  Cognition and Memory: Cognition and memory normal          Judgment: Judgment normal       Comments: Conversational, positive eye contact informed to self and situation           There are no Patient Instructions on file for this visit      FEROZ Velásquez    Portions of the record may have been created with voice recognition software  Occasional wrong word or "sound a like" substitutions may have occurred due to the inherent limitations of voice recognition software  Read the chart carefully and recognize, using context, where substitutions have occurred

## 2022-09-14 ENCOUNTER — TELEPHONE (OUTPATIENT)
Dept: FAMILY MEDICINE CLINIC | Facility: CLINIC | Age: 42
End: 2022-09-14

## 2022-09-14 DIAGNOSIS — E78.2 MIXED HYPERLIPIDEMIA: Primary | ICD-10-CM

## 2022-09-14 NOTE — TELEPHONE ENCOUNTER
Pt called -    Requested  labs to be put in prior his upcoming visit with you  Pt called actually from labs already, but felt comfortable to go back tomorrow  Awaiting orders  9/21/2022 7:30 AM FEROZ Caba  to Nuria Newton  I am planning on getting my blood screening this Tuesday as initially intended   I would like to get tested for STD and I'm not sure if that is something you can add to the order?      Thank you     Please advise

## 2022-09-14 NOTE — TELEPHONE ENCOUNTER
Alan,    Pt states he would like to get tested for STD as well (too be on the safe side)  If you have any questions or concerns please reach out to pt through myc       Please advise

## 2022-09-15 ENCOUNTER — TELEMEDICINE (OUTPATIENT)
Dept: BEHAVIORAL/MENTAL HEALTH CLINIC | Facility: CLINIC | Age: 42
End: 2022-09-15
Payer: COMMERCIAL

## 2022-09-15 DIAGNOSIS — Z11.3 SCREENING EXAMINATION FOR STD (SEXUALLY TRANSMITTED DISEASE): Primary | ICD-10-CM

## 2022-09-15 DIAGNOSIS — F41.9 ANXIETY: ICD-10-CM

## 2022-09-15 DIAGNOSIS — F41.1 GAD (GENERALIZED ANXIETY DISORDER): Primary | ICD-10-CM

## 2022-09-15 PROCEDURE — 90834 PSYTX W PT 45 MINUTES: CPT | Performed by: SOCIAL WORKER

## 2022-09-15 RX ORDER — CLONAZEPAM 0.5 MG/1
0.5 TABLET ORAL 2 TIMES DAILY PRN
Qty: 45 TABLET | Refills: 0 | Status: SHIPPED | OUTPATIENT
Start: 2022-09-15

## 2022-09-16 ENCOUNTER — APPOINTMENT (OUTPATIENT)
Dept: LAB | Facility: CLINIC | Age: 42
End: 2022-09-16
Payer: COMMERCIAL

## 2022-09-16 DIAGNOSIS — Z11.3 SCREENING EXAMINATION FOR STD (SEXUALLY TRANSMITTED DISEASE): ICD-10-CM

## 2022-09-16 DIAGNOSIS — E78.2 MIXED HYPERLIPIDEMIA: ICD-10-CM

## 2022-09-16 LAB
ALBUMIN SERPL BCP-MCNC: 4.7 G/DL (ref 3.5–5)
ALP SERPL-CCNC: 38 U/L (ref 46–116)
ALT SERPL W P-5'-P-CCNC: 57 U/L (ref 12–78)
ANION GAP SERPL CALCULATED.3IONS-SCNC: 7 MMOL/L (ref 4–13)
AST SERPL W P-5'-P-CCNC: 31 U/L (ref 5–45)
BILIRUB SERPL-MCNC: 0.57 MG/DL (ref 0.2–1)
BUN SERPL-MCNC: 19 MG/DL (ref 5–25)
CALCIUM SERPL-MCNC: 9.5 MG/DL (ref 8.3–10.1)
CHLORIDE SERPL-SCNC: 106 MMOL/L (ref 96–108)
CHOLEST SERPL-MCNC: 141 MG/DL
CO2 SERPL-SCNC: 24 MMOL/L (ref 21–32)
CREAT SERPL-MCNC: 1.15 MG/DL (ref 0.6–1.3)
GFR SERPL CREATININE-BSD FRML MDRD: 79 ML/MIN/1.73SQ M
GLUCOSE P FAST SERPL-MCNC: 86 MG/DL (ref 65–99)
HDLC SERPL-MCNC: 35 MG/DL
LDLC SERPL CALC-MCNC: 89 MG/DL (ref 0–100)
POTASSIUM SERPL-SCNC: 3.9 MMOL/L (ref 3.5–5.3)
PROT SERPL-MCNC: 8 G/DL (ref 6.4–8.4)
SODIUM SERPL-SCNC: 137 MMOL/L (ref 135–147)
TRIGL SERPL-MCNC: 84 MG/DL

## 2022-09-16 PROCEDURE — 87389 HIV-1 AG W/HIV-1&-2 AB AG IA: CPT

## 2022-09-16 PROCEDURE — 87491 CHLMYD TRACH DNA AMP PROBE: CPT

## 2022-09-16 PROCEDURE — 87529 HSV DNA AMP PROBE: CPT

## 2022-09-16 PROCEDURE — 80053 COMPREHEN METABOLIC PANEL: CPT

## 2022-09-16 PROCEDURE — 86592 SYPHILIS TEST NON-TREP QUAL: CPT

## 2022-09-16 PROCEDURE — 87591 N.GONORRHOEAE DNA AMP PROB: CPT

## 2022-09-16 PROCEDURE — 36415 COLL VENOUS BLD VENIPUNCTURE: CPT

## 2022-09-16 PROCEDURE — 80061 LIPID PANEL: CPT

## 2022-09-16 NOTE — PSYCH
2:00pm-2:45pm    Virtual Regular Visit  Therapist met w/pt for individual session  Pt stated that he has been struggling for the past few weeks  He stated that a lot has changed in the past few weeks  He stated that he found out some information in regards to his wife and they started marriage counseling  Pt expressed that he has been more in his head, irritable, on edge, angry, frustrated, sad  He stated that his anxiety has lessened since it all started a few weeks ago but it is still more than it was a few months ago  Therapist and pt discussed healthy ways pt can work on managing his racing thoughts  Pt stated that he has been trying to stay busy and active because that tends to help  Verification of patient location:    Patient is located in the following state in which I hold an active license [place]      Assessment/Plan: f/uin a few weeks    Problem List Items Addressed This Visit        Other    GUCCI (generalized anxiety disorder) - Primary                   Reason for visit is No chief complaint on file  Encounter provider Mayo Marvin    Provider located at [facility] [address]  [address]  [facility] [address] [place]      Recent Visits  Date Type Provider Dept   09/14/22 Telephone Estefania Merritt, [address] recent visits within past 7 days and meeting all other requirements  Future Appointments  No visits were found meeting these conditions  Showing future appointments within next 150 days and meeting all other requirements       The patient was identified by name and date of birth  Yoni Del Angel was informed that this is a telemedicine visit and that the [facility] and patient was informed that this is a secure, HIPAA-compliant platform  He agrees to proceed     My office door was closed   No one else was in the room   He acknowledged consent and understanding of privacy and security of the video platform  The patient has agreed to participate and understands they can discontinue the visit at any time  Patient is aware this is a billable service  Subjective  Herb Smith is a 38 y o  male    HPI 45 minutes    Past Medical History:   Diagnosis Date    Anxiety     Hemorrhoids        Past Surgical History:   Procedure Laterality Date    HERNIA REPAIR         Current Outpatient Medications   Medication Sig Dispense Refill    clonazePAM (KlonoPIN) 0 5 mg tablet Take 1 tablet (0 5 mg total) by mouth 2 (two) times a day as needed for anxiety or seizures 45 tablet 0    albuterol (PROVENTIL HFA,VENTOLIN HFA) 90 mcg/act inhaler Inhale 2 puffs every 6 (six) hours as needed for wheezing or shortness of breath 1 Inhaler 0    albuterol (PROVENTIL HFA,VENTOLIN HFA) 90 mcg/act inhaler TAKE 2 PUFFS BY MOUTH EVERY 6 HOURS AS NEEDED FOR WHEEZE 8 g 3    escitalopram (LEXAPRO) 20 mg tablet Take 1 tablet (20 mg total) by mouth daily 90 tablet 1    fenofibrate (TRICOR) 145 mg tablet TAKE 1 TABLET BY MOUTH EVERY DAY 90 tablet 1    predniSONE 20 mg tablet Take 1 tablet (20 mg total) by mouth 2 (two) times a day with meals 10 tablet 0     No current facility-administered medications for this visit  Allergies   Allergen Reactions    Penicillins Hives       Review of Systems    Video Exam    There were no vitals filed for this visit      Physical Exam Pt denied any SI/Hi/AH/VH    I spent 45 minutes directly with the patient during this visit

## 2022-09-17 LAB
C TRACH DNA SPEC QL NAA+PROBE: NEGATIVE
HIV 1+2 AB+HIV1 P24 AG SERPL QL IA: NORMAL
N GONORRHOEA DNA SPEC QL NAA+PROBE: NEGATIVE
RPR SER QL: NORMAL

## 2022-09-21 ENCOUNTER — OFFICE VISIT (OUTPATIENT)
Dept: FAMILY MEDICINE CLINIC | Facility: CLINIC | Age: 42
End: 2022-09-21
Payer: COMMERCIAL

## 2022-09-21 VITALS
DIASTOLIC BLOOD PRESSURE: 80 MMHG | BODY MASS INDEX: 31.2 KG/M2 | TEMPERATURE: 97 F | WEIGHT: 198.8 LBS | OXYGEN SATURATION: 98 % | HEIGHT: 67 IN | SYSTOLIC BLOOD PRESSURE: 110 MMHG | HEART RATE: 68 BPM

## 2022-09-21 DIAGNOSIS — Z63.0 MARITAL DYSFUNCTION: ICD-10-CM

## 2022-09-21 DIAGNOSIS — J45.909 EXTRINSIC ASTHMA WITHOUT COMPLICATION, UNSPECIFIED ASTHMA SEVERITY, UNSPECIFIED WHETHER PERSISTENT: ICD-10-CM

## 2022-09-21 DIAGNOSIS — F41.9 ANXIETY: Primary | ICD-10-CM

## 2022-09-21 DIAGNOSIS — E78.2 MIXED HYPERLIPIDEMIA: ICD-10-CM

## 2022-09-21 LAB
HSV1 DNA SPEC QL NAA+PROBE: NEGATIVE
HSV2 DNA SPEC QL NAA+PROBE: NEGATIVE

## 2022-09-21 PROCEDURE — 3725F SCREEN DEPRESSION PERFORMED: CPT | Performed by: FAMILY MEDICINE

## 2022-09-21 PROCEDURE — 99214 OFFICE O/P EST MOD 30 MIN: CPT | Performed by: FAMILY MEDICINE

## 2022-09-21 SDOH — SOCIAL STABILITY - SOCIAL INSECURITY: PROBLEMS IN RELATIONSHIP WITH SPOUSE OR PARTNER: Z63.0

## 2022-09-21 NOTE — PROGRESS NOTES
BMI Counseling: Body mass index is 31 14 kg/m²  The BMI is above normal  Nutrition recommendations include decreasing portion sizes, decreasing fast food intake, limiting drinks that contain sugar, moderation in carbohydrate intake, increasing intake of lean protein, reducing intake of saturated and trans fat and reducing intake of cholesterol  Exercise recommendations include moderate physical activity 150 minutes/week and exercising 3-5 times per week  No pharmacotherapy was ordered  Rationale for BMI follow-up plan is due to patient being overweight or obese  Assessment/Plan:     Chronic Problems:  No problem-specific Assessment & Plan notes found for this encounter  Visit Diagnosis:  Diagnoses and all orders for this visit:    Anxiety  Comments:   discussed issues concerns encourage continued follow-up with counselor/ therapist continue current medications reviewed    Marital dysfunction  Comments:   discussed current issues, encourage care with marriage counselor    Mixed hyperlipidemia  Comments:   stable, significant improvement lipid panel since previous continue current diet, continue  current medications    Extrinsic asthma without complication, unspecified asthma severity, unspecified whether persistent  Comments:   discussed triggers, presently asymptomatic, continue treatment of environmental OTC antihistamine          Subjective:    Patient ID: Shivam Epps is a 38 y o  male      F/u labs   Still working thru marital problems  Marriage counseling    Anxiety / depression , continues to f/u with ovidio Kwon    cholesterol continues Tricor has seen lipid profile significant changes along with stress issues has made some dietary changes   denies any issues regard to medications taken daily negative missed   asthma /environmental allergies had noted uptake in use inhaler, has recently restarted OTC antihistamine significant benefit, presently denies any shortness of breath Or wheezing      The following portions of the patient's history were reviewed and updated as appropriate: allergies, current medications, past family history, past medical history, past social history, past surgical history and problem list     Review of Systems   Constitutional: Negative for appetite change, chills, fever and unexpected weight change  HENT: Negative for congestion, dental problem, ear pain, hearing loss, postnasal drip, rhinorrhea, sinus pressure, sinus pain, sneezing, sore throat, tinnitus and voice change  Eyes: Negative for visual disturbance  Respiratory: Negative for apnea, cough, chest tightness and shortness of breath  Cardiovascular: Negative for chest pain, palpitations and leg swelling  Gastrointestinal: Negative for abdominal pain, blood in stool, constipation, diarrhea, nausea and vomiting  Endocrine: Negative for cold intolerance, heat intolerance, polydipsia, polyphagia and polyuria  Genitourinary: Negative for decreased urine volume, difficulty urinating, dysuria, frequency and hematuria  Musculoskeletal: Negative for arthralgias, back pain, gait problem, joint swelling and myalgias  Skin: Negative for color change, rash and wound  Allergic/Immunologic: Negative for environmental allergies and food allergies  Neurological: Negative for dizziness, syncope, weakness, light-headedness, numbness and headaches  Hematological: Negative for adenopathy  Does not bruise/bleed easily  Psychiatric/Behavioral: Negative for sleep disturbance and suicidal ideas  The patient is nervous/anxious            /80 (BP Location: Left arm, Patient Position: Sitting)   Pulse 68   Temp (!) 97 °F (36 1 °C) (Tympanic)   Ht 5' 7" (1 702 m)   Wt 90 2 kg (198 lb 12 8 oz)   SpO2 98%   BMI 31 14 kg/m²   Social History     Socioeconomic History    Marital status: /Civil Union     Spouse name: Not on file    Number of children: Not on file    Years of education: Not on file    Highest education level: Not on file   Occupational History    Not on file   Tobacco Use    Smoking status: Former Smoker     Packs/day: 1 00     Years: 4 00     Pack years: 4 00     Types: Cigarettes     Quit date:      Years since quittin 7    Smokeless tobacco: Current User     Types: Chew   Substance and Sexual Activity    Alcohol use: Yes     Comment: rarely    Drug use: Yes     Types: Marijuana     Comment: occasional, no illicit drug use as per allscripts    Sexual activity: Not on file   Other Topics Concern    Not on file   Social History Narrative    Always uses seat belt    Daily caffeine consumption 2-3 servings a day    Employed    Lives with children    Lives with spouse             Social Determinants of Health     Financial Resource Strain: Not on file   Food Insecurity: Not on file   Transportation Needs: Not on file   Physical Activity: Not on file   Stress: Not on file   Social Connections: Not on file   Intimate Partner Violence: Not on file   Housing Stability: Not on file     Past Medical History:   Diagnosis Date    Anxiety     Hemorrhoids      Family History   Problem Relation Age of Onset    Hypertension Mother     Hyperlipidemia Mother     Heart disease Other         cardiac disease    Hypertension Other     Hyperlipidemia Other     Heart disease Other         cardiac disease    Hypertension Other     Hyperlipidemia Other      Past Surgical History:   Procedure Laterality Date    HERNIA REPAIR         Current Outpatient Medications:     albuterol (PROVENTIL HFA,VENTOLIN HFA) 90 mcg/act inhaler, Inhale 2 puffs every 6 (six) hours as needed for wheezing or shortness of breath, Disp: 1 Inhaler, Rfl: 0    albuterol (PROVENTIL HFA,VENTOLIN HFA) 90 mcg/act inhaler, TAKE 2 PUFFS BY MOUTH EVERY 6 HOURS AS NEEDED FOR WHEEZE, Disp: 8 g, Rfl: 3    clonazePAM (KlonoPIN) 0 5 mg tablet, Take 1 tablet (0 5 mg total) by mouth 2 (two) times a day as needed for anxiety or seizures, Disp: 45 tablet, Rfl: 0    escitalopram (LEXAPRO) 20 mg tablet, Take 1 tablet (20 mg total) by mouth daily, Disp: 90 tablet, Rfl: 1    fenofibrate (TRICOR) 145 mg tablet, TAKE 1 TABLET BY MOUTH EVERY DAY, Disp: 90 tablet, Rfl: 1    predniSONE 20 mg tablet, Take 1 tablet (20 mg total) by mouth 2 (two) times a day with meals (Patient not taking: Reported on 9/21/2022), Disp: 10 tablet, Rfl: 0    Allergies   Allergen Reactions    Penicillins Hives          Lab Review   Appointment on 09/16/2022   Component Date Value    Cholesterol 09/16/2022 141     Triglycerides 09/16/2022 84     HDL, Direct 09/16/2022 35 (A)    LDL Calculated 09/16/2022 89     Sodium 09/16/2022 137     Potassium 09/16/2022 3 9     Chloride 09/16/2022 106     CO2 09/16/2022 24     ANION GAP 09/16/2022 7     BUN 09/16/2022 19     Creatinine 09/16/2022 1 15     Glucose, Fasting 09/16/2022 86     Calcium 09/16/2022 9 5     AST 09/16/2022 31     ALT 09/16/2022 57     Alkaline Phosphatase 09/16/2022 38 (A)    Total Protein 09/16/2022 8 0     Albumin 09/16/2022 4 7     Total Bilirubin 09/16/2022 0 57     eGFR 09/16/2022 79     HIV-1/HIV-2 Ab 09/16/2022 Non-Reactive     RPR 09/16/2022 Non-Reactive     N gonorrhoeae, DNA Probe 09/16/2022 Negative     Chlamydia trachomatis, D* 09/16/2022 Negative    Appointment on 03/22/2022   Component Date Value    Sodium 03/22/2022 140     Potassium 03/22/2022 4 2     Chloride 03/22/2022 108     CO2 03/22/2022 27     ANION GAP 03/22/2022 5     BUN 03/22/2022 15     Creatinine 03/22/2022 1 16     Glucose, Fasting 03/22/2022 119 (A)    Calcium 03/22/2022 9 4     AST 03/22/2022 25     ALT 03/22/2022 49     Alkaline Phosphatase 03/22/2022 42 (A)    Total Protein 03/22/2022 7 1     Albumin 03/22/2022 4 2     Total Bilirubin 03/22/2022 0 29     eGFR 03/22/2022 78     Cholesterol 03/22/2022 147     Triglycerides 03/22/2022 84     HDL, Direct 03/22/2022 29 (A)    LDL Calculated 03/22/2022 101 (A)    Non-HDL-Chol (CHOL-HDL) 03/22/2022 118         Imaging: No results found  Objective:     Physical Exam  Constitutional:       General: He is not in acute distress  Appearance: He is well-developed  He is not ill-appearing or toxic-appearing  HENT:      Head: Normocephalic and atraumatic  Neck:      Vascular: No carotid bruit  Cardiovascular:      Rate and Rhythm: Normal rate and regular rhythm  Heart sounds: Normal heart sounds  Pulmonary:      Effort: Pulmonary effort is normal       Breath sounds: Normal breath sounds  Musculoskeletal:         General: Normal range of motion  Cervical back: Normal range of motion and neck supple  Right lower leg: No edema  Left lower leg: No edema  Lymphadenopathy:      Cervical: No cervical adenopathy  Skin:     General: Skin is warm and dry  Neurological:      Mental Status: He is alert and oriented to person, place, and time  Deep Tendon Reflexes: Reflexes are normal and symmetric  Psychiatric:         Behavior: Behavior normal          Thought Content: Thought content normal          Judgment: Judgment normal            There are no Patient Instructions on file for this visit  FEROZ Warner    Portions of the record may have been created with voice recognition software  Occasional wrong word or "sound a like" substitutions may have occurred due to the inherent limitations of voice recognition software  Read the chart carefully and recognize, using context, where substitutions have occurred

## 2022-09-24 DIAGNOSIS — F41.1 GAD (GENERALIZED ANXIETY DISORDER): ICD-10-CM

## 2022-09-24 RX ORDER — ESCITALOPRAM OXALATE 20 MG/1
TABLET ORAL
Qty: 90 TABLET | Refills: 1 | Status: SHIPPED | OUTPATIENT
Start: 2022-09-24

## 2022-09-29 ENCOUNTER — TELEMEDICINE (OUTPATIENT)
Dept: BEHAVIORAL/MENTAL HEALTH CLINIC | Facility: CLINIC | Age: 42
End: 2022-09-29
Payer: COMMERCIAL

## 2022-09-29 DIAGNOSIS — F41.9 ANXIETY: ICD-10-CM

## 2022-09-29 DIAGNOSIS — F41.1 GAD (GENERALIZED ANXIETY DISORDER): Primary | ICD-10-CM

## 2022-09-29 PROCEDURE — 90834 PSYTX W PT 45 MINUTES: CPT | Performed by: SOCIAL WORKER

## 2022-09-30 NOTE — PSYCH
10:30am-11:15am    Virtual Regular Visit  Therapist met w/pt for individual session  Pt stated that he continues to work on managing his anxiety symptoms  He reported an increase in irritability and feeling on edge due to constant conflict at home  He reported several new stressors and decided that couples counseling isn't the way to go right now  Pt stated that his wife is now going for individual therapy first and then they can decide on how to move forward  Verification of patient location:    Patient is located in the following state in which I hold an active license [place]      Assessment/Plan:    Problem List Items Addressed This Visit        Other    Anxiety - Primary                 Reason for visit is No chief complaint on file  Encounter provider Kaya Calloway    Provider located at [facility] [address]  [address]  [facility] [address] [place]      Recent Visits  No visits were found meeting these conditions  Showing recent visits within past 7 days and meeting all other requirements  Future Appointments  No visits were found meeting these conditions  Showing future appointments within next 150 days and meeting all other requirements       The patient was identified by name and date of birth  Niles Lesch was informed that this is a telemedicine visit and that the visit is being conducted through[employer] and patient was informed that this is a secure, HIPAA-compliant platform  He agrees to proceed     My office door was closed  No one else was in the room  He acknowledged consent and understanding of privacy and security of the video platform  The patient has agreed to participate and understands they can discontinue the visit at any time  Patient is aware this is a billable service  Subjective  Niles Lesch is a 38 y o  male          HPI 45 minutes    Past Medical History: Diagnosis Date    Anxiety     Hemorrhoids        Past Surgical History:   Procedure Laterality Date    HERNIA REPAIR         Current Outpatient Medications   Medication Sig Dispense Refill    albuterol (PROVENTIL HFA,VENTOLIN HFA) 90 mcg/act inhaler Inhale 2 puffs every 6 (six) hours as needed for wheezing or shortness of breath 1 Inhaler 0    albuterol (PROVENTIL HFA,VENTOLIN HFA) 90 mcg/act inhaler TAKE 2 PUFFS BY MOUTH EVERY 6 HOURS AS NEEDED FOR WHEEZE 8 g 3    clonazePAM (KlonoPIN) 0 5 mg tablet Take 1 tablet (0 5 mg total) by mouth 2 (two) times a day as needed for anxiety or seizures 45 tablet 0    escitalopram (LEXAPRO) 20 mg tablet TAKE 1 TABLET BY MOUTH EVERY DAY 90 tablet 1    fenofibrate (TRICOR) 145 mg tablet TAKE 1 TABLET BY MOUTH EVERY DAY 90 tablet 1    predniSONE 20 mg tablet Take 1 tablet (20 mg total) by mouth 2 (two) times a day with meals (Patient not taking: Reported on 9/21/2022) 10 tablet 0     No current facility-administered medications for this visit  Allergies   Allergen Reactions    Penicillins Hives       Review of Systems    Video Exam    There were no vitals filed for this visit      Physical Exam Pt denied any Si/HI/Ah/VH    I spent 45 minutes directly with the patient during this visit

## 2022-10-27 ENCOUNTER — TELEMEDICINE (OUTPATIENT)
Dept: BEHAVIORAL/MENTAL HEALTH CLINIC | Facility: CLINIC | Age: 42
End: 2022-10-27
Payer: COMMERCIAL

## 2022-10-27 DIAGNOSIS — F41.1 GAD (GENERALIZED ANXIETY DISORDER): Primary | ICD-10-CM

## 2022-10-27 PROCEDURE — 90834 PSYTX W PT 45 MINUTES: CPT | Performed by: SOCIAL WORKER

## 2022-10-28 DIAGNOSIS — F41.9 ANXIETY: ICD-10-CM

## 2022-10-28 NOTE — PSYCH
Virtual Regular Visit  Therapist met w/pt for individual session  Pt stated that his goal has been to focus on himself and do what he needs to do for him  He stated he just got his old job back and is looking to go back in a few weeks  He expressed how this is good for many reasons  He stated that it has helped his self confidence and he is also hoping that it is a  Positive distraction from the conflict at home  Pt reports still feeling anxious, irritable, on edge, sadness but has been more effective with utilizing positive coping skills  Verification of patient location:    Patient is located in the following state in which I hold an active license [place]      Assessment/Plan: f/u in two weeks    Problem List Items Addressed This Visit        Other    GUCCI (generalized anxiety disorder) - Primary                 Reason for visit is No chief complaint on file  Encounter provider Krzysztof Weaver    Provider located at [facility] [address]  [address]  [facility] [address] [place]      Recent Visits  No visits were found meeting these conditions  Showing recent visits within past 7 days and meeting all other requirements  Future Appointments  No visits were found meeting these conditions  Showing future appointments within next 150 days and meeting all other requirements       The patient was identified by name and date of birth  Joseangelia Meehan was informed that this is a telemedicine visit and that the visit is being conducted throughthe [employer] platform  He agrees to proceed     My office door was closed  No one else was in the room  He acknowledged consent and understanding of privacy and security of the video platform  The patient has agreed to participate and understands they can discontinue the visit at any time  Patient is aware this is a billable service       Rufina Kahn Jeffry Hastings is a 38 y o  male    HPI 45 minutes    Past Medical History:   Diagnosis Date   • Anxiety    • Hemorrhoids        Past Surgical History:   Procedure Laterality Date   • HERNIA REPAIR         Current Outpatient Medications   Medication Sig Dispense Refill   • albuterol (PROVENTIL HFA,VENTOLIN HFA) 90 mcg/act inhaler Inhale 2 puffs every 6 (six) hours as needed for wheezing or shortness of breath 1 Inhaler 0   • albuterol (PROVENTIL HFA,VENTOLIN HFA) 90 mcg/act inhaler TAKE 2 PUFFS BY MOUTH EVERY 6 HOURS AS NEEDED FOR WHEEZE 8 g 3   • clonazePAM (KlonoPIN) 0 5 mg tablet Take 1 tablet (0 5 mg total) by mouth 2 (two) times a day as needed for anxiety or seizures 45 tablet 0   • escitalopram (LEXAPRO) 20 mg tablet TAKE 1 TABLET BY MOUTH EVERY DAY 90 tablet 1   • fenofibrate (TRICOR) 145 mg tablet TAKE 1 TABLET BY MOUTH EVERY DAY 90 tablet 1   • predniSONE 20 mg tablet Take 1 tablet (20 mg total) by mouth 2 (two) times a day with meals (Patient not taking: Reported on 9/21/2022) 10 tablet 0     No current facility-administered medications for this visit  Allergies   Allergen Reactions   • Penicillins Hives       Review of Systems    Video Exam    There were no vitals filed for this visit      Physical Exam     Visit Time    Visit Start Time: 1:00pm  Visit Stop Time: 1:45 PM  Total Visit Duration: 45 minutes

## 2022-10-31 RX ORDER — CLONAZEPAM 0.5 MG/1
0.5 TABLET ORAL 2 TIMES DAILY PRN
Qty: 45 TABLET | Refills: 0 | Status: SHIPPED | OUTPATIENT
Start: 2022-10-31

## 2022-11-10 ENCOUNTER — TELEMEDICINE (OUTPATIENT)
Dept: BEHAVIORAL/MENTAL HEALTH CLINIC | Facility: CLINIC | Age: 42
End: 2022-11-10

## 2022-11-10 DIAGNOSIS — E78.2 MIXED HYPERLIPIDEMIA: ICD-10-CM

## 2022-11-10 DIAGNOSIS — F41.1 GAD (GENERALIZED ANXIETY DISORDER): Primary | ICD-10-CM

## 2022-11-10 RX ORDER — FENOFIBRATE 145 MG/1
TABLET, COATED ORAL
Qty: 90 TABLET | Refills: 1 | Status: SHIPPED | OUTPATIENT
Start: 2022-11-10

## 2022-11-11 NOTE — PSYCH
Virtual Regular Visit  Therapist met w/pt for individual session  Pt identified still having some anxiety(irritability and racing thoughts) but has been more manageable  Pt has begun working at his old job and even on his first day in he didn't feel overwhelmingly anxious  Therapist and pt discussed how pt's overall mood has improved, he feels happier and feels as if he Is more independent  He idntified still having stress from his marriage but continuing to focus on himself and his goals  Verification of patient location:    Patient is located in the following state in which I hold an active license [place]      Assessment/Plan: f/u in a few weeks    Problem List Items Addressed This Visit        Other    GUCCI (generalized anxiety disorder) - Primary                   Reason for visit is No chief complaint on file  Encounter provider Niesha Mccullough    Provider located at [facility] [address]  [address]  [facility] [address] [place]      Recent Visits  No visits were found meeting these conditions  Showing recent visits within past 7 days and meeting all other requirements  Future Appointments  No visits were found meeting these conditions  Showing future appointments within next 150 days and meeting all other requirements       The patient was identified by name and date of birth  Russell Mcelroy was informed that this is a telemedicine visit and that the visit [facility]  He agrees to proceed     My office door was closed  No one else was in the room  He acknowledged consent and understanding of privacy and security of the video platform  The patient has agreed to participate and understands they can discontinue the visit at any time  Patient is aware this is a billable service  Subjective  Russell Mcelroy is a 38 y o  male          HPI     Past Medical History:   Diagnosis Date   • Anxiety    • Hemorrhoids        Past Surgical History:   Procedure Laterality Date   • HERNIA REPAIR         Current Outpatient Medications   Medication Sig Dispense Refill   • albuterol (PROVENTIL HFA,VENTOLIN HFA) 90 mcg/act inhaler Inhale 2 puffs every 6 (six) hours as needed for wheezing or shortness of breath 1 Inhaler 0   • albuterol (PROVENTIL HFA,VENTOLIN HFA) 90 mcg/act inhaler TAKE 2 PUFFS BY MOUTH EVERY 6 HOURS AS NEEDED FOR WHEEZE 8 g 3   • clonazePAM (KlonoPIN) 0 5 mg tablet Take 1 tablet (0 5 mg total) by mouth 2 (two) times a day as needed for anxiety or seizures 45 tablet 0   • escitalopram (LEXAPRO) 20 mg tablet TAKE 1 TABLET BY MOUTH EVERY DAY 90 tablet 1   • fenofibrate (TRICOR) 145 mg tablet TAKE 1 TABLET BY MOUTH EVERY DAY 90 tablet 1   • predniSONE 20 mg tablet Take 1 tablet (20 mg total) by mouth 2 (two) times a day with meals (Patient not taking: Reported on 9/21/2022) 10 tablet 0     No current facility-administered medications for this visit  Allergies   Allergen Reactions   • Penicillins Hives       Review of Systems    Video Exam    There were no vitals filed for this visit      Physical Exam     Visit Time    Visit Start Time: 1:04  Visit Stop Time: 1:35 PM  Total Visit Duration: 31 minutes

## 2022-12-01 ENCOUNTER — TELEMEDICINE (OUTPATIENT)
Dept: BEHAVIORAL/MENTAL HEALTH CLINIC | Facility: CLINIC | Age: 42
End: 2022-12-01

## 2022-12-01 DIAGNOSIS — F41.9 ANXIETY: Primary | ICD-10-CM

## 2022-12-01 NOTE — PSYCH
Virtual Regular Visit  Therapist met w/pt for individual session  Pt stated that he originally thought his wife was still cheating on him but when he talked to her about it she proved that what he thought was wrong  Therapist and pt discussed how he was able to manage his emotions and not act out  Pt stated he was using calming strategies to help manage his anger and anxiety levels  Pt stated overall his anxiety has been manageable  Pt shared that work hasn't been too bad  He stated he spreads his medication out and has been taking them in a preventative measure rather than once his symptoms escalate  Verification of patient location:    Patient is located in the following state in which I hold an active license [place]      Assessment/Plan: f/u in three weeks    Problem List Items Addressed This Visit        Other    Anxiety - Primary                Reason for visit is No chief complaint on file  Encounter provider Pro Stevens    Provider located at [facility] [address]  [address]  [place] [address] [place]      Recent Visits  No visits were found meeting these conditions  Showing recent visits within past 7 days and meeting all other requirements  Today's Visits  Date Type Provider Dept   12/01/22 Telemedicine [address] today's visits and meeting all other requirements  Future Appointments  No visits were found meeting these conditions  Showing future appointments within next 150 days and meeting all other requirements       The patient was identified by name and date of birth  Brigida Barba was informed that this is a telemedicine visit and that [facility]  He agrees to proceed     My office door was closed  No one else was in the room    He acknowledged consent and understanding of privacy and security of the video platform  The patient has agreed to participate and understands they can discontinue the visit at any time  Patient is aware this is a billable service  Subjective  Usama Stewart is a 38 y o  male    HPI     Past Medical History:   Diagnosis Date   • Anxiety    • Hemorrhoids        Past Surgical History:   Procedure Laterality Date   • HERNIA REPAIR         Current Outpatient Medications   Medication Sig Dispense Refill   • albuterol (PROVENTIL HFA,VENTOLIN HFA) 90 mcg/act inhaler Inhale 2 puffs every 6 (six) hours as needed for wheezing or shortness of breath 1 Inhaler 0   • albuterol (PROVENTIL HFA,VENTOLIN HFA) 90 mcg/act inhaler TAKE 2 PUFFS BY MOUTH EVERY 6 HOURS AS NEEDED FOR WHEEZE 8 g 3   • clonazePAM (KlonoPIN) 0 5 mg tablet Take 1 tablet (0 5 mg total) by mouth 2 (two) times a day as needed for anxiety or seizures 45 tablet 0   • escitalopram (LEXAPRO) 20 mg tablet TAKE 1 TABLET BY MOUTH EVERY DAY 90 tablet 1   • fenofibrate (TRICOR) 145 mg tablet TAKE 1 TABLET BY MOUTH EVERY DAY 90 tablet 1   • predniSONE 20 mg tablet Take 1 tablet (20 mg total) by mouth 2 (two) times a day with meals (Patient not taking: Reported on 9/21/2022) 10 tablet 0     No current facility-administered medications for this visit  Allergies   Allergen Reactions   • Penicillins Hives       Review of Systems    Video Exam    There were no vitals filed for this visit      Physical Exam Pt denied any SI/HI/Ah/Vh

## 2022-12-12 DIAGNOSIS — F41.9 ANXIETY: ICD-10-CM

## 2022-12-12 RX ORDER — CLONAZEPAM 0.5 MG/1
0.5 TABLET ORAL 2 TIMES DAILY PRN
Qty: 45 TABLET | Refills: 0 | Status: SHIPPED | OUTPATIENT
Start: 2022-12-12

## 2022-12-21 ENCOUNTER — TELEPHONE (OUTPATIENT)
Dept: OTHER | Facility: OTHER | Age: 42
End: 2022-12-21

## 2022-12-21 NOTE — TELEPHONE ENCOUNTER
Patient is calling regarding cancelling an appointment  Date/Time: 12/21/22 @ 7:30am    Patient was rescheduled: YES [x] NO []    Patient requesting call back to reschedule: YES [] NO [x]    Pt called in stating he didn't have enough time to get his blood drawn  Rescheduled his appt to 12/27

## 2022-12-22 ENCOUNTER — TELEMEDICINE (OUTPATIENT)
Dept: BEHAVIORAL/MENTAL HEALTH CLINIC | Facility: CLINIC | Age: 42
End: 2022-12-22

## 2022-12-22 DIAGNOSIS — F41.1 GAD (GENERALIZED ANXIETY DISORDER): Primary | ICD-10-CM

## 2022-12-23 NOTE — PSYCH
Virtual Regular Visit  Therapist met w/pt for individual session  Pt stated that he continues to work on managing his anxiety and using his as needed medication sparingly  Pt stated that overall he hasn't had any big anxiety spikes  He shared that he still hasn't resolved things with his wife but is just focusing on himself and what he needs to do to help stabilize his mood  Verification of patient location:    Patient is located in the following state in which I [place] an [place] license [place]      Assessment/Plan: f/u in one month    Problem List Items Addressed This Visit        Other    GUCCI (generalized anxiety disorder) - Primary              Reason for visit is No chief complaint on file  Encounter provider Deshaun Javier    Provider located at [facility] [address]  [address]  [facility] [address] [place]      Recent Visits  Date Type Provider Dept   12/22/22 Telemedicine [address] recent visits within past 7 days and meeting all other requirements  Future Appointments  No visits were found meeting these conditions  Showing future appointments within next 150 days and meeting all other requirements       The patient was identified by name and date of birth  Abebe Dugan was informed that this is a telemedicine visit and that the visit is being conducted throughthe [employer] platform  He agrees to proceed     My office door was closed  No one else was in the room  He acknowledged consent and understanding of privacy and security of the video platform  The patient has agreed to participate and understands they can discontinue the visit at any time  Patient is aware this is a billable service  Subjective  Abebe Dugan is a 38 y o  male          HPI     Past Medical History:   Diagnosis Date   • Anxiety    • Hemorrhoids Past Surgical History:   Procedure Laterality Date   • HERNIA REPAIR         Current Outpatient Medications   Medication Sig Dispense Refill   • albuterol (PROVENTIL HFA,VENTOLIN HFA) 90 mcg/act inhaler Inhale 2 puffs every 6 (six) hours as needed for wheezing or shortness of breath 1 Inhaler 0   • albuterol (PROVENTIL HFA,VENTOLIN HFA) 90 mcg/act inhaler TAKE 2 PUFFS BY MOUTH EVERY 6 HOURS AS NEEDED FOR WHEEZE 8 g 3   • clonazePAM (KlonoPIN) 0 5 mg tablet Take 1 tablet (0 5 mg total) by mouth 2 (two) times a day as needed for anxiety or seizures 45 tablet 0   • escitalopram (LEXAPRO) 20 mg tablet TAKE 1 TABLET BY MOUTH EVERY DAY 90 tablet 1   • fenofibrate (TRICOR) 145 mg tablet TAKE 1 TABLET BY MOUTH EVERY DAY 90 tablet 1   • predniSONE 20 mg tablet Take 1 tablet (20 mg total) by mouth 2 (two) times a day with meals (Patient not taking: Reported on 9/21/2022) 10 tablet 0     No current facility-administered medications for this visit  Allergies   Allergen Reactions   • Penicillins Hives       Review of Systems    Video Exam    There were no vitals filed for this visit      Physical Exam

## 2022-12-28 ENCOUNTER — OFFICE VISIT (OUTPATIENT)
Dept: FAMILY MEDICINE CLINIC | Facility: CLINIC | Age: 42
End: 2022-12-28

## 2022-12-28 VITALS
TEMPERATURE: 96.4 F | BODY MASS INDEX: 32.62 KG/M2 | WEIGHT: 207.8 LBS | HEIGHT: 67 IN | SYSTOLIC BLOOD PRESSURE: 120 MMHG | HEART RATE: 78 BPM | OXYGEN SATURATION: 97 % | DIASTOLIC BLOOD PRESSURE: 78 MMHG

## 2022-12-28 DIAGNOSIS — J45.909 EXTRINSIC ASTHMA WITHOUT COMPLICATION, UNSPECIFIED ASTHMA SEVERITY, UNSPECIFIED WHETHER PERSISTENT: ICD-10-CM

## 2022-12-28 DIAGNOSIS — E78.2 MIXED HYPERLIPIDEMIA: ICD-10-CM

## 2022-12-28 DIAGNOSIS — F41.1 GAD (GENERALIZED ANXIETY DISORDER): Primary | ICD-10-CM

## 2022-12-28 NOTE — PROGRESS NOTES
Assessment/Plan:     Chronic Problems:  No problem-specific Assessment & Plan notes found for this encounter  Visit Diagnosis:  Diagnoses and all orders for this visit:    GUCCI (generalized anxiety disorder)  Comments:  stAble, with some improvement, continue current medications reviewed dosing morning cautions, continue regular sessions with therapist    Mixed hyperlipidemia  Comments:  Stable, tolerating current medications continue stress dietary modifications obtain updated lipid profile scheduled    Extrinsic asthma without complication, unspecified asthma severity, unspecified whether persistent  Comments:  Table, no recent total requirements for urgent care ER visits reviewed step plan of care          Subjective:    Patient ID: Lola Poole is a 38 y o  male  F/u   Has not gone for labs ,   Lingering issue from thanksgiving , neg covid , overall improvement  Has been working normal ,   Appetite good   [employer] was a bad day ,in regard to stressors  but has tried to stay away from the meds, and deal with issue , got to me , in my head   Has returned to work , engineering , 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 , enjoy being back   Wife relationship improving will be attending marriage counselor   Anxiety , I continue to see  Jina Griffin , and that is fine ,         The following portions of the patient's history were reviewed and updated as appropriate: allergies, current medications, past family history, past medical history, past social history, past surgical history and problem list     Review of Systems   Constitutional: Negative for appetite change, chills, fever and unexpected weight change  HENT: Negative for congestion, dental problem, ear pain, hearing loss, postnasal drip, rhinorrhea, sinus pressure, sinus pain, sneezing, sore throat, tinnitus and voice change  Eyes: Negative for visual disturbance  Respiratory: Negative for apnea, cough, chest tightness and shortness of breath      Cardiovascular: Negative for chest pain, palpitations and leg swelling  Gastrointestinal: Negative for abdominal pain, blood in stool, constipation, diarrhea, nausea and vomiting  Endocrine: Negative for cold intolerance, heat intolerance, polydipsia, polyphagia and polyuria  Genitourinary: Negative for decreased urine volume, difficulty urinating, dysuria, frequency and hematuria  Musculoskeletal: Negative for arthralgias, back pain, gait problem, joint swelling and myalgias  Skin: Negative for color change, rash and wound  Allergic/Immunologic: Negative for environmental allergies and food allergies  Neurological: Negative for dizziness, syncope, weakness, light-headedness, numbness and headaches  Hematological: Negative for adenopathy  Does not bruise/bleed easily  Psychiatric/Behavioral: Negative for sleep disturbance and suicidal ideas  The patient is not nervous/anxious            /78 (BP Location: Left arm, Patient Position: Sitting, Cuff Size: Standard)   Pulse 78   Temp (!) 96 4 °F (35 8 °C) (Tympanic)   Ht 5' 7" (1 702 m)   Wt 94 3 kg (207 lb 12 8 oz)   SpO2 97%   BMI 32 55 kg/m²   Social History     Socioeconomic History   • Marital status: /Civil Union     Spouse name: Not on file   • Number of children: Not on file   • Years of education: Not on file   • Highest education level: Not on file   Occupational History   • Not on file   Tobacco Use   • Smoking status: Former     Packs/day: 1 00     Years: 4 00     Pack years: 4 00     Types: Cigarettes     Quit date:      Years since quittin 9   • Smokeless tobacco: Current     Types: Chew   Substance and Sexual Activity   • Alcohol use: Yes     Comment: rarely   • Drug use: Yes     Types: Marijuana     Comment: occasional, no illicit drug use as per allscripts   • Sexual activity: Not on file   Other Topics Concern   • Not on file   Social History Narrative    Always uses seat belt    Daily caffeine consumption 2-3 servings a day Employed    Lives with children    Lives with spouse             Social Determinants of Health     Financial Resource Strain: Not on file   Food Insecurity: Not on file   Transportation Needs: Not on file   Physical Activity: Not on file   Stress: Not on file   Social Connections: Not on file   Intimate Partner Violence: Not on file   Housing Stability: Not on file     Past Medical History:   Diagnosis Date   • Anxiety    • Hemorrhoids      Family History   Problem Relation Age of Onset   • Hypertension Mother    • Hyperlipidemia Mother    • Heart disease Other         cardiac disease   • Hypertension Other    • Hyperlipidemia Other    • Heart disease Other         cardiac disease   • Hypertension Other    • Hyperlipidemia Other      Past Surgical History:   Procedure Laterality Date   • HERNIA REPAIR         Current Outpatient Medications:   •  albuterol (PROVENTIL HFA,VENTOLIN HFA) 90 mcg/act inhaler, Inhale 2 puffs every 6 (six) hours as needed for wheezing or shortness of breath, Disp: 1 Inhaler, Rfl: 0  •  albuterol (PROVENTIL HFA,VENTOLIN HFA) 90 mcg/act inhaler, TAKE 2 PUFFS BY MOUTH EVERY 6 HOURS AS NEEDED FOR WHEEZE, Disp: 8 g, Rfl: 3  •  clonazePAM (KlonoPIN) 0 5 mg tablet, Take 1 tablet (0 5 mg total) by mouth 2 (two) times a day as needed for anxiety or seizures, Disp: 45 tablet, Rfl: 0  •  escitalopram (LEXAPRO) 20 mg tablet, TAKE 1 TABLET BY MOUTH EVERY DAY, Disp: 90 tablet, Rfl: 1  •  fenofibrate (TRICOR) 145 mg tablet, TAKE 1 TABLET BY MOUTH EVERY DAY, Disp: 90 tablet, Rfl: 1  •  predniSONE 20 mg tablet, Take 1 tablet (20 mg total) by mouth 2 (two) times a day with meals (Patient not taking: Reported on 9/21/2022), Disp: 10 tablet, Rfl: 0    Allergies   Allergen Reactions   • Penicillins Hives          Lab Review   Appointment on 09/16/2022   Component Date Value   • Cholesterol 09/16/2022 141    • Triglycerides 09/16/2022 84    • HDL, Direct 09/16/2022 35 (L)    • LDL Calculated 09/16/2022 89    • Sodium 09/16/2022 137    • Potassium 09/16/2022 3 9    • Chloride 09/16/2022 106    • CO2 09/16/2022 24    • ANION GAP 09/16/2022 7    • BUN 09/16/2022 19    • Creatinine 09/16/2022 1 15    • Glucose, Fasting 09/16/2022 86    • Calcium 09/16/2022 9 5    • AST 09/16/2022 31    • ALT 09/16/2022 57    • Alkaline Phosphatase 09/16/2022 38 (L)    • Total Protein 09/16/2022 8 0    • Albumin 09/16/2022 4 7    • Total Bilirubin 09/16/2022 0 57    • eGFR 09/16/2022 79    • HIV-1/HIV-2 Ab 09/16/2022 Non-Reactive    • RPR 09/16/2022 Non-Reactive    • HSV 1, PCR 09/16/2022 Negative    • HSV 2 , PCR 09/16/2022 Negative    • N gonorrhoeae, DNA Probe 09/16/2022 Negative    • Chlamydia trachomatis, D* 09/16/2022 Negative         Imaging: No results found  Objective:     Physical Exam  Constitutional:       General: He is not in acute distress  Appearance: He is well-developed  He is not ill-appearing or toxic-appearing  HENT:      Head: Normocephalic and atraumatic  Nose: Nose normal       Mouth/Throat:      Mouth: Mucous membranes are moist    Neck:      Vascular: No carotid bruit  Cardiovascular:      Rate and Rhythm: Normal rate and regular rhythm  Heart sounds: Normal heart sounds  Pulmonary:      Effort: Pulmonary effort is normal       Breath sounds: Normal breath sounds  Abdominal:      General: Bowel sounds are normal  There is no distension  Palpations: Abdomen is soft  There is no mass  Tenderness: There is no abdominal tenderness  There is no right CVA tenderness or left CVA tenderness  Musculoskeletal:         General: Normal range of motion  Cervical back: Normal range of motion and neck supple  Lymphadenopathy:      Cervical: No cervical adenopathy  Skin:     General: Skin is warm and dry  Neurological:      Mental Status: He is alert and oriented to person, place, and time  Deep Tendon Reflexes: Reflexes are normal and symmetric     Psychiatric: Behavior: Behavior normal          Thought Content: Thought content normal          Judgment: Judgment normal            There are no Patient Instructions on file for this visit  FEROZ Cavanaugh    Portions of the record may have been created with voice recognition software  Occasional wrong word or "sound a like" substitutions may have occurred due to the inherent limitations of voice recognition software  Read the chart carefully and recognize, using context, where substitutions have occurred

## 2023-01-24 DIAGNOSIS — F41.9 ANXIETY: ICD-10-CM

## 2023-01-25 RX ORDER — CLONAZEPAM 0.5 MG/1
0.5 TABLET ORAL 2 TIMES DAILY PRN
Qty: 45 TABLET | Refills: 0 | Status: SHIPPED | OUTPATIENT
Start: 2023-01-25

## 2023-02-09 ENCOUNTER — TELEMEDICINE (OUTPATIENT)
Dept: BEHAVIORAL/MENTAL HEALTH CLINIC | Facility: CLINIC | Age: 43
End: 2023-02-09

## 2023-02-09 DIAGNOSIS — F41.1 GAD (GENERALIZED ANXIETY DISORDER): Primary | ICD-10-CM
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
## 2023-02-24 DIAGNOSIS — J45.909 EXTRINSIC ASTHMA WITHOUT COMPLICATION, UNSPECIFIED ASTHMA SEVERITY, UNSPECIFIED WHETHER PERSISTENT: ICD-10-CM

## 2023-02-27 RX ORDER — ALBUTEROL SULFATE 90 UG/1
AEROSOL, METERED RESPIRATORY (INHALATION)
Qty: 8.5 G | Refills: 3 | Status: SHIPPED | OUTPATIENT
Start: 2023-02-27

## 2023-03-02 ENCOUNTER — TELEMEDICINE (OUTPATIENT)
Dept: BEHAVIORAL/MENTAL HEALTH CLINIC | Facility: CLINIC | Age: 43
End: 2023-03-02

## 2023-03-02 DIAGNOSIS — F41.1 GAD (GENERALIZED ANXIETY DISORDER): Primary | ICD-10-CM
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
## 2023-03-09 ENCOUNTER — TELEMEDICINE (OUTPATIENT)
Dept: BEHAVIORAL/MENTAL HEALTH CLINIC | Facility: CLINIC | Age: 43
End: 2023-03-09

## 2023-03-09 DIAGNOSIS — F41.1 GAD (GENERALIZED ANXIETY DISORDER): Primary | ICD-10-CM

## 2023-03-09 DIAGNOSIS — F41.9 ANXIETY: ICD-10-CM

## 2023-03-13 DIAGNOSIS — F41.1 GAD (GENERALIZED ANXIETY DISORDER): ICD-10-CM

## 2023-03-13 DIAGNOSIS — E78.2 MIXED HYPERLIPIDEMIA: Primary | ICD-10-CM

## 2023-03-13 RX ORDER — CLONAZEPAM 0.5 MG/1
0.5 TABLET ORAL 2 TIMES DAILY PRN
Qty: 45 TABLET | Refills: 0 | Status: SHIPPED | OUTPATIENT
Start: 2023-03-13

## 2023-03-13 NOTE — PSYCH
Virtual Regular Visit  Therapist met w/pt for individual session  He stated that he met w/his wife and her therapist last weekend  He stated that it went well and there wasn't any big "blow ups "  He stated that he hasn't been in his head about the relationship as much this week, however, today he had a "bad" panic attack  Therapist and pt discussed that he feels it came out of nowhere  Therapist continued to probe pt to determine what his day looked like to see if there were any other factors  Pt stated he took one of his prn medications which he was disappointed about  Therapist and pt discussed other coping skills he can implement when his anxiety symptoms begin to increase  Verification of patient location:    Patient is located in the following state in which I hold an active license [place]      Assessment/Plan: f/u in three weeks    Problem List Items Addressed This Visit        Other    GUCCI (generalized anxiety disorder) - Primary               Reason for visit is No chief complaint on file  Encounter provider Kathleen Flor    Provider located at [facility] [address]  [address]  [facility] [address] [place]      Recent Visits  Date Type Provider Dept   03/09/23 Telemedicine [address] recent visits within past 7 days and meeting all other requirements  Future Appointments  No visits were found meeting these conditions  Showing future appointments within next 150 days and meeting all other requirements       The patient was identified by name and date of birth  Lilia Wright was informed that this is a telemedicine visit and that the visit is being conducted throughthe Tello platform  He agrees to proceed     My office door was closed  No one else was in the room    He acknowledged consent and understanding of privacy and security of the video platform  The patient has agreed to participate and understands they can discontinue the visit at any time  Patient is aware this is a billable service  Subjective  Leticia Carvalho is a 38 y o  male    HPI     Past Medical History:   Diagnosis Date   • Anxiety    • Hemorrhoids        Past Surgical History:   Procedure Laterality Date   • HERNIA REPAIR         Current Outpatient Medications   Medication Sig Dispense Refill   • albuterol (PROVENTIL HFA,VENTOLIN HFA) 90 mcg/act inhaler Inhale 2 puffs every 6 (six) hours as needed for wheezing or shortness of breath 1 Inhaler 0   • albuterol (PROVENTIL HFA,VENTOLIN HFA) 90 mcg/act inhaler INHALE 2 PUFFS BY MOUTH EVERY 6 HOURS AS NEEDED FOR WHEEZE 8 5 g 3   • clonazePAM (KlonoPIN) 0 5 mg tablet Take 1 tablet (0 5 mg total) by mouth 2 (two) times a day as needed for anxiety or seizures 45 tablet 0   • escitalopram (LEXAPRO) 20 mg tablet TAKE 1 TABLET BY MOUTH EVERY DAY 90 tablet 1   • fenofibrate (TRICOR) 145 mg tablet TAKE 1 TABLET BY MOUTH EVERY DAY 90 tablet 1   • predniSONE 20 mg tablet Take 1 tablet (20 mg total) by mouth 2 (two) times a day with meals (Patient not taking: Reported on 9/21/2022) 10 tablet 0     No current facility-administered medications for this visit  Allergies   Allergen Reactions   • Penicillins Hives       Review of Systems    Video Exam    There were no vitals filed for this visit      Physical Exam     Visit Time    Visit Start Time: 3:45 PM  Visit Stop Time: 4:12 PM  Total Visit Duration: 27 minutes

## 2023-03-27 ENCOUNTER — TELEMEDICINE (OUTPATIENT)
Dept: BEHAVIORAL/MENTAL HEALTH CLINIC | Facility: CLINIC | Age: 43
End: 2023-03-27

## 2023-03-27 DIAGNOSIS — F41.1 GAD (GENERALIZED ANXIETY DISORDER): Primary | ICD-10-CM

## 2023-03-27 NOTE — PSYCH
Virtual Regular Visit  Therapist met w/pt for individual session  Pt stated that he feels he has been managing his anxiety well  He stated that he has noticed an increase in anxiety at work but has been able to utilize CBT strategies to manage it  Pt stated that he has seen the growth that he has made at work especially with asserting himself  Pt stated that he has realized that since working he hasn't been as physically active which is something he needs to work on because that helps his anxiety as well  Verification of patient location:    Patient is located in the following state in which I hold an active license [place]      Assessment/Plan: f/u in three weeks    Problem List Items Addressed This Visit        Other    GUCCI (generalized anxiety disorder) - Primary              Reason for visit is No chief complaint on file  Encounter provider Arthur Rocha    Provider located at [facility] [address]  [address]  [facility] [address] [place]      Recent Visits  No visits were found meeting these conditions  Showing recent visits within past 7 days and meeting all other requirements  Today's Visits  Date Type Provider Dept   03/27/23 Telemedicine [address] today's visits and meeting all other requirements  Future Appointments  No visits were found meeting these conditions  Showing future appointments within next 150 days and meeting all other requirements       The patient was identified by name and date of birth  Elenita Robertson was informed that this is a telemedicine visit and that the visit is being conducted throughthe Azooo platform  He agrees to proceed     My office door was closed  No one else was in the room  He acknowledged consent and understanding of privacy and security of the video platform   The patient has agreed to participate and understands they can discontinue the visit at any time  Patient is aware this is a billable service  Rufina Mcclain is a 38 y o  male    HPI     Past Medical History:   Diagnosis Date   • Anxiety    • Hemorrhoids        Past Surgical History:   Procedure Laterality Date   • HERNIA REPAIR         Current Outpatient Medications   Medication Sig Dispense Refill   • albuterol (PROVENTIL HFA,VENTOLIN HFA) 90 mcg/act inhaler Inhale 2 puffs every 6 (six) hours as needed for wheezing or shortness of breath 1 Inhaler 0   • albuterol (PROVENTIL HFA,VENTOLIN HFA) 90 mcg/act inhaler INHALE 2 PUFFS BY MOUTH EVERY 6 HOURS AS NEEDED FOR WHEEZE 8 5 g 3   • clonazePAM (KlonoPIN) 0 5 mg tablet Take 1 tablet (0 5 mg total) by mouth 2 (two) times a day as needed for anxiety or seizures 45 tablet 0   • escitalopram (LEXAPRO) 20 mg tablet TAKE 1 TABLET BY MOUTH EVERY DAY 90 tablet 1   • fenofibrate (TRICOR) 145 mg tablet TAKE 1 TABLET BY MOUTH EVERY DAY 90 tablet 1   • predniSONE 20 mg tablet Take 1 tablet (20 mg total) by mouth 2 (two) times a day with meals (Patient not taking: Reported on 9/21/2022) 10 tablet 0     No current facility-administered medications for this visit  Allergies   Allergen Reactions   • Penicillins Hives       Review of Systems    Video Exam    There were no vitals filed for this visit      Physical Exam     Visit Time    Visit Start Time: 1:00 PM  Visit Stop Time: 1:36 PM  Total Visit Duration: 36 minutes

## 2023-04-26 DIAGNOSIS — F41.9 ANXIETY: ICD-10-CM

## 2023-04-27 RX ORDER — CLONAZEPAM 0.5 MG/1
0.5 TABLET ORAL 2 TIMES DAILY PRN
Qty: 45 TABLET | Refills: 0 | Status: SHIPPED | OUTPATIENT
Start: 2023-04-27

## 2023-05-11 ENCOUNTER — TELEMEDICINE (OUTPATIENT)
Dept: BEHAVIORAL/MENTAL HEALTH CLINIC | Facility: CLINIC | Age: 43
End: 2023-05-11

## 2023-05-11 DIAGNOSIS — F41.1 GAD (GENERALIZED ANXIETY DISORDER): Primary | ICD-10-CM
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
## 2023-06-01 ENCOUNTER — TELEMEDICINE (OUTPATIENT)
Dept: BEHAVIORAL/MENTAL HEALTH CLINIC | Facility: CLINIC | Age: 43
End: 2023-06-01
Payer: COMMERCIAL

## 2023-06-01 DIAGNOSIS — F41.1 GAD (GENERALIZED ANXIETY DISORDER): Primary | ICD-10-CM

## 2023-06-01 PROCEDURE — 90832 PSYTX W PT 30 MINUTES: CPT | Performed by: SOCIAL WORKER

## 2023-06-04 DIAGNOSIS — F41.1 GAD (GENERALIZED ANXIETY DISORDER): ICD-10-CM

## 2023-06-04 RX ORDER — ESCITALOPRAM OXALATE 20 MG/1
TABLET ORAL
Qty: 90 TABLET | Refills: 1 | Status: SHIPPED | OUTPATIENT
Start: 2023-06-04

## 2023-06-05 NOTE — PSYCH
"  Visit start and stop times:    06/01/23  Start Time: 1300  Stop Time: 1332  Total Visit Time: 32 minutes  Virtual Regular Visit  Therapist met w/pt for individual session  Pt's symptoms include: irritability, anger, panic, anxiety  Pt stated that things continue to be the \"same\" at home  He stated he continues to feel stuck and lied to and at work he feels as if he is getting lied to as well which has triggered an increase in anger  Therapist and pt discussed different ways to help pt manage his ongoing anger, irritability  Verification of patient location:    Patient is located at Home in the following state in which I hold an active license [place]      Assessment/Plan: f/u in 2-3 weeks    Problem List Items Addressed This Visit        Other    GUCCI (generalized anxiety disorder) - Primary          Reason for visit is No chief complaint on file  Encounter provider Esvin Ball    Provider located at [facility] [address]  [address]  [facility] [address] [place]      Recent Visits  Date Type Provider Dept   06/01/23 Telemedicine [address] recent visits within past 7 days and meeting all other requirements  Future Appointments  No visits were found meeting these conditions  Showing future appointments within next 150 days and meeting all other requirements       The patient was identified by name and date of birth  Jessica Piper was informed that this is a telemedicine visit and that the visit is being conducted throughthe [employer] platform  He agrees to proceed     My office door was closed  No one else was in the room  He acknowledged consent and understanding of privacy and security of the video platform  The patient has agreed to participate and understands they can discontinue the visit at any time      Patient is aware this is a " billable service  Subjective  Isaiah File is a 38 y o  male    HPI     Past Medical History:   Diagnosis Date   • Anxiety    • Hemorrhoids        Past Surgical History:   Procedure Laterality Date   • HERNIA REPAIR         Current Outpatient Medications   Medication Sig Dispense Refill   • albuterol (PROVENTIL HFA,VENTOLIN HFA) 90 mcg/act inhaler Inhale 2 puffs every 6 (six) hours as needed for wheezing or shortness of breath 1 Inhaler 0   • albuterol (PROVENTIL HFA,VENTOLIN HFA) 90 mcg/act inhaler INHALE 2 PUFFS BY MOUTH EVERY 6 HOURS AS NEEDED FOR WHEEZE 8 5 g 3   • clonazePAM (KlonoPIN) 0 5 mg tablet Take 1 tablet (0 5 mg total) by mouth 2 (two) times a day as needed for anxiety or seizures 45 tablet 0   • escitalopram (LEXAPRO) 20 mg tablet TAKE 1 TABLET BY MOUTH EVERY DAY 90 tablet 1   • fenofibrate (TRICOR) 145 mg tablet TAKE 1 TABLET BY MOUTH EVERY DAY 90 tablet 1   • predniSONE 20 mg tablet Take 1 tablet (20 mg total) by mouth 2 (two) times a day with meals (Patient not taking: Reported on 9/21/2022) 10 tablet 0     No current facility-administered medications for this visit  Allergies   Allergen Reactions   • Penicillins Hives       Review of Systems    Video Exam    There were no vitals filed for this visit      Physical Exam Pt denied any SI/HI/AH/VH

## 2023-06-07 DIAGNOSIS — F41.9 ANXIETY: ICD-10-CM

## 2023-06-07 RX ORDER — CLONAZEPAM 0.5 MG/1
0.5 TABLET ORAL 2 TIMES DAILY PRN
Qty: 45 TABLET | Refills: 0 | Status: SHIPPED | OUTPATIENT
Start: 2023-06-07

## 2023-06-22 ENCOUNTER — TELEMEDICINE (OUTPATIENT)
Dept: BEHAVIORAL/MENTAL HEALTH CLINIC | Facility: CLINIC | Age: 43
End: 2023-06-22
Payer: COMMERCIAL

## 2023-06-22 DIAGNOSIS — F41.1 GAD (GENERALIZED ANXIETY DISORDER): Primary | ICD-10-CM

## 2023-06-22 PROCEDURE — 90832 PSYTX W PT 30 MINUTES: CPT | Performed by: SOCIAL WORKER

## 2023-06-23 NOTE — PSYCH
Visit start and stop times:    06/21/23  Start Time: 1315  Stop Time: 1348  Total Visit Time: 33 minutes  Virtual Regular Visit  Therapist met w/pt for individual session  Pt stated that he asked his wife to join but she didn't want to  He expressed anger/frustration and having a hard time letting go  Therapist and pt discussed that he feels he wont be able to move on until they talk about her affair but she refuses  pts symptoms include: irritability, feeling on edge, racing thoughts, sadness  Therapist and pt discussed ongoing strategies to help him manage his anxious symptoms including: breathing exercises and other calming strategies  Verification of patient location:    Patient is located at Home in the following state in which I [place] an [place] license [place]      Assessment/Plan: f/u in 2-4 weeks    Problem List Items Addressed This Visit        Other    GUCCI (generalized anxiety disorder) - Primary            Reason for visit is No chief complaint on file  Encounter provider David Kincaid    Provider located at [address] [facility] [address]  [address]  [facility] [address] [place] [address]      Recent Visits  Date Type Provider Dept   06/22/23 Telemedicine [address] recent visits within past 7 days and meeting all other requirements  Future Appointments  No visits were found meeting these conditions  Showing future appointments within next 150 days and meeting all other requirements       The patient was identified by name and date of birth  Sam Jackson was informed that this is a telemedicine visit and that the visit is being conducted throughthe [employer] platform  He agrees to proceed     My office door was closed  No one else was in the room  He acknowledged consent and understanding of privacy and security of the video platform   The patient has agreed to participate and understands they can discontinue the visit at any time  Patient is aware this is a billable service  Subjective  Radha Phelps is a 38 y o  male    HPI     Past Medical History:   Diagnosis Date   • Anxiety    • Hemorrhoids        Past Surgical History:   Procedure Laterality Date   • HERNIA REPAIR         Current Outpatient Medications   Medication Sig Dispense Refill   • albuterol (PROVENTIL HFA,VENTOLIN HFA) 90 mcg/act inhaler Inhale 2 puffs every 6 (six) hours as needed for wheezing or shortness of breath 1 Inhaler 0   • albuterol (PROVENTIL HFA,VENTOLIN HFA) 90 mcg/act inhaler INHALE 2 PUFFS BY MOUTH EVERY 6 HOURS AS NEEDED FOR WHEEZE 8 5 g 3   • clonazePAM (KlonoPIN) 0 5 mg tablet Take 1 tablet (0 5 mg total) by mouth 2 (two) times a day as needed for anxiety or seizures 45 tablet 0   • escitalopram (LEXAPRO) 20 mg tablet TAKE 1 TABLET BY MOUTH EVERY DAY 90 tablet 1   • fenofibrate (TRICOR) 145 mg tablet TAKE 1 TABLET BY MOUTH EVERY DAY 90 tablet 1   • predniSONE 20 mg tablet Take 1 tablet (20 mg total) by mouth 2 (two) times a day with meals (Patient not taking: Reported on 9/21/2022) 10 tablet 0     No current facility-administered medications for this visit  Allergies   Allergen Reactions   • Penicillins Hives       Review of Systems    Video Exam    There were no vitals filed for this visit      Physical Exam pt denied any SI/HI/AH/VH

## 2023-07-10 ENCOUNTER — APPOINTMENT (OUTPATIENT)
Age: 43
End: 2023-07-10
Payer: COMMERCIAL

## 2023-07-10 ENCOUNTER — OFFICE VISIT (OUTPATIENT)
Dept: FAMILY MEDICINE CLINIC | Facility: CLINIC | Age: 43
End: 2023-07-10
Payer: COMMERCIAL

## 2023-07-10 VITALS
BODY MASS INDEX: 33.27 KG/M2 | OXYGEN SATURATION: 98 % | WEIGHT: 212 LBS | HEART RATE: 62 BPM | TEMPERATURE: 98.4 F | HEIGHT: 67 IN | SYSTOLIC BLOOD PRESSURE: 124 MMHG | DIASTOLIC BLOOD PRESSURE: 78 MMHG

## 2023-07-10 DIAGNOSIS — R10.12 LEFT UPPER QUADRANT ABDOMINAL PAIN: ICD-10-CM

## 2023-07-10 DIAGNOSIS — F41.1 GAD (GENERALIZED ANXIETY DISORDER): ICD-10-CM

## 2023-07-10 DIAGNOSIS — E78.2 MIXED HYPERLIPIDEMIA: ICD-10-CM

## 2023-07-10 DIAGNOSIS — E78.2 MIXED HYPERLIPIDEMIA: Primary | ICD-10-CM

## 2023-07-10 DIAGNOSIS — F41.9 ANXIETY: ICD-10-CM

## 2023-07-10 LAB
ALBUMIN SERPL BCP-MCNC: 4.9 G/DL (ref 3.5–5)
ALP SERPL-CCNC: 35 U/L (ref 46–116)
ALT SERPL W P-5'-P-CCNC: 57 U/L (ref 12–78)
ANION GAP SERPL CALCULATED.3IONS-SCNC: 5 MMOL/L
AST SERPL W P-5'-P-CCNC: 32 U/L (ref 5–45)
BACTERIA UR QL AUTO: ABNORMAL /HPF
BASOPHILS # BLD AUTO: 0.02 THOUSANDS/ÂΜL (ref 0–0.1)
BASOPHILS NFR BLD AUTO: 0 % (ref 0–1)
BILIRUB SERPL-MCNC: 0.56 MG/DL (ref 0.2–1)
BILIRUB UR QL STRIP: NEGATIVE
BUN SERPL-MCNC: 20 MG/DL (ref 5–25)
CALCIUM SERPL-MCNC: 9.7 MG/DL (ref 8.3–10.1)
CHLORIDE SERPL-SCNC: 109 MMOL/L (ref 96–108)
CHOLEST SERPL-MCNC: 230 MG/DL
CLARITY UR: CLEAR
CO2 SERPL-SCNC: 26 MMOL/L (ref 21–32)
COLOR UR: YELLOW
CREAT SERPL-MCNC: 1.17 MG/DL (ref 0.6–1.3)
CRP SERPL QL: <3 MG/L
EOSINOPHIL # BLD AUTO: 0.29 THOUSAND/ÂΜL (ref 0–0.61)
EOSINOPHIL NFR BLD AUTO: 3 % (ref 0–6)
ERYTHROCYTE [DISTWIDTH] IN BLOOD BY AUTOMATED COUNT: 12.1 % (ref 11.6–15.1)
GFR SERPL CREATININE-BSD FRML MDRD: 77 ML/MIN/1.73SQ M
GLUCOSE P FAST SERPL-MCNC: 92 MG/DL (ref 65–99)
GLUCOSE UR STRIP-MCNC: NEGATIVE MG/DL
HCT VFR BLD AUTO: 43 % (ref 36.5–49.3)
HDLC SERPL-MCNC: 35 MG/DL
HGB BLD-MCNC: 14 G/DL (ref 12–17)
HGB UR QL STRIP.AUTO: NEGATIVE
IMM GRANULOCYTES # BLD AUTO: 0.04 THOUSAND/UL (ref 0–0.2)
IMM GRANULOCYTES NFR BLD AUTO: 1 % (ref 0–2)
KETONES UR STRIP-MCNC: NEGATIVE MG/DL
LDLC SERPL CALC-MCNC: 156 MG/DL (ref 0–100)
LEUKOCYTE ESTERASE UR QL STRIP: NEGATIVE
LYMPHOCYTES # BLD AUTO: 2.55 THOUSANDS/ÂΜL (ref 0.6–4.47)
LYMPHOCYTES NFR BLD AUTO: 29 % (ref 14–44)
MCH RBC QN AUTO: 28.5 PG (ref 26.8–34.3)
MCHC RBC AUTO-ENTMCNC: 32.6 G/DL (ref 31.4–37.4)
MCV RBC AUTO: 87 FL (ref 82–98)
MONOCYTES # BLD AUTO: 0.56 THOUSAND/ÂΜL (ref 0.17–1.22)
MONOCYTES NFR BLD AUTO: 6 % (ref 4–12)
MUCOUS THREADS UR QL AUTO: ABNORMAL
NEUTROPHILS # BLD AUTO: 5.24 THOUSANDS/ÂΜL (ref 1.85–7.62)
NEUTS SEG NFR BLD AUTO: 61 % (ref 43–75)
NITRITE UR QL STRIP: NEGATIVE
NON-SQ EPI CELLS URNS QL MICRO: ABNORMAL /HPF
NRBC BLD AUTO-RTO: 0 /100 WBCS
PH UR STRIP.AUTO: 7 [PH]
PLATELET # BLD AUTO: 346 THOUSANDS/UL (ref 149–390)
PMV BLD AUTO: 10.9 FL (ref 8.9–12.7)
POTASSIUM SERPL-SCNC: 4 MMOL/L (ref 3.5–5.3)
PROT SERPL-MCNC: 8.3 G/DL (ref 6.4–8.4)
PROT UR STRIP-MCNC: ABNORMAL MG/DL
RBC # BLD AUTO: 4.92 MILLION/UL (ref 3.88–5.62)
RBC #/AREA URNS AUTO: ABNORMAL /HPF
SODIUM SERPL-SCNC: 140 MMOL/L (ref 135–147)
SP GR UR STRIP.AUTO: 1.03 (ref 1–1.03)
TRIGL SERPL-MCNC: 196 MG/DL
TSH SERPL DL<=0.05 MIU/L-ACNC: 2.79 UIU/ML (ref 0.45–4.5)
UROBILINOGEN UR STRIP-ACNC: 2 MG/DL
WBC # BLD AUTO: 8.7 THOUSAND/UL (ref 4.31–10.16)
WBC #/AREA URNS AUTO: ABNORMAL /HPF

## 2023-07-10 PROCEDURE — 80061 LIPID PANEL: CPT

## 2023-07-10 PROCEDURE — 85025 COMPLETE CBC W/AUTO DIFF WBC: CPT

## 2023-07-10 PROCEDURE — 36415 COLL VENOUS BLD VENIPUNCTURE: CPT

## 2023-07-10 PROCEDURE — 80053 COMPREHEN METABOLIC PANEL: CPT

## 2023-07-10 PROCEDURE — 84443 ASSAY THYROID STIM HORMONE: CPT

## 2023-07-10 PROCEDURE — 81001 URINALYSIS AUTO W/SCOPE: CPT

## 2023-07-10 PROCEDURE — 99214 OFFICE O/P EST MOD 30 MIN: CPT | Performed by: FAMILY MEDICINE

## 2023-07-10 PROCEDURE — 86140 C-REACTIVE PROTEIN: CPT

## 2023-07-10 NOTE — PROGRESS NOTES
Assessment/Plan:     Chronic Problems:  No problem-specific Assessment & Plan notes found for this encounter. Visit Diagnosis:  Diagnoses and all orders for this visit:    Mixed hyperlipidemia  Comments:  Continue present medications obtain updated lipid profile  Orders:  -     Lipid Panel with Direct LDL reflex; Future    Left upper quadrant abdominal pain  Comments:  Discussed potentials with patient, reproducible most likely musculoskeletal for thoroughness we will obtain updated labs, advised to call for any changes worsen  Orders:  -     CBC and differential; Future  -     Comprehensive metabolic panel; Future  -     UA w Reflex to Microscopic w Reflex to Culture -Lab Collect; Future  -     C-reactive protein; Future    Anxiety  Comments:  Stable continue current medications therapist counseled          Subjective:    Patient ID: Robert Rubin is a 38 y.o. male. C/o left sidded abd pain ,   Awoke with issue yesterday   Appetite   Neg n/v/d/c/ neg urinary complaints , neg dysuria   Motrin did help   + reproducible with movement   Thought related to vaping with nephew   Denies sob , manan , palp   For months increased stress which feels has affected bowel habit   Busy / stressful week for work coming up     Hyperlipidemia as of yet not gone for updated labs with planning on going this morning continues with [facility]    Anxiety, has been relatively stable continues with current medications, continues with therapist    Abdominal Pain  This is a new problem. The abdominal pain does not radiate. Pertinent negatives include no anorexia, arthralgias, constipation, diarrhea, dysuria, fever, hematuria, nausea, vomiting or weight loss. The pain is aggravated by certain positions. The pain is relieved by being still. There is no history of abdominal surgery, Crohn's disease, gallstones, GERD, irritable bowel syndrome, pancreatitis or ulcerative colitis.    Fatigue  Associated symptoms include abdominal pain and fatigue. Pertinent negatives include no anorexia, arthralgias, fever, nausea or vomiting. The following portions of the patient's history were reviewed and updated as appropriate: allergies, current medications, past family history, past medical history, past social history, past surgical history and problem list.    Review of Systems   Constitutional: Positive for fatigue. Negative for fever and weight loss. Gastrointestinal: Positive for abdominal pain. Negative for anorexia, constipation, diarrhea, nausea and vomiting. Genitourinary: Negative for dysuria and hematuria. Musculoskeletal: Negative for arthralgias.          /78   Pulse 62   Temp 98.4 °F (36.9 °C)   Ht 5' 7" (1.702 m)   Wt 96.2 kg (212 lb)   SpO2 98%   BMI 33.20 kg/m²   Social History     Socioeconomic History   • Marital status: /Civil Union     Spouse name: Not on file   • Number of children: Not on file   • Years of education: Not on file   • Highest education level: Not on file   Occupational History   • Not on file   Tobacco Use   • Smoking status: Former     Packs/day: 1.00     Years: 4.00     Total pack years: 4.00     Types: Cigarettes     Quit date:      Years since quittin.5   • Smokeless tobacco: Current     Types: Chew   Substance and Sexual Activity   • Alcohol use: Yes     Comment: rarely   • Drug use: Yes     Types: Marijuana     Comment: occasional, no illicit drug use as per allscripts   • Sexual activity: Not on file   Other Topics Concern   • Not on file   Social History Narrative    Always uses seat belt    Daily caffeine consumption 2-3 servings a day    Employed    Lives with children    Lives with spouse             Social Determinants of Health     Financial Resource Strain: Not on file   Food Insecurity: Not on file   Transportation Needs: Not on file   Physical Activity: Not on file   Stress: Not on file   Social Connections: Not on file   Intimate Partner Violence: Not on file Housing Stability: Not on file     Past Medical History:   Diagnosis Date   • Anxiety    • Hemorrhoids      Family History   Problem Relation Age of Onset   • Hypertension Mother    • Hyperlipidemia Mother    • Heart disease Other         cardiac disease   • Hypertension Other    • Hyperlipidemia Other    • Heart disease Other         cardiac disease   • Hypertension Other    • Hyperlipidemia Other      Past Surgical History:   Procedure Laterality Date   • HERNIA REPAIR         Current Outpatient Medications:   •  albuterol (PROVENTIL HFA,VENTOLIN HFA) 90 mcg/act inhaler, Inhale 2 puffs every 6 (six) hours as needed for wheezing or shortness of breath, Disp: 1 Inhaler, Rfl: 0  •  albuterol (PROVENTIL HFA,VENTOLIN HFA) 90 mcg/act inhaler, INHALE 2 PUFFS BY MOUTH EVERY 6 HOURS AS NEEDED FOR WHEEZE, Disp: 8.5 g, Rfl: 3  •  clonazePAM (KlonoPIN) 0.5 mg tablet, Take 1 tablet (0.5 mg total) by mouth 2 (two) times a day as needed for anxiety or seizures, Disp: 45 tablet, Rfl: 0  •  escitalopram (LEXAPRO) 20 mg tablet, TAKE 1 TABLET BY MOUTH EVERY DAY, Disp: 90 tablet, Rfl: 1  •  fenofibrate (TRICOR) 145 mg tablet, TAKE 1 TABLET BY MOUTH EVERY DAY, Disp: 90 tablet, Rfl: 1    Allergies   Allergen Reactions   • Penicillins Hives          Lab Review   No visits with results within 6 Month(s) from this visit.    Latest known visit with results is:   Appointment on 09/16/2022   Component Date Value   • Cholesterol 09/16/2022 141    • Triglycerides 09/16/2022 84    • HDL, Direct 09/16/2022 35 (L)    • LDL Calculated 09/16/2022 89    • Sodium 09/16/2022 137    • Potassium 09/16/2022 3.9    • Chloride 09/16/2022 106    • CO2 09/16/2022 24    • ANION GAP 09/16/2022 7    • BUN 09/16/2022 19    • Creatinine 09/16/2022 1.15    • Glucose, Fasting 09/16/2022 86    • Calcium 09/16/2022 9.5    • AST 09/16/2022 31    • ALT 09/16/2022 57    • Alkaline Phosphatase 09/16/2022 38 (L)    • Total Protein 09/16/2022 8.0    • Albumin 09/16/2022 4. 7    • Total Bilirubin 09/16/2022 0.57    • eGFR 09/16/2022 79    • HIV-1/HIV-2 Ab 09/16/2022 Non-Reactive    • RPR 09/16/2022 Non-Reactive    • HSV 1, PCR 09/16/2022 Negative    • HSV 2 , PCR 09/16/2022 Negative    • N gonorrhoeae, DNA Probe 09/16/2022 Negative    • Chlamydia trachomatis, D* 09/16/2022 Negative         Imaging: No results found. Objective:     Physical Exam  Constitutional:       General: He is not in acute distress. Appearance: He is well-developed. He is not ill-appearing or toxic-appearing. HENT:      Head: Normocephalic and atraumatic. Cardiovascular:      Rate and Rhythm: Normal rate and regular rhythm. Heart sounds: Normal heart sounds. No murmur heard. Pulmonary:      Effort: Pulmonary effort is normal.      Breath sounds: Normal breath sounds. Chest:      Chest wall: No tenderness. Abdominal:      General: Abdomen is protuberant. Bowel sounds are normal. There is no distension. There are no signs of injury. Palpations: Abdomen is soft. Tenderness: There is no abdominal tenderness. There is no right CVA tenderness or left CVA tenderness. Hernia: No hernia is present. There is no hernia in the umbilical area. Musculoskeletal:         General: Normal range of motion. Cervical back: Normal range of motion and neck supple. Skin:     General: Skin is warm and dry. Findings: No rash. Neurological:      Mental Status: He is alert and oriented to person, place, and time. Deep Tendon Reflexes: Reflexes are normal and symmetric. Psychiatric:         Behavior: Behavior normal.         Thought Content: Thought content normal.         Judgment: Judgment normal.           There are no Patient Instructions on file for this visit. FEROZ Ramirez    Portions of the record may have been created with voice recognition software.   Occasional wrong word or "sound a like" substitutions may have occurred due to the inherent limitations of voice recognition software. Read the chart carefully and recognize, using context, where substitutions have occurred.

## 2023-07-12 ENCOUNTER — OFFICE VISIT (OUTPATIENT)
Dept: FAMILY MEDICINE CLINIC | Facility: CLINIC | Age: 43
End: 2023-07-12
Payer: COMMERCIAL

## 2023-07-12 VITALS
BODY MASS INDEX: 32.49 KG/M2 | WEIGHT: 207 LBS | SYSTOLIC BLOOD PRESSURE: 122 MMHG | DIASTOLIC BLOOD PRESSURE: 78 MMHG | OXYGEN SATURATION: 98 % | HEART RATE: 71 BPM | HEIGHT: 67 IN

## 2023-07-12 DIAGNOSIS — E78.2 MIXED HYPERLIPIDEMIA: Primary | ICD-10-CM

## 2023-07-12 DIAGNOSIS — F41.1 GAD (GENERALIZED ANXIETY DISORDER): ICD-10-CM

## 2023-07-12 PROCEDURE — 99213 OFFICE O/P EST LOW 20 MIN: CPT | Performed by: FAMILY MEDICINE

## 2023-07-12 NOTE — PROGRESS NOTES
Assessment/Plan:     Chronic Problems:  No problem-specific Assessment & Plan notes found for this encounter. Visit Diagnosis:  Diagnoses and all orders for this visit:    Mixed hyperlipidemia  -     Comprehensive metabolic panel; Future  -     Lipid Panel with Direct LDL reflex; Future          Subjective:    Patient ID: Belinda Merritt is a 38 y.o. male. F/u   Chol , diet has been a bit off , enjoying summer foods with the children ,   Continues with the tricor , without issue   [place], continues with junito , also lexapro , neg missed dosing ,   Work leveling out although did have stress week , last   Vaping  Some issues with gas attributed to dietary choices  Presently doing well negative fever chills nausea vomiting diarrhea constipation      The following portions of the patient's history were reviewed and updated as appropriate: allergies, current medications, past family history, past medical history, past social history, past surgical history and problem list.    Review of Systems   Constitutional: Negative for appetite change, chills, fever and unexpected weight change. HENT: Negative for congestion, dental problem, ear pain, hearing loss, postnasal drip, rhinorrhea, sinus pressure, sinus pain, sneezing, sore throat, tinnitus and voice change. Eyes: Negative for visual disturbance. Respiratory: Negative for apnea, cough, chest tightness and shortness of breath. Cardiovascular: Negative for chest pain, palpitations and leg swelling. Gastrointestinal: Negative for abdominal pain, blood in stool, constipation, diarrhea, nausea and vomiting. Endocrine: Negative for cold intolerance, heat intolerance, polydipsia, polyphagia and polyuria. Genitourinary: Negative for decreased urine volume, difficulty urinating, dysuria, frequency and hematuria. Musculoskeletal: Negative for arthralgias, back pain, gait problem, joint swelling and myalgias.    Skin: Negative for color change, rash and wound. Allergic/Immunologic: Negative for environmental allergies and food allergies. Neurological: Negative for dizziness, syncope, weakness, light-headedness, numbness and headaches. Hematological: Negative for adenopathy. Does not bruise/bleed easily. Psychiatric/Behavioral: Negative for sleep disturbance and suicidal ideas. The patient is not nervous/anxious.           /78   Pulse 71   Ht 5' 7" (1.702 m)   Wt 93.9 kg (207 lb)   SpO2 98%   BMI 32.42 kg/m²   Social History     Socioeconomic History   • Marital status: /Civil Union     Spouse name: Not on file   • Number of children: Not on file   • Years of education: Not on file   • Highest education level: Not on file   Occupational History   • Not on file   Tobacco Use   • Smoking status: Former     Packs/day: 1.00     Years: 4.00     Total pack years: 4.00     Types: Cigarettes     Quit date:      Years since quittin.5   • Smokeless tobacco: Current     Types: Chew   Substance and Sexual Activity   • Alcohol use: Yes     Comment: rarely   • Drug use: Yes     Types: Marijuana     Comment: occasional, no illicit drug use as per allscripts   • Sexual activity: Not on file   Other Topics Concern   • Not on file   Social History Narrative    Always uses seat belt    Daily caffeine consumption 2-3 servings a day    Employed    Lives with children    Lives with spouse             Social Determinants of Health     Financial Resource Strain: Not on file   Food Insecurity: Not on file   Transportation Needs: Not on file   Physical Activity: Not on file   Stress: Not on file   Social Connections: Not on file   Intimate Partner Violence: Not on file   Housing Stability: Not on file     Past Medical History:   Diagnosis Date   • Anxiety    • Hemorrhoids      Family History   Problem Relation Age of Onset   • Hypertension Mother    • Hyperlipidemia Mother    • Heart disease Other         cardiac disease   • Hypertension Other    • Hyperlipidemia Other    • Heart disease Other         cardiac disease   • Hypertension Other    • Hyperlipidemia Other      Past Surgical History:   Procedure Laterality Date   • HERNIA REPAIR         Current Outpatient Medications:   •  albuterol (PROVENTIL HFA,VENTOLIN HFA) 90 mcg/act inhaler, Inhale 2 puffs every 6 (six) hours as needed for wheezing or shortness of breath, Disp: 1 Inhaler, Rfl: 0  •  albuterol (PROVENTIL HFA,VENTOLIN HFA) 90 mcg/act inhaler, INHALE 2 PUFFS BY MOUTH EVERY 6 HOURS AS NEEDED FOR WHEEZE, Disp: 8.5 g, Rfl: 3  •  clonazePAM (KlonoPIN) 0.5 mg tablet, Take 1 tablet (0.5 mg total) by mouth 2 (two) times a day as needed for anxiety or seizures, Disp: 45 tablet, Rfl: 0  •  escitalopram (LEXAPRO) 20 mg tablet, TAKE 1 TABLET BY MOUTH EVERY DAY, Disp: 90 tablet, Rfl: 1  •  fenofibrate (TRICOR) 145 mg tablet, TAKE 1 TABLET BY MOUTH EVERY DAY, Disp: 90 tablet, Rfl: 1    Allergies   Allergen Reactions   • Penicillins Hives          Lab Review   Appointment on 07/10/2023   Component Date Value   • Sodium 07/10/2023 140    • Potassium 07/10/2023 4.0    • Chloride 07/10/2023 109 (H)    • CO2 07/10/2023 26    • ANION GAP 07/10/2023 5    • BUN 07/10/2023 20    • Creatinine 07/10/2023 1.17    • Glucose, Fasting 07/10/2023 92    • Calcium 07/10/2023 9.7    • AST 07/10/2023 32    • ALT 07/10/2023 57    • Alkaline Phosphatase 07/10/2023 35 (L)    • Total Protein 07/10/2023 8.3    • Albumin 07/10/2023 4.9    • Total Bilirubin 07/10/2023 0.56    • eGFR 07/10/2023 77    • Cholesterol 07/10/2023 230 (H)    • Triglycerides 07/10/2023 196 (H)    • HDL, Direct 07/10/2023 35 (L)    • LDL Calculated 07/10/2023 156 (H)    • TSH 3RD GENERATON 07/10/2023 2.795    • WBC 07/10/2023 8.70    • RBC 07/10/2023 4.92    • Hemoglobin 07/10/2023 14.0    • Hematocrit 07/10/2023 43.0    • MCV 07/10/2023 87    • MCH 07/10/2023 28.5    • MCHC 07/10/2023 32.6    • RDW 07/10/2023 12.1    • MPV 07/10/2023 10.9    • Platelets 07/10/2023 346    • nRBC 07/10/2023 0    • Neutrophils Relative 07/10/2023 61    • Immat GRANS % 07/10/2023 1    • Lymphocytes Relative 07/10/2023 29    • Monocytes Relative 07/10/2023 6    • Eosinophils Relative 07/10/2023 3    • Basophils Relative 07/10/2023 0    • Neutrophils Absolute 07/10/2023 5.24    • Immature Grans Absolute 07/10/2023 0.04    • Lymphocytes Absolute 07/10/2023 2.55    • Monocytes Absolute 07/10/2023 0.56    • Eosinophils Absolute 07/10/2023 0.29    • Basophils Absolute 07/10/2023 0.02    • CRP 07/10/2023 <3.0    • Color, UA 07/10/2023 Yellow    • Clarity, UA 07/10/2023 Clear    • Specific Gravity, UA 07/10/2023 1.029    • pH, UA 07/10/2023 7.0    • Leukocytes, UA 07/10/2023 Negative    • Nitrite, UA 07/10/2023 Negative    • Protein, UA 07/10/2023 30 (1+) (A)    • Glucose, UA 07/10/2023 Negative    • Ketones, UA 07/10/2023 Negative    • Urobilinogen, UA 07/10/2023 2.0 (A)    • Bilirubin, UA 07/10/2023 Negative    • Occult Blood, UA 07/10/2023 Negative    • RBC, UA 07/10/2023 1-2    • WBC, UA 07/10/2023 1-2    • Epithelial Cells 07/10/2023 Occasional    • Bacteria, UA 07/10/2023 None Seen    • MUCUS THREADS 07/10/2023 Occasional (A)         Imaging: No results found. Objective:     Physical Exam      There are no Patient Instructions on file for this visit. FEROZ Domingo    Portions of the record may have been created with voice recognition software. Occasional wrong word or "sound a like" substitutions may have occurred due to the inherent limitations of voice recognition software. Read the chart carefully and recognize, using context, where substitutions have occurred.

## 2023-07-20 DIAGNOSIS — F41.9 ANXIETY: ICD-10-CM

## 2023-07-24 RX ORDER — CLONAZEPAM 0.5 MG/1
0.5 TABLET ORAL 2 TIMES DAILY PRN
Qty: 45 TABLET | Refills: 0 | Status: SHIPPED | OUTPATIENT
Start: 2023-07-24

## 2023-07-26 DIAGNOSIS — E78.2 MIXED HYPERLIPIDEMIA: ICD-10-CM

## 2023-07-26 RX ORDER — FENOFIBRATE 145 MG/1
TABLET, COATED ORAL
Qty: 90 TABLET | Refills: 1 | Status: SHIPPED | OUTPATIENT
Start: 2023-07-26

## 2023-07-27 ENCOUNTER — TELEMEDICINE (OUTPATIENT)
Dept: BEHAVIORAL/MENTAL HEALTH CLINIC | Facility: CLINIC | Age: 43
End: 2023-07-27
Payer: COMMERCIAL

## 2023-07-27 DIAGNOSIS — F41.9 ANXIETY: Primary | ICD-10-CM

## 2023-07-27 PROCEDURE — 90832 PSYTX W PT 30 MINUTES: CPT | Performed by: SOCIAL WORKER

## 2023-07-29 NOTE — PSYCH
Visit start and stop times:    07/27/23  Start Time: 1301  Stop Time: 1332  Total Visit Time: 31 minutes  Virtual Regular Visit  Therapist met w/pt for individual session. Pt stated that he got into a big argument with his wife again. He continues to struggle w/racing thoughts and just wants "answers" from her. Pt stated that he is joining her in therapy this weekend and is hoping that it helps him move forward and let go. Verification of patient location:    Patient is located at Home in the following state in which I hold an active license [place]      Assessment/Plan: f/u in 3-4 weeks    Problem List Items Addressed This Visit        Other    Anxiety - Primary                Reason for visit is No chief complaint on file. Encounter provider Gilberto Monsalve    Provider located at [address] [place] [address]      Recent Visits  Date Type Provider Dept   07/27/23 Telemedicine [address] [facility] recent visits within past 7 days and meeting all other requirements  Future Appointments  No visits were found meeting these conditions. Showing future appointments within next 150 days and meeting all other requirements       The patient was identified by name and date of birth. Abelardo Valles was informed that this is a telemedicine visit and [facility] He agrees to proceed. .  My office door was closed. No one else was in the room. He acknowledged consent and understanding of privacy and security of the video platform. The patient has agreed to participate and understands they can discontinue the visit at any time. Patient is aware this is a billable service. Subjective  Abelardo Valles is a 38 y.o. male  .       HPI     Past Medical History:   Diagnosis Date • Anxiety    • Hemorrhoids        Past Surgical History:   Procedure Laterality Date   • HERNIA REPAIR         Current Outpatient Medications   Medication Sig Dispense Refill   • albuterol (PROVENTIL HFA,VENTOLIN HFA) 90 mcg/act inhaler Inhale 2 puffs every 6 (six) hours as needed for wheezing or shortness of breath 1 Inhaler 0   • albuterol (PROVENTIL HFA,VENTOLIN HFA) 90 mcg/act inhaler INHALE 2 PUFFS BY MOUTH EVERY 6 HOURS AS NEEDED FOR WHEEZE 8.5 g 3   • clonazePAM (KlonoPIN) 0.5 mg tablet Take 1 tablet (0.5 mg total) by mouth 2 (two) times a day as needed for anxiety or seizures 45 tablet 0   • escitalopram (LEXAPRO) 20 mg tablet TAKE 1 TABLET BY MOUTH EVERY DAY 90 tablet 1   • fenofibrate (TRICOR) 145 mg tablet TAKE 1 TABLET BY MOUTH EVERY DAY 90 tablet 1     No current facility-administered medications for this visit. Allergies   Allergen Reactions   • Penicillins Hives       Review of Systems    Video Exam    There were no vitals filed for this visit.     Physical Exam Pt denied any SI/HI/Ah/VH

## 2023-08-24 ENCOUNTER — TELEMEDICINE (OUTPATIENT)
Dept: BEHAVIORAL/MENTAL HEALTH CLINIC | Facility: CLINIC | Age: 43
End: 2023-08-24
Payer: COMMERCIAL

## 2023-08-24 DIAGNOSIS — F41.9 ANXIETY: Primary | ICD-10-CM

## 2023-08-24 PROCEDURE — 90832 PSYTX W PT 30 MINUTES: CPT | Performed by: SOCIAL WORKER

## 2023-09-05 DIAGNOSIS — F41.9 ANXIETY: ICD-10-CM

## 2023-09-06 RX ORDER — CLONAZEPAM 0.5 MG/1
0.5 TABLET ORAL 2 TIMES DAILY PRN
Qty: 45 TABLET | Refills: 0 | Status: SHIPPED | OUTPATIENT
Start: 2023-09-06

## 2023-09-21 ENCOUNTER — TELEMEDICINE (OUTPATIENT)
Dept: BEHAVIORAL/MENTAL HEALTH CLINIC | Facility: CLINIC | Age: 43
End: 2023-09-21
Payer: COMMERCIAL

## 2023-09-21 DIAGNOSIS — F41.1 GAD (GENERALIZED ANXIETY DISORDER): Primary | ICD-10-CM

## 2023-09-21 PROCEDURE — 90847 FAMILY PSYTX W/PT 50 MIN: CPT | Performed by: SOCIAL WORKER

## 2023-09-25 NOTE — PSYCH
Visit start and stop times:    09/24/23  Start Time: 1330  Stop Time: 1410  Total Visit Time: 40 minutes  Virtual Regular Visit  Therapist met w/pt and pt's wife for session. Discussion was held around differences both pt and his wife have in terms of problem solving. Pt stated that he needs to talk about the past to process and move forward and his wife doesn't like to reflect on the past and just wants to move forward. Discussion was held around his wife cheating on him last year and how it triggered pt's anxiety that much more. Discussion was held around ways pt has been trying to improve his anxiety but also wanting to talk about things. Verification of patient location:    Patient is located at Home in the following state in which I hold an active license [place]      Assessment/Plan: f/u in 3-4 weeks    Problem List Items Addressed This Visit        Other    GCUCI (generalized anxiety disorder) - Primary            Reason for visit is No chief complaint on file. Encounter provider Guille Berry    Provider located at [address]      Recent Visits  Date Type Provider Dept   09/21/23 Telemedicine [address] [facility] [address] recent visits within past 7 days and meeting all other requirements  Future Appointments  No visits were found meeting these conditions. Showing future appointments within next 150 days and meeting all other requirements       The patient was identified by name and date of birth. Jag Bansal was informed that this is a telemedicine visit and that [facility]. He agrees to proceed. .  My office door was closed. No one else was in the room. He acknowledged consent and understanding of privacy and security of the video platform.  The patient has agreed to participate and understands they can discontinue the visit at any time. Patient is aware this is a billable service. Rufina Vasques is a 41 y.o. male  . HPI     Past Medical History:   Diagnosis Date   • Anxiety    • Hemorrhoids        Past Surgical History:   Procedure Laterality Date   • HERNIA REPAIR         Current Outpatient Medications   Medication Sig Dispense Refill   • albuterol (PROVENTIL HFA,VENTOLIN HFA) 90 mcg/act inhaler Inhale 2 puffs every 6 (six) hours as needed for wheezing or shortness of breath 1 Inhaler 0   • albuterol (PROVENTIL HFA,VENTOLIN HFA) 90 mcg/act inhaler INHALE 2 PUFFS BY MOUTH EVERY 6 HOURS AS NEEDED FOR WHEEZE 8.5 g 3   • clonazePAM (KlonoPIN) 0.5 mg tablet Take 1 tablet (0.5 mg total) by mouth 2 (two) times a day as needed for anxiety or seizures 45 tablet 0   • escitalopram (LEXAPRO) 20 mg tablet TAKE 1 TABLET BY MOUTH EVERY DAY 90 tablet 1   • fenofibrate (TRICOR) 145 mg tablet TAKE 1 TABLET BY MOUTH EVERY DAY 90 tablet 1     No current facility-administered medications for this visit. Allergies   Allergen Reactions   • Penicillins Hives       Review of Systems    Video Exam    There were no vitals filed for this visit.     Physical Exam  Pt denied any SI/HI/AH/VH

## 2023-10-18 DIAGNOSIS — F41.9 ANXIETY: ICD-10-CM

## 2023-10-19 RX ORDER — CLONAZEPAM 0.5 MG/1
0.5 TABLET ORAL 2 TIMES DAILY PRN
Qty: 45 TABLET | Refills: 0 | Status: SHIPPED | OUTPATIENT
Start: 2023-10-19

## 2023-10-20 DIAGNOSIS — J45.909 EXTRINSIC ASTHMA WITHOUT COMPLICATION, UNSPECIFIED ASTHMA SEVERITY, UNSPECIFIED WHETHER PERSISTENT: ICD-10-CM

## 2023-10-24 RX ORDER — ALBUTEROL SULFATE 90 UG/1
2 AEROSOL, METERED RESPIRATORY (INHALATION) EVERY 6 HOURS PRN
Qty: 8.5 G | Refills: 0 | Status: SHIPPED | OUTPATIENT
Start: 2023-10-24

## 2023-10-24 NOTE — TELEPHONE ENCOUNTER
Can you please assist. Patient is calling again about a refill for their inhaler.  Snap[employer] App denied it because they said to review dosage information

## 2023-11-13 DIAGNOSIS — J45.909 EXTRINSIC ASTHMA WITHOUT COMPLICATION, UNSPECIFIED ASTHMA SEVERITY, UNSPECIFIED WHETHER PERSISTENT: ICD-10-CM

## 2023-11-13 RX ORDER — ALBUTEROL SULFATE 90 UG/1
AEROSOL, METERED RESPIRATORY (INHALATION)
Qty: 9 G | Refills: 2 | Status: SHIPPED | OUTPATIENT
Start: 2023-11-13

## 2023-11-20 ENCOUNTER — TELEMEDICINE (OUTPATIENT)
Dept: BEHAVIORAL/MENTAL HEALTH CLINIC | Facility: CLINIC | Age: 43
End: 2023-11-20
Payer: COMMERCIAL

## 2023-11-20 DIAGNOSIS — F41.1 GAD (GENERALIZED ANXIETY DISORDER): Primary | ICD-10-CM

## 2023-11-20 PROCEDURE — 90834 PSYTX W PT 45 MINUTES: CPT | Performed by: SOCIAL WORKER

## 2023-11-21 NOTE — PSYCH
Visit start and stop times:    11/20/23  Start Time: 1100  Stop Time: 1140  Total Visit Time: 40 minutes  Virtual Regular Visit  Therapist met w/pt for individual session. Pt stated that he has been struggling a lot the past few weeks. He stated that there have been more incidents w/his wife and they have started marriage counseling. He stated that he doesn't have much hope but wants to try to fight for the family. Pt stated his anxiety has been more heightened. Symptoms include: irritability, excessive worry, heart palpitations. Therapist and pt discussed ongoing strategies to practice to help manage his symptoms. Verification of patient location:    Patient is located at Home in the following state in which I hold an active license [place]      Assessment/Plan: f/u in one month    Problem List Items Addressed This Visit          Other    GUCCI (generalized anxiety disorder) - Primary            Reason for visit is No chief complaint on file. Encounter provider Omar Anders    Provider located at [facility] [address] [facility] [address] [place] [address]  [address] [place] [address]      Recent Visits  Date Type Provider Dept   11/20/23 Telemedicine 200 [facility] recent visits within past 7 days and meeting all other requirements  Future Appointments  No visits were found meeting these conditions. Showing future appointments within next 150 days and meeting all other requirements       The patient was identified by name and date of birth. Luis Enrique Dyson was informed that this is a telemedicine visit and [facility] He agrees to proceed. .  My office door was closed. No one else was in the room. He acknowledged consent and understanding of privacy and security of the video platform.  The patient has agreed to participate and understands they can discontinue the visit at any time. Patient is aware this is a billable service. Rufina Saleem is a 41 y.o. male  . HPI     Past Medical History:   Diagnosis Date    Anxiety     Hemorrhoids        Past Surgical History:   Procedure Laterality Date    HERNIA REPAIR         Current Outpatient Medications   Medication Sig Dispense Refill    albuterol (PROVENTIL HFA,VENTOLIN HFA) 90 mcg/act inhaler Inhale 2 puffs every 6 (six) hours as needed for wheezing or shortness of breath 1 Inhaler 0    albuterol (PROVENTIL HFA,VENTOLIN HFA) 90 mcg/act inhaler INHALE 2 PUFFS EVERY 6 HOURS AS NEEDED FOR WHEEZING 9 g 2    clonazePAM (KlonoPIN) 0.5 mg tablet Take 1 tablet (0.5 mg total) by mouth 2 (two) times a day as needed for anxiety or seizures 45 tablet 0    escitalopram (LEXAPRO) 20 mg tablet TAKE 1 TABLET BY MOUTH EVERY DAY 90 tablet 1    fenofibrate (TRICOR) 145 mg tablet TAKE 1 TABLET BY MOUTH EVERY DAY 90 tablet 1     No current facility-administered medications for this visit. Allergies   Allergen Reactions    Penicillins Hives       Review of Systems    Video Exam    There were no vitals filed for this visit.     Physical Exam Pt denied any SI/HI/AH/VH

## 2023-11-30 DIAGNOSIS — F41.9 ANXIETY: ICD-10-CM

## 2023-12-04 DIAGNOSIS — F41.1 GAD (GENERALIZED ANXIETY DISORDER): ICD-10-CM

## 2023-12-04 RX ORDER — CLONAZEPAM 0.5 MG/1
0.5 TABLET ORAL 2 TIMES DAILY PRN
Qty: 45 TABLET | Refills: 0 | Status: SHIPPED | OUTPATIENT
Start: 2023-12-04

## 2023-12-04 RX ORDER — ESCITALOPRAM OXALATE 20 MG/1
TABLET ORAL
Qty: 90 TABLET | Refills: 1 | Status: SHIPPED | OUTPATIENT
Start: 2023-12-04

## 2023-12-18 ENCOUNTER — TELEMEDICINE (OUTPATIENT)
Dept: BEHAVIORAL/MENTAL HEALTH CLINIC | Facility: CLINIC | Age: 43
End: 2023-12-18
Payer: COMMERCIAL

## 2023-12-18 DIAGNOSIS — F41.1 GAD (GENERALIZED ANXIETY DISORDER): Primary | ICD-10-CM

## 2023-12-18 PROCEDURE — 90832 PSYTX W PT 30 MINUTES: CPT | Performed by: SOCIAL WORKER

## 2023-12-19 NOTE — PSYCH
Visit start and stop times:    12/18/23  Start Time: 1255  Stop Time: 1327  Total Visit Time: 32 minutes  Virtual Regular Visit  Therapist met w/pt for individual session.  Pt stated that he continues to do couples counseling but doesn't feel as if it is helping.  He stated that right now he just needs to focus on himself.  He stated he has been using his medication sparingly but at times when there is conflict he has noticed he tends to rely on it more.  Therapist and pt discussed other strategies to try to implement to help him manage his anxiety symptoms: racing thoughts, excessive worry, heart palpitations, etc.   Verification of patient location:    Patient is located at Home in the following state in which I hold an [place] license [place]      Assessment/Plan: f/u in one month  Problem List Items Addressed This Visit          Other    GUCCI (generalized anxiety disorder) - Primary              Reason for visit is No chief complaint on file.       Encounter provider Lima Davis    Provider located at [facility] [address] [address]  [address] [facility] [place] [address] [phone]      Recent Visits  Date Type Provider Dept   12/18/23 Telemedicine Lima Davis [facility] [address]   Showing recent visits within past 7 days and meeting all other requirements  Future Appointments  No visits were found meeting these conditions.  Showing future appointments within next 150 days and meeting all other requirements       The patient was identified by name and date of birth. Baron Lerner was informed that this is a telemedicine visit and that the visit is being conducted throughthe Epic Embedded platform. He agrees to proceed..  My office door was closed. No one else was in the room.  He acknowledged consent and understanding of privacy and security of the video platform. The patient has agreed to  participate and understands they can discontinue the visit at any time.    Patient is aware this is a billable service.     Subjective  Baron Lerner is a 43 y.o. male  .      HPI     Past Medical History:   Diagnosis Date    Anxiety     Hemorrhoids        Past Surgical History:   Procedure Laterality Date    HERNIA REPAIR         Current Outpatient Medications   Medication Sig Dispense Refill    albuterol (PROVENTIL HFA,VENTOLIN HFA) 90 mcg/act inhaler Inhale 2 puffs every 6 (six) hours as needed for wheezing or shortness of breath 1 Inhaler 0    albuterol (PROVENTIL HFA,VENTOLIN HFA) 90 mcg/act inhaler INHALE 2 PUFFS EVERY 6 HOURS AS NEEDED FOR WHEEZING 9 g 2    clonazePAM (KlonoPIN) 0.5 mg tablet Take 1 tablet (0.5 mg total) by mouth 2 (two) times a day as needed for anxiety or seizures 45 tablet 0    escitalopram (LEXAPRO) 20 mg tablet TAKE 1 TABLET BY MOUTH EVERY DAY 90 tablet 1    fenofibrate (TRICOR) 145 mg tablet TAKE 1 TABLET BY MOUTH EVERY DAY 90 tablet 1     No current facility-administered medications for this visit.        Allergies   Allergen Reactions    Penicillins Hives       Review of Systems    Video Exam    There were no vitals filed for this visit.    Physical Exam Pt denied any SI/HI/Ah/VH

## 2024-01-15 DIAGNOSIS — F41.9 ANXIETY: ICD-10-CM

## 2024-01-15 RX ORDER — CLONAZEPAM 0.5 MG/1
0.5 TABLET ORAL 2 TIMES DAILY PRN
Qty: 45 TABLET | Refills: 0 | Status: SHIPPED | OUTPATIENT
Start: 2024-01-15

## 2024-02-12 DIAGNOSIS — E78.2 MIXED HYPERLIPIDEMIA: ICD-10-CM

## 2024-02-13 DIAGNOSIS — J45.909 EXTRINSIC ASTHMA WITHOUT COMPLICATION, UNSPECIFIED ASTHMA SEVERITY, UNSPECIFIED WHETHER PERSISTENT: ICD-10-CM

## 2024-02-13 RX ORDER — FENOFIBRATE 145 MG/1
TABLET, COATED ORAL
Qty: 90 TABLET | Refills: 1 | Status: SHIPPED | OUTPATIENT
Start: 2024-02-13

## 2024-02-14 RX ORDER — ALBUTEROL SULFATE 90 UG/1
AEROSOL, METERED RESPIRATORY (INHALATION)
Qty: 8 G | Refills: 2 | Status: SHIPPED | OUTPATIENT
Start: 2024-02-14

## 2024-02-23 DIAGNOSIS — F41.9 ANXIETY: ICD-10-CM

## 2024-02-26 ENCOUNTER — TELEMEDICINE (OUTPATIENT)
Dept: BEHAVIORAL/MENTAL HEALTH CLINIC | Facility: CLINIC | Age: 44
End: 2024-02-26
Payer: COMMERCIAL

## 2024-02-26 DIAGNOSIS — F41.9 ANXIETY: Primary | ICD-10-CM

## 2024-02-26 PROCEDURE — 90832 PSYTX W PT 30 MINUTES: CPT | Performed by: SOCIAL WORKER

## 2024-02-27 RX ORDER — CLONAZEPAM 0.5 MG/1
0.5 TABLET ORAL 2 TIMES DAILY PRN
Qty: 45 TABLET | Refills: 0 | Status: SHIPPED | OUTPATIENT
Start: 2024-02-27

## 2024-02-27 NOTE — PSYCH
"    Visit start and stop times:    02/26/24  Start Time: 1100  Stop Time: 1134  Total Visit Time: 34 minutes  Virtual Regular Visit  Therapist met w/pt for individual session.  Pt shared that things have been the \"same.\"  Pt shared that he continues to stay at his house.  He stated that he isn't feeling as angry but is feeling more down with the current situation between him and his wife.  He stated that he feels disappointed that couples didn't help but for the time being is just focusing on himself.  He stated that he feels he has a good balance of work/personal.  Therapist and pt discussed progress pt has made in terms of work anxiety.  Pt stated that he has been triggered at home more when he is home alone.  Discussion was held around ways pt can continue to work on managing his anxiety.      Verification of patient location:    Patient is located at Home in the following state in which [place] an [place] license [place]      Assessment/Plan: f/u in a month    Problem List Items Addressed This Visit          Other    Anxiety - Primary              Reason for visit is No chief complaint on file.       Encounter provider Lima Davis    Provider located at [facility] [address] [facility] [address]   [address] [facility] [address] [phone]      Recent Visits  No visits were found meeting these conditions.  Showing recent visits within past 7 days and meeting all other requirements  Future Appointments  No visits were found meeting these conditions.  Showing future appointments within next 150 days and meeting all other requirements       The patient was identified by name and date of birth. Baron Lerner was informed that this is a telemedicine visit and that the visit is being conducted throughthe Epic Embedded platform. He agrees to proceed..  My office door was closed. No one else was in the room.  He acknowledged consent and " understanding of privacy and security of the video platform. The patient has agreed to participate and understands they can discontinue the visit at any time.    Patient is aware this is a billable service.     Subjective  Baron Lerner is a 43 y.o. male  .      HPI     Past Medical History:   Diagnosis Date    Anxiety     Hemorrhoids        Past Surgical History:   Procedure Laterality Date    HERNIA REPAIR         Current Outpatient Medications   Medication Sig Dispense Refill    albuterol (PROVENTIL HFA,VENTOLIN HFA) 90 mcg/act inhaler Inhale 2 puffs every 6 (six) hours as needed for wheezing or shortness of breath 1 Inhaler 0    albuterol (PROVENTIL HFA,VENTOLIN HFA) 90 mcg/act inhaler TAKE 2 PUFFS BY MOUTH EVERY 6 HOURS AS NEEDED FOR WHEEZE 8 g 2    clonazePAM (KlonoPIN) 0.5 mg tablet Take 1 tablet (0.5 mg total) by mouth 2 (two) times a day as needed for anxiety or seizures 45 tablet 0    escitalopram (LEXAPRO) 20 mg tablet TAKE 1 TABLET BY MOUTH EVERY DAY 90 tablet 1    fenofibrate (TRICOR) 145 mg tablet TAKE 1 TABLET BY MOUTH EVERY DAY 90 tablet 1     No current facility-administered medications for this visit.        Allergies   Allergen Reactions    Penicillins Hives       Review of Systems    Video Exam    There were no vitals filed for this visit.    Physical Exam Pt denied any SI/HI/AH/VH

## 2024-03-05 NOTE — TELEPHONE ENCOUNTER
Daily Note     Today's date: 3/5/2024  Patient name: Deion Wu  : 1946  MRN: [id number]  Referring provider: Colton Lees DO  Dx:   Encounter Diagnosis     ICD-10-CM    1. Weakness of left upper extremity  R29.898                      Subjective: I'm good this morning, I am following my exercises       Objective: See treatment diary below      Assessment: Tolerated treatment well. Patient exhibited good technique with therapeutic exercises. Pt compliant with HEP; able to inc weight from 2# to 3#. Inc strength noted.       Plan: Continue per plan of care.      Precautions:cervical laminectomy c3-6 8/15/23    [employer]  Access Code: [email or web address]        Manuals 2/23 2/27 2/29 3/5 1/25 1/30 2/6 2/8 2/16  RE                                                         HEP 3x day                                                                                                        Ther Ex             PROM L UE 5m 5m           Wrist ext /rd 2#  3x10 #1 3x110 1#  3x10 2#  3x10 2#  3x10 2#  3x10 2#  3x10 2#  3x10  2#  3x10   Hammer sup Sm  3x10 SM 3x10 Sm  3x10 Sm  3x10 Sm  3x10 Sm  3x10 Sm  3x10  Sm  3x10      Flexbar twist /P/S  Red  3x10        Red  3x10 Red  3x10   Shoulder flex 0 gravity 3x10 3x10 3x10 3x10 3x10 3x10 3x10 3x10 3x10 3x10   Elbow flex 0 graviy 3x10 3# cuff 3x10 3x10 3x10 3x10 3x10 3x10 3x10  1#  Cuff  3x10   gripping   BOP BPW 2x30 BPW  2x30 BPW  2x30 BPW  3x10 GPW  3x10 GPW  3x10 BPW  3x10 BPW 3x10 BPW  3x10   UBE     3/3 4//4 3/3 4/4 3/3     Ther Activity             Flexbar twist                Red  3x10   Stress load      2m 2m 1m 1m      Webslide row Black  3x10 Black  3x10 Black  3x10 Black  3x10 Black  3x10 Black  3x10 Black  3x10 Black  3x10 Black  3x10 Black  3x10   Webslide elbow ext shl ext Black'3x10 Black  3x10 Black  3x10 Black  3x10 Black  3x10 Black  3x10 Black  3x10 Black  3x10 Black  3x10 Black  3x10   Flexbar P/S Red  3x10 Green  3x10 Green  P/S  Patient called asking for a refill G  3x10 Red  3x10 Red  3x10  red  3x10 Green sup    Red pro   3x10 Red  3x10   Chest press   supine    3x10 5#     2# 5#  3x10   Shld flex   supine 5#  Bar   3x10 5# dowel   3x10 3# dowel   3x10 5#  Dowel  3x10 5#  Dowel   3x10 5#  Dowel  3x10 5#  Dowel  3x10 5#  dowel 5#  dowel  3x10 5#  bar  3x10   Elbow flex in sidelie  ) gravity 3x10  1x10 3x10 3x10 3x10 3x10 3x10 3x10 3x10 3x10 3x10  1#

## 2024-04-09 DIAGNOSIS — F41.9 ANXIETY: ICD-10-CM

## 2024-04-10 RX ORDER — CLONAZEPAM 0.5 MG/1
0.5 TABLET ORAL 2 TIMES DAILY PRN
Qty: 45 TABLET | Refills: 0 | Status: SHIPPED | OUTPATIENT
Start: 2024-04-10

## 2024-04-12 ENCOUNTER — TELEMEDICINE (OUTPATIENT)
Dept: BEHAVIORAL/MENTAL HEALTH CLINIC | Facility: CLINIC | Age: 44
End: 2024-04-12
Payer: COMMERCIAL

## 2024-04-12 DIAGNOSIS — F41.1 GAD (GENERALIZED ANXIETY DISORDER): Primary | ICD-10-CM

## 2024-04-12 PROCEDURE — 90832 PSYTX W PT 30 MINUTES: CPT | Performed by: SOCIAL WORKER

## 2024-04-15 NOTE — PSYCH
"    Visit start and stop times:    04/12/24  Start Time: 1310  Stop Time: 1340  Total Visit Time: 30 minutes  Virtual Regular Visit  Therapist met w/pt for individual session.  Symptoms include: excessive worry, racing thoughts, irritability, feeling on edge. Pt shared that work continues to be a stressor but has been able to remove himself when needed.  He stated that he has worked longer days which have added to his overall exhaustion and irritability.  He stated that home has been \"okay.\"He stated that there hasn't been any conflicts but still doesn't feel like things are back to \"normal.\"  He stated that he continues to struggle w/trusting his wife but he is focusing on his kids and his family as a unit.  Therapist and pt discussed different anxiety triggers that come up when he is home and how he best navigates them.    Verification of patient location:    Patient is located at Home in the following state in which I hold an active license [place]      Assessment/Plan: f/u in one month    Problem List Items Addressed This Visit          Behavioral Health    GUCCI (generalized anxiety disorder) - Primary            Reason for visit is No chief complaint on file.       Encounter provider Lima Davis    Provider located at [facility] [address] [facility] [address]  [address] [facility] [address] [phone]      Recent Visits  Date Type Provider Dept   04/12/24 Telemedicine Lima Davis  [facility] [address]   Showing recent visits within past 7 days and meeting all other requirements  Future Appointments  No visits were found meeting these conditions.  Showing future appointments within next 150 days and meeting all other requirements       The patient was identified by name and date of birth. Baron Lerner was informed that this is a telemedicine visit and that [facility] platform. He agrees to proceed..  My office door was closed. No one else was in the room.  He acknowledged consent and understanding of privacy and security of the video platform. The patient has agreed to participate and understands they can discontinue the visit at any time.    Patient is aware this is a billable service.     Subjective  Baron Lerner is a 43 y.o. male  .      HPI     Past Medical History:   Diagnosis Date    Anxiety     Hemorrhoids        Past Surgical History:   Procedure Laterality Date    HERNIA REPAIR         Current Outpatient Medications   Medication Sig Dispense Refill    albuterol (PROVENTIL HFA,VENTOLIN HFA) 90 mcg/act inhaler Inhale 2 puffs every 6 (six) hours as needed for wheezing or shortness of breath 1 Inhaler 0    albuterol (PROVENTIL HFA,VENTOLIN HFA) 90 mcg/act inhaler TAKE 2 PUFFS BY MOUTH EVERY 6 HOURS AS NEEDED FOR WHEEZE 8 g 2    clonazePAM (KlonoPIN) 0.5 mg tablet Take 1 tablet (0.5 mg total) by mouth 2 (two) times a day as needed for anxiety or seizures 45 tablet 0    escitalopram (LEXAPRO) 20 mg tablet TAKE 1 TABLET BY MOUTH EVERY DAY 90 tablet 1    fenofibrate (TRICOR) 145 mg tablet TAKE 1 TABLET BY MOUTH EVERY DAY 90 tablet 1     No current facility-administered medications for this visit.        Allergies   Allergen Reactions    Penicillins Hives       Review of Systems    Video Exam    There were no vitals filed for this visit.    Physical Exam Pt denied any SI/HI/Ah/VH

## 2024-04-24 DIAGNOSIS — B34.9 VIRAL INFECTION, UNSPECIFIED: ICD-10-CM

## 2024-04-24 RX ORDER — ALBUTEROL SULFATE 90 UG/1
2 AEROSOL, METERED RESPIRATORY (INHALATION) EVERY 6 HOURS PRN
Qty: 8 G | Refills: 2 | Status: SHIPPED | OUTPATIENT
Start: 2024-04-24

## 2024-04-25 ENCOUNTER — TELEPHONE (OUTPATIENT)
Age: 44
End: 2024-04-25

## 2024-04-25 ENCOUNTER — TELEPHONE (OUTPATIENT)
Dept: FAMILY MEDICINE CLINIC | Facility: CLINIC | Age: 44
End: 2024-04-25

## 2024-04-25 NOTE — TELEPHONE ENCOUNTER
Prior auth request received via fax from Cover my meds  4/25          albuterol (PROVENTIL HFA,VENTOLIN HFA) 90 mcg/act inhaler       Key [id number]

## 2024-04-26 NOTE — TELEPHONE ENCOUNTER
PA for albuterol (PROVENTIL HFA,VENTOLIN HFA) 90 mcg/act inhaler     Submitted via    [x]CMM-KEY [id number]  []Surescripts-Case ID #   []Faxed to plan   []Other website   []Phone call Case ID #     Office notes sent, clinical questions answered. Awaiting determination    Turnaround time for your insurance to make a decision on your Prior Authorization can take 7-21 business days.

## 2024-04-29 NOTE — TELEPHONE ENCOUNTER
PA for albuterol (PROVENTIL HFA,VENTOLIN HFA) 90 mcg/act inhaler not required     Reason- (screenshot if applicable)              Message sent to provider pool No

## 2024-05-06 DIAGNOSIS — F41.9 ANXIETY: ICD-10-CM

## 2024-05-07 RX ORDER — CLONAZEPAM 0.5 MG/1
0.5 TABLET ORAL 2 TIMES DAILY PRN
Qty: 45 TABLET | Refills: 0 | Status: SHIPPED | OUTPATIENT
Start: 2024-05-07

## 2024-05-10 ENCOUNTER — TELEMEDICINE (OUTPATIENT)
Dept: BEHAVIORAL/MENTAL HEALTH CLINIC | Facility: CLINIC | Age: 44
End: 2024-05-10
Payer: COMMERCIAL

## 2024-05-10 DIAGNOSIS — F41.9 ANXIETY: Primary | ICD-10-CM

## 2024-05-10 PROCEDURE — 90832 PSYTX W PT 30 MINUTES: CPT | Performed by: SOCIAL WORKER

## 2024-05-13 NOTE — PSYCH
"  Visit start and stop times:    05/10/24  Start Time: 1300  Stop Time: 1330  Total Visit Time: 30 minutes  Virtual Regular Visit  Therapist met w/pt for individual session.  Pt shared that work continues to be stressful but that he is doing a good job at walking away when he gets angry or irritable.  Pt shared that he does feel when his anxiety is beginning to get more  heightened and focuses on grounding himself or at times needing to step away and take his prn medication.  Pt shared that things at home continue to be \"okay.\"  He stated that things aren't resolved but that they aren't getting into rights like they used to.      Verification of patient location:    Patient is located at Home in the following state in which I hold an active license [place]      Assessment/Plan:    Problem List Items Addressed This Visit          Behavioral Health    Anxiety - Primary          Reason for visit is No chief complaint on file.       Encounter provider Lima Davis      Recent Visits  Date Type Provider Dept   05/10/24 Telemedicine [facility] [address]   Showing recent visits within past 7 days and meeting all other requirements  Future Appointments  No visits were found meeting these conditions.  Showing future appointments within next 150 days and meeting all other requirements       The patient was identified by name and date of birth. Baron Lerner was informed that this is a telemedicine visit and that the visit is being conducted throughthe Epic Embedded platform. He agrees to proceed..  My office door was closed. No one else was in the room.  He acknowledged consent and understanding of privacy and security of the video platform. The patient has agreed to participate and understands they can discontinue the visit at any time.    Patient is aware this is a billable service.     Subjective  Baron Lerner is a 43 y.o. male  .      HPI     Past Medical History:   Diagnosis Date    " Anxiety     Hemorrhoids        Past Surgical History:   Procedure Laterality Date    HERNIA REPAIR         Current Outpatient Medications   Medication Sig Dispense Refill    albuterol (PROVENTIL HFA,VENTOLIN HFA) 90 mcg/act inhaler TAKE 2 PUFFS BY MOUTH EVERY 6 HOURS AS NEEDED FOR WHEEZE 8 g 2    albuterol (PROVENTIL HFA,VENTOLIN HFA) 90 mcg/act inhaler Inhale 2 puffs every 6 (six) hours as needed for wheezing or shortness of breath 8 g 2    clonazePAM (KlonoPIN) 0.5 mg tablet Take 1 tablet (0.5 mg total) by mouth 2 (two) times a day as needed for anxiety or seizures 45 tablet 0    escitalopram (LEXAPRO) 20 mg tablet TAKE 1 TABLET BY MOUTH EVERY DAY 90 tablet 1    fenofibrate (TRICOR) 145 mg tablet TAKE 1 TABLET BY MOUTH EVERY DAY 90 tablet 1     No current facility-administered medications for this visit.        Allergies   Allergen Reactions    Penicillins Hives       Review of Systems    Video Exam    There were no vitals filed for this visit.    Physical Exam Pt denied any SI/HI/AH/VH

## 2024-06-18 ENCOUNTER — TELEMEDICINE (OUTPATIENT)
Dept: BEHAVIORAL/MENTAL HEALTH CLINIC | Facility: CLINIC | Age: 44
End: 2024-06-18
Payer: COMMERCIAL

## 2024-06-18 DIAGNOSIS — F41.1 GAD (GENERALIZED ANXIETY DISORDER): Primary | ICD-10-CM

## 2024-06-18 DIAGNOSIS — F41.1 GAD (GENERALIZED ANXIETY DISORDER): ICD-10-CM

## 2024-06-18 PROCEDURE — 90834 PSYTX W PT 45 MINUTES: CPT | Performed by: SOCIAL WORKER

## 2024-06-18 RX ORDER — ESCITALOPRAM OXALATE 20 MG/1
TABLET ORAL
Qty: 30 TABLET | Refills: 0 | Status: SHIPPED | OUTPATIENT
Start: 2024-06-18

## 2024-06-22 DIAGNOSIS — F41.9 ANXIETY: ICD-10-CM

## 2024-06-24 RX ORDER — CLONAZEPAM 0.5 MG/1
0.5 TABLET ORAL 2 TIMES DAILY PRN
Qty: 45 TABLET | Refills: 0 | Status: SHIPPED | OUTPATIENT
Start: 2024-06-24

## 2024-06-24 NOTE — BH TREATMENT PLAN
"Outpatient Behavioral Health Psychotherapy Treatment Plan    Baron Lerner  1980     Date of Initial Psychotherapy Assessment: 6/18/2024  Date of Current Treatment Plan: 06/18/24  Treatment Plan Target Date: 12/18/2024  Treatment Plan Expiration Date: TBD    Diagnosis:   1. GUCCI (generalized anxiety disorder)            Area(s) of Need: lack of supports, lack of coping skills    Long Term Goal 1 (in the client's own words): \"I just want to be able to manage my anxiety.\"    Stage of Change: Preparation    Target Date for completion: TBD     Anticipated therapeutic modalities: CBT, DBT     People identified to complete this goal: Pt      Objective 1: (identify the means of measuring success in meeting the objective): Pt will describe situations, thoughts,   feelings, and actions associated with anxieties and worries attempts to resolve them in each session.       Objective 2: (identify the means of measuring success in meeting the objective): Pt will implement 2 or more coping skills and share them in session 1x/month.          I am currently under the care of a [facility] psychiatric provider: yes    My [facility] psychiatric provider is: PCP    I am currently taking psychiatric medications: Yes, as prescribed    I feel that I will be ready for discharge from mental health care when I reach the following (measurable goal/objective): \"when I don't need to take my medication anymore\"    For children and adults who have a legal guardian:   Has there been any change to custody orders and/or guardianship status? NA. If yes, attach updated documentation.    I have created my Crisis Plan and have been offered a copy of this plan    Behavioral Health Treatment Plan  Luke: Diagnosis and Treatment Plan explained to Baron Lerner acknowledges an understanding of their diagnosis. Baron Lerner agrees to this treatment plan.    I have been offered a copy of this Treatment Plan. Yes    Baron" Yann, 1980, actively participated in the creation of this treatment plan during a virtual session, using the Epic Embedded platform.   Baron Lerner  provided verbal consent on 6/18/2024 at 11:45 AM. The treatment plan was transcribed into the Electronic Health Record at a later time.

## 2024-06-24 NOTE — PSYCH
Behavioral Health Psychotherapy Progress Note    Psychotherapy Provided: Individual Psychotherapy     1. GUCCI (generalized anxiety disorder)              Visit start and stop times:    06/18/24  Start Time: 1115  Stop Time: 1200  Total Visit Time: 45 minutes  Virtual Regular Visit  Therapist met w/pt for individual session.  Pt shared that he has felt more irritable and quick to anger especially at work.  Therapist and pt discussed how he has been burning himself out and working a lot.  Pt shared that he also doing renovations at home which has been a lot.  He continues to have a strained relationship with his wife but is accepting it for what it is right now because of the kids.  He stated that he is fine doing this and wants to be around his kids.  Therapist and pt discussed ongoing strategies to help him manage his irritability/anxiety.  Therapist and pt completed safety plan and tx plan.     Verification of patient location:    Patient is located at Home in the following state in which I hold an active license [place]      Assessment/Plan: f/u in a month    Problem List Items Addressed This Visit          Behavioral Health    GUCCI (generalized anxiety disorder) - Primary                Reason for visit is No chief complaint on file.       Encounter provider Lima Davis      Recent Visits  Date Type Provider Dept   06/18/24 Telemedicine [facility] [address]   Showing recent visits within past 7 days and meeting all other requirements  Future Appointments  No visits were found meeting these conditions.  Showing future appointments within next 150 days and meeting all other requirements       The patient was identified by name and date of birth. Baron Lerner was informed that this is a telemedicine visit and that the visit is being conducted throughthe Epic Embedded platform. He agrees to proceed..  My office door was closed. No one else was in the room.  He acknowledged consent  and understanding of privacy and security of the video platform. The patient has agreed to participate and understands they can discontinue the visit at any time.    Patient is aware this is a billable service.     Subjective  Baron Lerner is a 43 y.o. male  .      HPI     Past Medical History:   Diagnosis Date    Anxiety     Hemorrhoids        Past Surgical History:   Procedure Laterality Date    HERNIA REPAIR         Current Outpatient Medications   Medication Sig Dispense Refill    albuterol (PROVENTIL HFA,VENTOLIN HFA) 90 mcg/act inhaler TAKE 2 PUFFS BY MOUTH EVERY 6 HOURS AS NEEDED FOR WHEEZE 8 g 2    albuterol (PROVENTIL HFA,VENTOLIN HFA) 90 mcg/act inhaler Inhale 2 puffs every 6 (six) hours as needed for wheezing or shortness of breath 8 g 2    clonazePAM (KlonoPIN) 0.5 mg tablet Take 1 tablet (0.5 mg total) by mouth 2 (two) times a day as needed for anxiety or seizures 45 tablet 0    escitalopram (LEXAPRO) 20 mg tablet TAKE 1 TABLET BY MOUTH EVERY DAY 30 tablet 0    fenofibrate (TRICOR) 145 mg tablet TAKE 1 TABLET BY MOUTH EVERY DAY 90 tablet 1     No current facility-administered medications for this visit.        Allergies   Allergen Reactions    Penicillins Hives       Review of Systems    Video Exam    There were no vitals filed for this visit.    Physical Exam Pt denied any SI/HI/AH/VH

## 2024-07-15 ENCOUNTER — TELEMEDICINE (OUTPATIENT)
Dept: BEHAVIORAL/MENTAL HEALTH CLINIC | Facility: CLINIC | Age: 44
End: 2024-07-15
Payer: COMMERCIAL

## 2024-07-15 DIAGNOSIS — E78.2 MIXED HYPERLIPIDEMIA: Primary | ICD-10-CM

## 2024-07-15 DIAGNOSIS — F41.1 GAD (GENERALIZED ANXIETY DISORDER): Primary | ICD-10-CM

## 2024-07-15 DIAGNOSIS — F41.1 GAD (GENERALIZED ANXIETY DISORDER): ICD-10-CM

## 2024-07-15 PROCEDURE — 90834 PSYTX W PT 45 MINUTES: CPT | Performed by: SOCIAL WORKER
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
## 2024-07-18 DIAGNOSIS — B34.9 VIRAL INFECTION, UNSPECIFIED: ICD-10-CM

## 2024-07-18 DIAGNOSIS — F41.1 GAD (GENERALIZED ANXIETY DISORDER): ICD-10-CM

## 2024-07-18 RX ORDER — ESCITALOPRAM OXALATE 20 MG/1
TABLET ORAL
Qty: 90 TABLET | Refills: 1 | Status: SHIPPED | OUTPATIENT
Start: 2024-07-18

## 2024-07-18 RX ORDER — ALBUTEROL SULFATE 90 UG/1
AEROSOL, METERED RESPIRATORY (INHALATION)
Qty: 8.5 G | Refills: 0 | Status: SHIPPED | OUTPATIENT
Start: 2024-07-18

## 2024-07-22 ENCOUNTER — TELEMEDICINE (OUTPATIENT)
Dept: BEHAVIORAL/MENTAL HEALTH CLINIC | Facility: CLINIC | Age: 44
End: 2024-07-22
Payer: COMMERCIAL

## 2024-07-22 ENCOUNTER — APPOINTMENT (OUTPATIENT)
Age: 44
End: 2024-07-22
Payer: COMMERCIAL

## 2024-07-22 DIAGNOSIS — F41.1 GAD (GENERALIZED ANXIETY DISORDER): Primary | ICD-10-CM

## 2024-07-22 DIAGNOSIS — E78.2 MIXED HYPERLIPIDEMIA: ICD-10-CM

## 2024-07-22 DIAGNOSIS — F41.1 GAD (GENERALIZED ANXIETY DISORDER): ICD-10-CM

## 2024-07-22 LAB
ALBUMIN SERPL BCG-MCNC: 4.7 G/DL (ref 3.5–5)
ALP SERPL-CCNC: 31 U/L (ref 34–104)
ALT SERPL W P-5'-P-CCNC: 53 U/L (ref 7–52)
ANION GAP SERPL CALCULATED.3IONS-SCNC: 14 MMOL/L (ref 4–13)
AST SERPL W P-5'-P-CCNC: 36 U/L (ref 13–39)
BASOPHILS # BLD AUTO: 0.02 THOUSANDS/ÂΜL (ref 0–0.1)
BASOPHILS NFR BLD AUTO: 0 % (ref 0–1)
BILIRUB SERPL-MCNC: 0.52 MG/DL (ref 0.2–1)
BUN SERPL-MCNC: 18 MG/DL (ref 5–25)
CALCIUM SERPL-MCNC: 9.7 MG/DL (ref 8.4–10.2)
CHLORIDE SERPL-SCNC: 102 MMOL/L (ref 96–108)
CHOLEST SERPL-MCNC: 177 MG/DL
CO2 SERPL-SCNC: 24 MMOL/L (ref 21–32)
CREAT SERPL-MCNC: 1.29 MG/DL (ref 0.6–1.3)
EOSINOPHIL # BLD AUTO: 0.61 THOUSAND/ÂΜL (ref 0–0.61)
EOSINOPHIL NFR BLD AUTO: 8 % (ref 0–6)
ERYTHROCYTE [DISTWIDTH] IN BLOOD BY AUTOMATED COUNT: 12.4 % (ref 11.6–15.1)
GFR SERPL CREATININE-BSD FRML MDRD: 68 ML/MIN/1.73SQ M
GLUCOSE P FAST SERPL-MCNC: 84 MG/DL (ref 65–99)
HCT VFR BLD AUTO: 42.9 % (ref 36.5–49.3)
HDLC SERPL-MCNC: 39 MG/DL
HGB BLD-MCNC: 13.9 G/DL (ref 12–17)
IMM GRANULOCYTES # BLD AUTO: 0.02 THOUSAND/UL (ref 0–0.2)
IMM GRANULOCYTES NFR BLD AUTO: 0 % (ref 0–2)
LDLC SERPL CALC-MCNC: 118 MG/DL (ref 0–100)
LYMPHOCYTES # BLD AUTO: 2.42 THOUSANDS/ÂΜL (ref 0.6–4.47)
LYMPHOCYTES NFR BLD AUTO: 33 % (ref 14–44)
MCH RBC QN AUTO: 28.4 PG (ref 26.8–34.3)
MCHC RBC AUTO-ENTMCNC: 32.4 G/DL (ref 31.4–37.4)
MCV RBC AUTO: 88 FL (ref 82–98)
MONOCYTES # BLD AUTO: 0.67 THOUSAND/ÂΜL (ref 0.17–1.22)
MONOCYTES NFR BLD AUTO: 9 % (ref 4–12)
NEUTROPHILS # BLD AUTO: 3.5 THOUSANDS/ÂΜL (ref 1.85–7.62)
NEUTS SEG NFR BLD AUTO: 50 % (ref 43–75)
NRBC BLD AUTO-RTO: 0 /100 WBCS
PLATELET # BLD AUTO: 342 THOUSANDS/UL (ref 149–390)
PMV BLD AUTO: 11.4 FL (ref 8.9–12.7)
POTASSIUM SERPL-SCNC: 4.2 MMOL/L (ref 3.5–5.3)
PROT SERPL-MCNC: 7.7 G/DL (ref 6.4–8.4)
RBC # BLD AUTO: 4.9 MILLION/UL (ref 3.88–5.62)
SODIUM SERPL-SCNC: 140 MMOL/L (ref 135–147)
TRIGL SERPL-MCNC: 98 MG/DL
TSH SERPL DL<=0.05 MIU/L-ACNC: 2.81 UIU/ML (ref 0.45–4.5)
WBC # BLD AUTO: 7.24 THOUSAND/UL (ref 4.31–10.16)

## 2024-07-22 PROCEDURE — 84443 ASSAY THYROID STIM HORMONE: CPT

## 2024-07-22 PROCEDURE — 90832 PSYTX W PT 30 MINUTES: CPT | Performed by: SOCIAL WORKER

## 2024-07-22 PROCEDURE — 80053 COMPREHEN METABOLIC PANEL: CPT

## 2024-07-22 PROCEDURE — 36415 COLL VENOUS BLD VENIPUNCTURE: CPT

## 2024-07-22 PROCEDURE — 80061 LIPID PANEL: CPT

## 2024-07-22 PROCEDURE — 85025 COMPLETE CBC W/AUTO DIFF WBC: CPT

## 2024-07-22 NOTE — PSYCH
"Behavioral Health Psychotherapy Progress Note    Psychotherapy Provided: Individual Psychotherapy     1. GUCCI (generalized anxiety disorder)              Visit start and stop times:    07/22/24  Start Time: 1608  Stop Time: 1632  Total Visit Time: 24 minutes  Virtual Regular Visit  Therapist met w/pt for individual session.  Pt stated that he feels he is better today than he was last week. He stated he hasn't been \"in his head\" as much.  He stated that he is realizing he needs to start making decisions and not stay \"stuck\" in his current position.  He stated that he is still feeling a lot of hurt but is realizing that he will be okay \"alone.\"  Pt stated that he is just putting his time/energy into his kids and focusing on work.  He shared he continues to move up at work which has been a good distraction.    Verification of patient location:    Patient is located at Home in the following state in which I hold an [place] license [place]      Assessment/Plan: f/u in one month    Problem List Items Addressed This Visit       GUCCI (generalized anxiety disorder) - Primary            Reason for visit is   Chief Complaint   Patient presents with    Virtual Regular Visit        Encounter provider Lima Davis      Recent Visits  Date Type Provider Dept   07/15/24 Telemedicine Lima Davis  [facility] [address]   Showing recent visits within past 7 days and meeting all other requirements  Today's Visits  Date Type Provider Dept   07/22/24 Telemedicine Lima Davis  [facility] [address]   Showing today's visits and meeting all other requirements  Future Appointments  No visits were found meeting these conditions.  Showing future appointments within next 150 days and meeting all other requirements       The patient was identified by name and date of birth. Baron Lerner was informed that this is a telemedicine visit and that the visit is being conducted throughthe Epic Embedded platform. " He agrees to proceed..  My office door was closed. No one else was in the room.  He acknowledged consent and understanding of privacy and security of the video platform. The patient has agreed to participate and understands they can discontinue the visit at any time.    Patient is aware this is a billable service.     Subjective  Baron Lerner is a 43 y.o. male  .      HPI     Past Medical History:   Diagnosis Date    Anxiety     Hemorrhoids        Past Surgical History:   Procedure Laterality Date    HERNIA REPAIR         Current Outpatient Medications   Medication Sig Dispense Refill    albuterol (PROVENTIL HFA,VENTOLIN HFA) 90 mcg/act inhaler TAKE 2 PUFFS BY MOUTH EVERY 6 HOURS AS NEEDED FOR WHEEZE 8 g 2    albuterol (PROVENTIL HFA,VENTOLIN HFA) 90 mcg/act inhaler TAKE 2 PUFFS BY MOUTH EVERY 6 HOURS AS NEEDED FOR WHEEZE OR FOR SHORTNESS OF BREATH 8.5 g 0    clonazePAM (KlonoPIN) 0.5 mg tablet Take 1 tablet (0.5 mg total) by mouth 2 (two) times a day as needed for anxiety or seizures 45 tablet 0    escitalopram (LEXAPRO) 20 mg tablet TAKE 1 TABLET BY MOUTH EVERY DAY 90 tablet 1    fenofibrate (TRICOR) 145 mg tablet TAKE 1 TABLET BY MOUTH EVERY DAY 90 tablet 1     No current facility-administered medications for this visit.        Allergies   Allergen Reactions    Penicillins Hives       Review of Systems    Video Exam    There were no vitals filed for this visit.    Physical Exam  Pt denied any SI/Hi/AH/Vh

## 2024-07-23 ENCOUNTER — OFFICE VISIT (OUTPATIENT)
Dept: FAMILY MEDICINE CLINIC | Facility: CLINIC | Age: 44
End: 2024-07-23
Payer: COMMERCIAL

## 2024-07-23 VITALS
HEIGHT: 67 IN | OXYGEN SATURATION: 97 % | DIASTOLIC BLOOD PRESSURE: 80 MMHG | BODY MASS INDEX: 31.42 KG/M2 | SYSTOLIC BLOOD PRESSURE: 122 MMHG | WEIGHT: 200.2 LBS | HEART RATE: 70 BPM

## 2024-07-23 DIAGNOSIS — E78.2 MIXED HYPERLIPIDEMIA: ICD-10-CM

## 2024-07-23 DIAGNOSIS — Z00.01 ANNUAL VISIT FOR GENERAL ADULT MEDICAL EXAMINATION WITH ABNORMAL FINDINGS: Primary | ICD-10-CM

## 2024-07-23 DIAGNOSIS — J45.909 EXTRINSIC ASTHMA WITHOUT COMPLICATION, UNSPECIFIED ASTHMA SEVERITY, UNSPECIFIED WHETHER PERSISTENT: ICD-10-CM

## 2024-07-23 DIAGNOSIS — F41.1 GAD (GENERALIZED ANXIETY DISORDER): ICD-10-CM

## 2024-07-23 PROCEDURE — 99213 OFFICE O/P EST LOW 20 MIN: CPT | Performed by: FAMILY MEDICINE

## 2024-07-23 PROCEDURE — 99396 PREV VISIT EST AGE 40-64: CPT | Performed by: FAMILY MEDICINE

## 2024-07-23 NOTE — ASSESSMENT & PLAN NOTE
Significant improvement in lipid profile with started Tricor encouraged to continue dietary modifications

## 2024-07-23 NOTE — ASSESSMENT & PLAN NOTE
Not in exacerbation negative requirements for urgent care ER evaluations over the past.  Infrequent use of inhaler

## 2024-07-23 NOTE — ASSESSMENT & PLAN NOTE
Discussed and reviewed current plan of care will continue current course maintaining relationship with counselor/therapist

## 2024-07-23 NOTE — PROGRESS NOTES
Adult Annual Physical  Name: Baron Lerner      : 1980      MRN: [id number]  Encounter Provider: FEROZ Belle  Encounter Date: 2024   Encounter department: [facility] [address]    Assessment & Plan   1. Annual visit for general adult medical examination with abnormal findings  2. Mixed hyperlipidemia  Assessment & Plan:  Significant improvement in lipid profile with started Tricor encouraged to continue dietary modifications  Orders:  -     Lipid Panel with Direct LDL reflex; Future  -     Comprehensive metabolic panel; Future  3. GUCCI (generalized anxiety disorder)  Assessment & Plan:  Discussed and reviewed current plan of care will continue current course maintaining relationship with counselor/therapist  4. Extrinsic asthma without complication, unspecified asthma severity, unspecified whether persistent  Assessment & Plan:  Not in exacerbation negative requirements for urgent care ER evaluations over the past.  Infrequent use of inhaler    Immunizations and preventive care screenings were discussed with patient today. Appropriate education was printed on patient's after visit summary.        Counseling:  Alcohol/drug use: discussed moderation in alcohol intake, the recommendations for healthy alcohol use, and avoidance of illicit drug use.  Dental Health: discussed importance of regular tooth brushing, flossing, and dental visits.  Injury prevention: discussed safety/seat belts, safety helmets, smoke detectors, carbon dioxide detectors, and smoking near bedding or upholstery.  Exercise: the importance of regular exercise/physical activity was discussed. Recommend exercise 3-5 times per week for at least 30 minutes.       Depression Screening and Follow-up Plan: Patient was screened for depression during today's encounter. They screened negative with a PHQ-2 score of 2.    Tobacco Cessation Counseling: Tobacco cessation counseling was provided. The  patient is sincerely urged to quit consumption of tobacco. He is not ready to quit tobacco.       F/u   Anxiety / dep   Continues wiwt counseling , work has been going extremely well ,    potential   Spouse ? Divorce  Children are well   Medications continues with Lexapro and monitoring intake of benzo  Negative alcohol  No longer with chewing tobacco  Cholesterol continues to be Tricor tolerating negative issues, seen lipid profile significant improvement  Asthma rare use of inhaler          History of Present Illness     Adult Annual Physical:  Patient presents for annual physical.     Diet and Physical Activity:  - Diet/Nutrition: well balanced diet and limited junk food.  - Exercise: no formal exercise.    Depression Screening:  - PHQ-2 Score: 2    General Health:    - Hearing: normal hearing bilateral ears.  - Vision: no vision problems.  - Dental: regular dental visits.    Review of Systems   Constitutional:  Negative for appetite change, chills, fever and unexpected weight change.   HENT:  Negative for congestion, dental problem, ear pain, hearing loss, postnasal drip, rhinorrhea, sinus pressure, sinus pain, sneezing, sore throat, tinnitus and voice change.    Eyes:  Negative for visual disturbance.   Respiratory:  Negative for apnea, cough, chest tightness and shortness of breath.    Cardiovascular:  Negative for chest pain, palpitations and leg swelling.   Gastrointestinal:  Negative for abdominal pain, blood in stool, constipation, diarrhea, nausea and vomiting.   Endocrine: Negative for cold intolerance, heat intolerance, polydipsia, polyphagia and polyuria.   Genitourinary:  Negative for decreased urine volume, difficulty urinating, dysuria, frequency and hematuria.   Musculoskeletal:  Negative for arthralgias, back pain, gait problem, joint swelling and myalgias.   Skin:  Negative for color change, rash and wound.   Allergic/Immunologic: Negative for environmental allergies and food  "allergies.   Neurological:  Negative for dizziness, syncope, weakness, light-headedness, numbness and headaches.   Hematological:  Negative for adenopathy. Does not bruise/bleed easily.   Psychiatric/Behavioral:  Negative for sleep disturbance and suicidal ideas. The patient is not nervous/anxious.          Objective     /80 (BP Location: Left arm, Patient Position: Sitting, Cuff Size: Standard)   Pulse 70   Ht 5' 7\" (1.702 m)   Wt 90.8 kg (200 lb 3.2 oz)   SpO2 97%   BMI 31.36 kg/m²     Physical Exam  Constitutional:       General: He is not in acute distress.     Appearance: He is well-developed. He is not ill-appearing or toxic-appearing.   HENT:      Head: Normocephalic and atraumatic.   Eyes:      Conjunctiva/sclera: Conjunctivae normal.   Neck:      Vascular: No carotid bruit.   Cardiovascular:      Rate and Rhythm: Normal rate and regular rhythm.      Heart sounds: Normal heart sounds.   Pulmonary:      Effort: Pulmonary effort is normal.      Breath sounds: Normal breath sounds.   Musculoskeletal:         General: Normal range of motion.      Cervical back: Normal range of motion and neck supple.      Right lower leg: No edema.      Left lower leg: No edema.   Lymphadenopathy:      Cervical: No cervical adenopathy.   Skin:     General: Skin is warm and dry.   Neurological:      General: No focal deficit present.      Mental Status: He is alert and oriented to person, place, and time.      Deep Tendon Reflexes: Reflexes are normal and symmetric.   Psychiatric:         Behavior: Behavior normal.         Thought Content: Thought content normal.         Judgment: Judgment normal.         "

## 2024-07-31 DIAGNOSIS — F41.9 ANXIETY: ICD-10-CM

## 2024-08-01 RX ORDER — CLONAZEPAM 0.5 MG/1
0.5 TABLET ORAL 2 TIMES DAILY PRN
Qty: 45 TABLET | Refills: 0 | Status: SHIPPED | OUTPATIENT
Start: 2024-08-01

## 2024-08-20 ENCOUNTER — TELEMEDICINE (OUTPATIENT)
Dept: BEHAVIORAL/MENTAL HEALTH CLINIC | Facility: CLINIC | Age: 44
End: 2024-08-20
Payer: COMMERCIAL

## 2024-08-20 DIAGNOSIS — F41.1 GAD (GENERALIZED ANXIETY DISORDER): Primary | ICD-10-CM

## 2024-08-20 PROCEDURE — 90832 PSYTX W PT 30 MINUTES: CPT | Performed by: SOCIAL WORKER

## 2024-08-21 NOTE — PSYCH
Behavioral Health Psychotherapy Progress Note    Psychotherapy Provided: Individual Psychotherapy     1. GUCCI (generalized anxiety disorder)            Visit start and stop times:    08/21/24  Start Time: 1300  Stop Time: 1332  Total Visit Time: 32 minutes  Virtual Regular Visit  Therapist met w/pt for individual session.  Symptoms: racing thoughts, sleep difficulties, excessive worry.   Pt shared that he continues to focus on himself and his goals.  Pt shared that he is being mindful of what he eats, exercising, taking time for himself.  Pt shared he has put a hold on the divorce paperwork for now but still doesn't want to get back together with his wife.  Therapist and pt discussed strategies to continue to help him manage his anxiety and racing thoughts especially before bed.      Verification of patient location:    Patient is located at Home in the following state in which I hold an active license [place]      Assessment/Plan: f/u in 4-6 weeks    Problem List Items Addressed This Visit       GUCCI (generalized anxiety disorder) - Primary              Reason for visit is No chief complaint on file.       Encounter provider Lima Davis      Recent Visits  Date Type Provider Dept   08/20/24 Telemedicine [facility] [address]   Showing recent visits within past 7 days and meeting all other requirements  Future Appointments  No visits were found meeting these conditions.  Showing future appointments within next 150 days and meeting all other requirements       The patient was identified by name and date of birth. Baron Lerner was informed that this is a telemedicine visit and that the visit is being conducted throughthe Epic Embedded platform. He agrees to proceed..  My office door was closed. No one else was in the room.  He acknowledged consent and understanding of privacy and security of the video platform. The patient has agreed to participate and understands they can discontinue  the visit at any time.    Patient is aware this is a billable service.     Subjective  Baron Lerner is a 43 y.o. male  .      HPI     Past Medical History:   Diagnosis Date    Anxiety     Hemorrhoids        Past Surgical History:   Procedure Laterality Date    HERNIA REPAIR         Current Outpatient Medications   Medication Sig Dispense Refill    albuterol (PROVENTIL HFA,VENTOLIN HFA) 90 mcg/act inhaler TAKE 2 PUFFS BY MOUTH EVERY 6 HOURS AS NEEDED FOR WHEEZE 8 g 2    albuterol (PROVENTIL HFA,VENTOLIN HFA) 90 mcg/act inhaler TAKE 2 PUFFS BY MOUTH EVERY 6 HOURS AS NEEDED FOR WHEEZE OR FOR SHORTNESS OF BREATH 8.5 g 0    clonazePAM (KlonoPIN) 0.5 mg tablet Take 1 tablet (0.5 mg total) by mouth 2 (two) times a day as needed for anxiety or seizures 45 tablet 0    escitalopram (LEXAPRO) 20 mg tablet TAKE 1 TABLET BY MOUTH EVERY DAY 90 tablet 1    fenofibrate (TRICOR) 145 mg tablet TAKE 1 TABLET BY MOUTH EVERY DAY 90 tablet 1     No current facility-administered medications for this visit.        Allergies   Allergen Reactions    Penicillins Hives       Review of Systems    Video Exam    There were no vitals filed for this visit.    Physical Exam Pt denied any SI/HI/AH/VH

## 2024-08-30 DIAGNOSIS — E78.2 MIXED HYPERLIPIDEMIA: ICD-10-CM

## 2024-08-30 RX ORDER — FENOFIBRATE 145 MG/1
TABLET, COATED ORAL
Qty: 90 TABLET | Refills: 1 | Status: SHIPPED | OUTPATIENT
Start: 2024-08-30

## 2024-09-13 DIAGNOSIS — F41.9 ANXIETY: ICD-10-CM

## 2024-09-16 RX ORDER — CLONAZEPAM 0.5 MG/1
0.5 TABLET ORAL 2 TIMES DAILY PRN
Qty: 45 TABLET | Refills: 0 | Status: SHIPPED | OUTPATIENT
Start: 2024-09-16

## 2024-10-01 ENCOUNTER — TELEMEDICINE (OUTPATIENT)
Dept: BEHAVIORAL/MENTAL HEALTH CLINIC | Facility: CLINIC | Age: 44
End: 2024-10-01
Payer: COMMERCIAL

## 2024-10-01 DIAGNOSIS — F41.1 GAD (GENERALIZED ANXIETY DISORDER): Primary | ICD-10-CM

## 2024-10-01 PROCEDURE — 90834 PSYTX W PT 45 MINUTES: CPT | Performed by: SOCIAL WORKER
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
## 2024-10-20 DIAGNOSIS — F41.9 ANXIETY: ICD-10-CM

## 2024-10-21 RX ORDER — CLONAZEPAM 0.5 MG/1
0.5 TABLET ORAL 2 TIMES DAILY PRN
Qty: 45 TABLET | Refills: 0 | Status: SHIPPED | OUTPATIENT
Start: 2024-10-21

## 2024-10-30 ENCOUNTER — TELEMEDICINE (OUTPATIENT)
Dept: BEHAVIORAL/MENTAL HEALTH CLINIC | Facility: CLINIC | Age: 44
End: 2024-10-30
Payer: COMMERCIAL

## 2024-10-30 DIAGNOSIS — F41.1 GAD (GENERALIZED ANXIETY DISORDER): Primary | ICD-10-CM

## 2024-10-30 PROCEDURE — 90834 PSYTX W PT 45 MINUTES: CPT | Performed by: SOCIAL WORKER

## 2024-11-04 NOTE — PSYCH
"Behavioral Health Psychotherapy Progress Note    Psychotherapy Provided: Individual Psychotherapy     1. GUCCI (generalized anxiety disorder)              Visit start and stop times:    10/30/24  Start Time: 1100  Stop Time: 1140  Total Visit Time: 40 minutes    Virtual Regular Visit    Therapist met w/pt for individual session. Symptoms include: sadness, anger, irritability, excessive worry. He stated that it has been a very eventful week. He shared that his newest dog was sick and he brought her to the vet. He stated that he almost lost her but they were able to save her. He stated that that was added to everyting else that he has been going through. He stated that things are moving forward w/the divorce and it continues to feel \"tense\" at home. Therapist and pt discussed strategies he is trying to implement to help keep the focus on himself and ways to manage his emotions. He stated he has been spending more time w/friends which has been helpful. He continues to go to the gym which has also been a good outlet for him.   Verification of patient location:    Patient is located at Home in the following state in which I hold an active license [place]      Assessment/Plan: f/u in a month    Problem List Items Addressed This Visit       GUCCI (generalized anxiety disorder) - Primary              Reason for visit is No chief complaint on file.       Encounter provider Lima Davis      Recent Visits  Date Type Provider Dept   10/30/24 Telemedicine L[facility] Davis [facility] [address]   Showing recent visits within past 7 days and meeting all other requirements  Future Appointments  No visits were found meeting these conditions.  Showing future appointments within next 150 days and meeting all other requirements       The patient was identified by name and date of birth. Baron Lerner was informed that this is a telemedicine visit and that the visit is being conducted throughthe Epic Embedded platform. He " agrees to proceed..  My office door was closed. No one else was in the room.  He acknowledged consent and understanding of privacy and security of the video platform. The patient has agreed to participate and understands they can discontinue the visit at any time.    Patient is aware this is a billable service.     Subjective  Baron Lerner is a 44 y.o. male  .      HPI     Past Medical History:   Diagnosis Date    Anxiety     Hemorrhoids        Past Surgical History:   Procedure Laterality Date    HERNIA REPAIR         Current Outpatient Medications   Medication Sig Dispense Refill    albuterol (PROVENTIL HFA,VENTOLIN HFA) 90 mcg/act inhaler TAKE 2 PUFFS BY MOUTH EVERY 6 HOURS AS NEEDED FOR WHEEZE 8 g 2    albuterol (PROVENTIL HFA,VENTOLIN HFA) 90 mcg/act inhaler TAKE 2 PUFFS BY MOUTH EVERY 6 HOURS AS NEEDED FOR WHEEZE OR FOR SHORTNESS OF BREATH 8.5 g 0    clonazePAM (KlonoPIN) 0.5 mg tablet Take 1 tablet (0.5 mg total) by mouth 2 (two) times a day as needed for anxiety or seizures 45 tablet 0    escitalopram (LEXAPRO) 20 mg tablet TAKE 1 TABLET BY MOUTH EVERY DAY 90 tablet 1    fenofibrate (TRICOR) 145 mg tablet TAKE 1 TABLET BY MOUTH EVERY DAY 90 tablet 1     No current facility-administered medications for this visit.        Allergies   Allergen Reactions    Penicillins Hives       Review of Systems    Video Exam    There were no vitals filed for this visit.    Physical Exam Pt denied any SI/HI/AH/VH

## 2024-11-22 DIAGNOSIS — F41.9 ANXIETY: ICD-10-CM

## 2024-11-24 RX ORDER — CLONAZEPAM 0.5 MG/1
0.5 TABLET ORAL 2 TIMES DAILY PRN
Qty: 45 TABLET | Refills: 0 | Status: SHIPPED | OUTPATIENT
Start: 2024-11-24

## 2024-11-26 ENCOUNTER — TELEMEDICINE (OUTPATIENT)
Dept: BEHAVIORAL/MENTAL HEALTH CLINIC | Facility: CLINIC | Age: 44
End: 2024-11-26
Payer: COMMERCIAL

## 2024-11-26 DIAGNOSIS — F32.A DEPRESSION, UNSPECIFIED DEPRESSION TYPE: ICD-10-CM

## 2024-11-26 DIAGNOSIS — F41.1 GAD (GENERALIZED ANXIETY DISORDER): Primary | ICD-10-CM

## 2024-11-26 PROCEDURE — 90834 PSYTX W PT 45 MINUTES: CPT | Performed by: SOCIAL WORKER

## 2024-12-02 NOTE — PSYCH
Behavioral Health Psychotherapy Progress Note    Psychotherapy Provided: Individual Psychotherapy     1. GUCCI (generalized anxiety disorder)        2. Depression, unspecified depression type              Visit start and stop times:    11/26/24  Start Time: 1103  Stop Time: 1145  Total Visit Time: 42 minutes  Virtual Regular Visit  Therapist met w/pt for individual session.  Symptoms include: sadness, irritability, excessive worry.  Pt shared that he continues to go through different stages of grief and has been going back and forth in regards to if he is doing the right thing.  However, once he starts thinking about that he reminds himself of all the difficult things that have occurred in there marriage.  Pt shared that he has been physically sick which has prevented him from going to the gym which has affected his mood.  Therapist and pt discussed the importance of pt getting back to the gym when he can due to it helping him both physically and emotionally.    Verification of patient location:    Patient is located at Home in the following state in which I hold an active license [place]      Assessment/Plan: f/u in 4 weeks  Problem List Items Addressed This Visit       GUCCI (generalized anxiety disorder) - Primary     Other Visit Diagnoses         Depression, unspecified depression type                     Reason for visit is No chief complaint on file.       Encounter provider L[facility] Davis      Recent Visits  Date Type Provider Dept   11/26/24 Telemedicine [facility] Davis [facility] [address]   Showing recent visits within past 7 days and meeting all other requirements  Future Appointments  No visits were found meeting these conditions.  Showing future appointments within next 150 days and meeting all other requirements       The patient was identified by name and date of birth. Baron Lerner was informed that this is a telemedicine visit and [facility]. He agrees to proceed..  My office door was closed. No one else was in the room.  He acknowledged consent and understanding of privacy and security of the video platform. The patient has agreed to participate and understands they can discontinue the visit at any time.    Patient is aware this is a billable service.     Subjective  Baron Lerner is a 44 y.o. male  .      HPI     Past Medical History:   Diagnosis Date    Anxiety     Hemorrhoids        Past Surgical History:   Procedure Laterality Date    HERNIA REPAIR         Current Outpatient Medications   Medication Sig Dispense Refill    albuterol (PROVENTIL HFA,VENTOLIN HFA) 90 mcg/act inhaler TAKE 2 PUFFS BY MOUTH EVERY 6 HOURS AS NEEDED FOR WHEEZE 8 g 2    albuterol (PROVENTIL HFA,VENTOLIN HFA) 90 mcg/act inhaler TAKE 2 PUFFS BY MOUTH EVERY 6 HOURS AS NEEDED FOR WHEEZE OR FOR SHORTNESS OF BREATH 8.5 g 0    clonazePAM (KlonoPIN) 0.5 mg tablet Take 1 tablet (0.5 mg total) by mouth 2 (two) times a day as needed for anxiety or seizures 45 tablet 0    escitalopram (LEXAPRO) 20 mg tablet TAKE 1 TABLET BY MOUTH EVERY DAY 90 tablet 1    fenofibrate (TRICOR) 145 mg tablet TAKE 1 TABLET BY MOUTH EVERY DAY 90 tablet 1     No current facility-administered medications for this visit.        Allergies   Allergen Reactions    Penicillins Hives       Review of Systems    Video Exam    There were no vitals filed for this visit.    Physical Exam Pt denied any SI/Hi/Ah/VH

## 2024-12-20 DIAGNOSIS — F41.9 ANXIETY: ICD-10-CM

## 2024-12-23 RX ORDER — CLONAZEPAM 0.5 MG/1
0.5 TABLET ORAL 2 TIMES DAILY PRN
Qty: 45 TABLET | Refills: 0 | Status: SHIPPED | OUTPATIENT
Start: 2024-12-23

## 2025-01-03 ENCOUNTER — TELEMEDICINE (OUTPATIENT)
Dept: BEHAVIORAL/MENTAL HEALTH CLINIC | Facility: CLINIC | Age: 45
End: 2025-01-03
Payer: COMMERCIAL

## 2025-01-03 ENCOUNTER — TELEPHONE (OUTPATIENT)
Dept: PSYCHIATRY | Facility: CLINIC | Age: 45
End: 2025-01-03

## 2025-01-03 DIAGNOSIS — F41.1 GAD (GENERALIZED ANXIETY DISORDER): Primary | ICD-10-CM

## 2025-01-03 PROCEDURE — 90832 PSYTX W PT 30 MINUTES: CPT | Performed by: SOCIAL WORKER

## 2025-01-03 NOTE — PSYCH
Behavioral Health Psychotherapy Progress Note    Psychotherapy Provided: Individual Psychotherapy     1. GUCCI (generalized anxiety disorder)              Behavioral Health Treatment Plan and Discharge Planning: Baron Lerner is aware of and agrees to continue to work on their treatment plan. They have identified and are working toward their discharge goals. yes    Depression Follow-up Plan Completed: Not applicable    Visit start and stop times:    01/03/25  Start Time: 1320  Stop Time: 1350  Total Visit Time: 30 minutes  Virtual Regular Visit  Therapist met w pt for individual session. Symptoms include: irritability, sadness, frustration, loneliness. He shared that the holidays were good but tough. He stated that his ex took the kids for the day which triggered a lot of sadness. He stated that he isn't having any doubts about the divorce but more so sadness about not seeing his kids all the time. Therapist acknowledged and validated pts feelings. Pt shared that he knows he needs to work on himself and engage in things he enjoys. He shared that he is planning on starting back at the gym next week which he knows helps.     Verification of patient location:    Patient is located at Home in the following state in which I hold an [place] license [place]      Assessment/Plan: f/u in one month    Problem List Items Addressed This Visit       GUCCI (generalized anxiety disorder) - Primary     Reason for visit is No chief complaint on file.       Encounter provider L[facility] Davis      Recent Visits  No visits were found meeting these conditions.  Showing recent visits within past 7 days and meeting all other requirements  Today's Visits  Date Type Provider Dept   01/03/25 Telephone [facility]   01/03/25 Telemedicine [facility] Davis [facility] [address]   Showing today's visits and meeting all other requirements  Future Appointments  No visits were found meeting these  conditions.  Showing future appointments within next 150 days and meeting all other requirements       The patient was identified by name and date of birth. Baron Lerner was informed that this is a telemedicine visit and that the visit is being conducted throughthe Epic Embedded platform. He agrees to proceed..  My office door was closed. No one else was in the room.  He acknowledged consent and understanding of privacy and security of the video platform. The patient has agreed to participate and understands they can discontinue the visit at any time.    Patient is aware this is a billable service.     Subjective  Baron Lerner is a 44 y.o. male  .      HPI     Past Medical History:   Diagnosis Date    Anxiety     Hemorrhoids        Past Surgical History:   Procedure Laterality Date    HERNIA REPAIR         Current Outpatient Medications   Medication Sig Dispense Refill    albuterol (PROVENTIL HFA,VENTOLIN HFA) 90 mcg/act inhaler TAKE 2 PUFFS BY MOUTH EVERY 6 HOURS AS NEEDED FOR WHEEZE 8 g 2    albuterol (PROVENTIL HFA,VENTOLIN HFA) 90 mcg/act inhaler TAKE 2 PUFFS BY MOUTH EVERY 6 HOURS AS NEEDED FOR WHEEZE OR FOR SHORTNESS OF BREATH 8.5 g 0    clonazePAM (KlonoPIN) 0.5 mg tablet Take 1 tablet (0.5 mg total) by mouth 2 (two) times a day as needed for anxiety or seizures 45 tablet 0    escitalopram (LEXAPRO) 20 mg tablet TAKE 1 TABLET BY MOUTH EVERY DAY 90 tablet 1    fenofibrate (TRICOR) 145 mg tablet TAKE 1 TABLET BY MOUTH EVERY DAY 90 tablet 1     No current facility-administered medications for this visit.        Allergies   Allergen Reactions    Penicillins Hives       Review of Systems    Video Exam    There were no vitals filed for this visit.    Physical Exam Pt denied any SI/HI/AH/VH

## 2025-01-06 NOTE — BH TREATMENT PLAN
"Outpatient Behavioral Health Psychotherapy Treatment Plan    Baron Yann  1980     Date of Initial Psychotherapy Assessment: 1/3/25   Date of Current Treatment Plan: 01/03/25  Treatment Plan Target Date: 5/3/25  Treatment Plan Expiration Date: TBD    Diagnosis:   1. GUCCI (generalized anxiety disorder)            Area(s) of Need: supports, coping skills    Long Term Goal 1 (in the client's own words): \"I want to better manage my anxiety.\"    Stage of Change: Preparation    Target Date for completion: TBD     Anticipated therapeutic modalities: individual     People identified to complete this goal: pt      Objective 1: (identify the means of measuring success in meeting the objective): pt will go to the gym 3x/week      Objective 2: (identify the means of measuring success in meeting the objective): pt will identify and implement 3 or more coping skills.      I am currently under the care of a [facility] psychiatric provider: no    My [facility] psychiatric provider is: PCP    I am currently taking psychiatric medications: Yes, as prescribed    I feel that I will be ready for discharge from mental health care when I reach the following (measurable goal/objective): \"I am not sure.\"    For children and adults who have a legal guardian:   Has there been any change to custody orders and/or guardianship status? NA. If yes, attach updated documentation.    I have created my Crisis Plan and have been offered a copy of this plan    Behavioral Health Treatment Plan [facility]: Diagnosis and Treatment Plan explained to Baron Lerner Baron Lerner acknowledges an understanding of their diagnosis. Baron Lerner agrees to this treatment plan.    I have been offered a copy of this Treatment Plan. Yes    Baron Lerner, 1980, actively participated in the review and update of this treatment plan during a virtual session, using the Epic Embedded platform.   Baron Lerner  provided verbal consent on 1/3/2025 " at 1:25  PM. The treatment plan was transcribed into the Electronic Health Record at a later time.

## 2025-01-16 DIAGNOSIS — F41.1 GAD (GENERALIZED ANXIETY DISORDER): ICD-10-CM

## 2025-01-16 RX ORDER — ESCITALOPRAM OXALATE 20 MG/1
20 TABLET ORAL DAILY
Qty: 90 TABLET | Refills: 1 | Status: SHIPPED | OUTPATIENT
Start: 2025-01-16

## 2025-01-21 DIAGNOSIS — F41.9 ANXIETY: ICD-10-CM

## 2025-01-21 DIAGNOSIS — B34.9 VIRAL INFECTION, UNSPECIFIED: ICD-10-CM

## 2025-01-21 RX ORDER — CLONAZEPAM 0.5 MG/1
0.5 TABLET ORAL 2 TIMES DAILY PRN
Qty: 45 TABLET | Refills: 0 | Status: SHIPPED | OUTPATIENT
Start: 2025-01-21

## 2025-01-21 RX ORDER — ALBUTEROL SULFATE 90 UG/1
INHALANT RESPIRATORY (INHALATION)
Qty: 8.5 G | Refills: 2 | Status: SHIPPED | OUTPATIENT
Start: 2025-01-21

## 2025-02-03 ENCOUNTER — TELEMEDICINE (OUTPATIENT)
Dept: BEHAVIORAL/MENTAL HEALTH CLINIC | Facility: CLINIC | Age: 45
End: 2025-02-03
Payer: COMMERCIAL

## 2025-02-03 DIAGNOSIS — F41.1 GAD (GENERALIZED ANXIETY DISORDER): Primary | ICD-10-CM

## 2025-02-03 PROCEDURE — 90832 PSYTX W PT 30 MINUTES: CPT | Performed by: SOCIAL WORKER

## 2025-02-03 NOTE — PSYCH
"Behavioral Health Psychotherapy Progress Note    Psychotherapy Provided: Individual Psychotherapy     1. GUCCI (generalized anxiety disorder)            Behavioral Health Treatment Plan and Discharge Planning: Baron Lerner is aware of and agrees to continue to work on their treatment plan. They have identified and are working toward their discharge goals. yes    Depression Follow-up Plan Completed: Not applicable    Visit start and stop times:    02/03/25  Start Time: 1300  Stop Time: 1322  Total Visit Time: 22 minutes  Virtual Regular Visit  Therapist met w/pt for individual session.  Symptoms include: racing thoughts, lack of energy/motivation, fatigue.  He stated that things are \"the same.\"  He stated that the  are working out the financial pieces of the divorce.  He stated he continues to stay at the house with his wife and kids which has been good and bad.  Therapist and pt discussed that he tries to spend as much time with his kids as possible.  Pt shared that work has been the biggest stressor for him right now.  Therapist and pt discussed different strategies to help manage his work stress.  Therapist encouraged pt to get back to the gym due to it helping with his stress in the past.    Verification of patient location:    Patient is located at Home in the following state in which I hold an active license [place]      Assessment/Plan: f/u in one month    Problem List Items Addressed This Visit       GUCCI (generalized anxiety disorder) - Primary           Reason for visit is No chief complaint on file.       Encounter provider Lima Davis      Recent Visits  No visits were found meeting these conditions.  Showing recent visits within past 7 days and meeting all other requirements  Today's Visits  Date Type Provider Dept   02/03/25 Telemedicine Lima Davis [facility] [address]   Showing today's visits and meeting all other requirements  Future Appointments  No visits were found " meeting these conditions.  Showing future appointments within next 150 days and meeting all other requirements       The patient was identified by name and date of birth. Baron Lerner was informed that this is a telemedicine visit and that the visit is being conducted throughthe Epic Embedded platform. He agrees to proceed..  My office door was closed. No one else was in the room.  He acknowledged consent and understanding of privacy and security of the video platform. The patient has agreed to participate and understands they can discontinue the visit at any time.    Patient is aware this is a billable service.     Subjective  Baron Lerner is a 44 y.o. male  .      HPI     Past Medical History:   Diagnosis Date    Anxiety     Hemorrhoids        Past Surgical History:   Procedure Laterality Date    HERNIA REPAIR         Current Outpatient Medications   Medication Sig Dispense Refill    albuterol (PROVENTIL HFA,VENTOLIN HFA) 90 mcg/act inhaler TAKE 2 PUFFS BY MOUTH EVERY 6 HOURS AS NEEDED FOR WHEEZE 8 g 2    albuterol (PROVENTIL HFA,VENTOLIN HFA) 90 mcg/act inhaler TAKE 2 PUFFS BY MOUTH EVERY 6 HOURS AS NEEDED FOR WHEEZE OR FOR SHORTNESS OF BREATH 8.5 g 2    clonazePAM (KlonoPIN) 0.5 mg tablet Take 1 tablet (0.5 mg total) by mouth 2 (two) times a day as needed for anxiety or seizures 45 tablet 0    escitalopram (LEXAPRO) 20 mg tablet TAKE 1 TABLET BY MOUTH EVERY DAY 90 tablet 1    fenofibrate (TRICOR) 145 mg tablet TAKE 1 TABLET BY MOUTH EVERY DAY 90 tablet 1     No current facility-administered medications for this visit.        Allergies   Allergen Reactions    Penicillins Hives       Review of Systems    Video Exam    There were no vitals filed for this visit.    Physical Exam Pt denied any SI/Hi/Ah/VH

## 2025-02-08 ENCOUNTER — APPOINTMENT (OUTPATIENT)
Age: 45
End: 2025-02-08
Payer: COMMERCIAL

## 2025-02-08 DIAGNOSIS — E78.2 MIXED HYPERLIPIDEMIA: ICD-10-CM

## 2025-02-08 LAB
ALBUMIN SERPL BCG-MCNC: 4.7 G/DL (ref 3.5–5)
ALP SERPL-CCNC: 34 U/L (ref 34–104)
ALT SERPL W P-5'-P-CCNC: 16 U/L (ref 7–52)
ANION GAP SERPL CALCULATED.3IONS-SCNC: 10 MMOL/L (ref 4–13)
AST SERPL W P-5'-P-CCNC: 19 U/L (ref 13–39)
BILIRUB SERPL-MCNC: 0.51 MG/DL (ref 0.2–1)
BUN SERPL-MCNC: 13 MG/DL (ref 5–25)
CALCIUM SERPL-MCNC: 9.7 MG/DL (ref 8.4–10.2)
CHLORIDE SERPL-SCNC: 104 MMOL/L (ref 96–108)
CHOLEST SERPL-MCNC: 186 MG/DL (ref ?–200)
CO2 SERPL-SCNC: 28 MMOL/L (ref 21–32)
CREAT SERPL-MCNC: 1.07 MG/DL (ref 0.6–1.3)
GFR SERPL CREATININE-BSD FRML MDRD: 85 ML/MIN/1.73SQ M
GLUCOSE P FAST SERPL-MCNC: 101 MG/DL (ref 65–99)
HDLC SERPL-MCNC: 50 MG/DL
LDLC SERPL CALC-MCNC: 120 MG/DL (ref 0–100)
POTASSIUM SERPL-SCNC: 3.9 MMOL/L (ref 3.5–5.3)
PROT SERPL-MCNC: 7.3 G/DL (ref 6.4–8.4)
SODIUM SERPL-SCNC: 142 MMOL/L (ref 135–147)
TRIGL SERPL-MCNC: 81 MG/DL (ref ?–150)

## 2025-02-08 PROCEDURE — 80053 COMPREHEN METABOLIC PANEL: CPT

## 2025-02-08 PROCEDURE — 36415 COLL VENOUS BLD VENIPUNCTURE: CPT

## 2025-02-08 PROCEDURE — 80061 LIPID PANEL: CPT

## 2025-02-10 ENCOUNTER — OFFICE VISIT (OUTPATIENT)
Dept: FAMILY MEDICINE CLINIC | Facility: CLINIC | Age: 45
End: 2025-02-10
Payer: COMMERCIAL

## 2025-02-10 VITALS
DIASTOLIC BLOOD PRESSURE: 74 MMHG | OXYGEN SATURATION: 99 % | SYSTOLIC BLOOD PRESSURE: 126 MMHG | BODY MASS INDEX: 29.41 KG/M2 | HEART RATE: 78 BPM | HEIGHT: 67 IN | WEIGHT: 187.4 LBS | TEMPERATURE: 97.6 F

## 2025-02-10 DIAGNOSIS — J45.909 EXTRINSIC ASTHMA WITHOUT COMPLICATION, UNSPECIFIED ASTHMA SEVERITY, UNSPECIFIED WHETHER PERSISTENT: Primary | ICD-10-CM

## 2025-02-10 DIAGNOSIS — F41.9 ANXIETY: ICD-10-CM

## 2025-02-10 DIAGNOSIS — F41.1 GAD (GENERALIZED ANXIETY DISORDER): ICD-10-CM

## 2025-02-10 PROCEDURE — 99214 OFFICE O/P EST MOD 30 MIN: CPT | Performed by: FAMILY MEDICINE

## 2025-02-10 RX ORDER — CLONAZEPAM 1 MG/1
1 TABLET ORAL 2 TIMES DAILY PRN
Qty: 60 TABLET | Refills: 0 | Status: SHIPPED | OUTPATIENT
Start: 2025-02-10

## 2025-02-10 NOTE — PROGRESS NOTES
Name: Baron Lerner      : 1980      MRN: [id number]  Encounter Provider: FEROZ Belle  Encounter Date: 2/10/2025   Encounter department: [facility] [address]  :  Assessment & Plan  Anxiety    Orders:    clonazePAM (KlonoPIN) 1 mg tablet; Take 1 tablet (1 mg total) by mouth 2 (two) times a day as needed for anxiety or seizures    Extrinsic asthma without complication, unspecified asthma severity, unspecified whether persistent  Stable not in exacerbation step plan of care       GUCCI (generalized anxiety disorder)  Discussed issues of concern, reviewed medications discussed dosing side effects warnings and cautions at length, we will temporarily increase Klonopin continue care with therapist              Depression Screening and Follow-up Plan: Patient was screened for depression during today's encounter. They screened negative with a PHQ-2 score of 2.      History of Present Illness    F/u   Still in process of divorce , living with spouse   High stress when at home   Promotion at work  Continue with therapist   Medications continues with Lexapro, feels the need to eat increase clonazepam temporarily  Exercise most days of the week , gym   Neg etoh, smoke            Review of Systems   Constitutional:  Negative for appetite change, chills, fever and unexpected weight change.   HENT:  Negative for congestion, dental problem, ear pain, hearing loss, postnasal drip, rhinorrhea, sinus pressure, sinus pain, sneezing, sore throat, tinnitus and voice change.    Eyes:  Negative for visual disturbance.   Respiratory:  Negative for apnea, cough, chest tightness and shortness of breath.    Cardiovascular:  Negative for chest pain, palpitations and leg swelling.   Gastrointestinal:  Negative for abdominal pain, blood in stool, constipation, diarrhea, nausea and vomiting.   Endocrine: Negative for cold intolerance, heat intolerance, polydipsia, polyphagia and polyuria.  "  Genitourinary:  Negative for decreased urine volume, difficulty urinating, dysuria, frequency and hematuria.   Musculoskeletal:  Negative for arthralgias, back pain, gait problem, joint swelling and myalgias.   Skin:  Negative for color change, rash and wound.   Allergic/Immunologic: Negative for environmental allergies and food allergies.   Neurological:  Negative for dizziness, syncope, weakness, light-headedness, numbness and headaches.   Hematological:  Negative for adenopathy. Does not bruise/bleed easily.   Psychiatric/Behavioral:  Negative for sleep disturbance and suicidal ideas. The patient is not nervous/anxious.        Objective   /74 (BP Location: Left arm, Patient Position: Sitting)   Pulse 78   Temp 97.6 °F (36.4 °C)   Ht 5' 7\" (1.702 m)   Wt 85 kg (187 lb 6.4 oz)   SpO2 99%   BMI 29.35 kg/m²      Physical Exam  Constitutional:       General: He is not in acute distress.     Appearance: He is well-developed. He is not ill-appearing or toxic-appearing.   HENT:      Head: Normocephalic and atraumatic.      Right Ear: Tympanic membrane normal.      Left Ear: Tympanic membrane normal.      Nose: No congestion.   Eyes:      Conjunctiva/sclera: Conjunctivae normal.   Neck:      Vascular: No carotid bruit.   Cardiovascular:      Rate and Rhythm: Normal rate and regular rhythm.      Heart sounds: Normal heart sounds.   Pulmonary:      Effort: Pulmonary effort is normal.      Breath sounds: Normal breath sounds.   Musculoskeletal:         General: Normal range of motion.      Cervical back: Normal range of motion and neck supple.      Right lower leg: No edema.      Left lower leg: No edema.   Lymphadenopathy:      Cervical: No cervical adenopathy.   Skin:     General: Skin is warm and dry.      Findings: No rash.   Neurological:      Mental Status: He is alert and oriented to person, place, and time.      Deep Tendon Reflexes: Reflexes are normal and symmetric.   Psychiatric:         Attention " and Perception: Attention normal. He is attentive. He does not perceive auditory hallucinations.         Mood and Affect: Mood and affect normal. Mood is not anxious or depressed.         Speech: Speech normal. Speech is not rapid and pressured.         Behavior: Behavior normal.         Thought Content: Thought content normal.         Cognition and Memory: Cognition normal.         Judgment: Judgment normal.      Comments: Positive eye contact, informed to self conversational

## 2025-02-10 NOTE — ASSESSMENT & PLAN NOTE
Discussed issues of concern, reviewed medications discussed dosing side effects warnings and cautions at length, we will temporarily increase Klonopin continue care with therapist

## 2025-02-10 NOTE — ASSESSMENT & PLAN NOTE
Orders:    clonazePAM (KlonoPIN) 1 mg tablet; Take 1 tablet (1 mg total) by mouth 2 (two) times a day as needed for anxiety or seizures

## 2025-03-10 ENCOUNTER — TELEMEDICINE (OUTPATIENT)
Dept: BEHAVIORAL/MENTAL HEALTH CLINIC | Facility: CLINIC | Age: 45
End: 2025-03-10
Payer: COMMERCIAL

## 2025-03-10 DIAGNOSIS — F41.1 GAD (GENERALIZED ANXIETY DISORDER): Primary | ICD-10-CM

## 2025-03-10 PROCEDURE — 90832 PSYTX W PT 30 MINUTES: CPT | Performed by: SOCIAL WORKER

## 2025-03-13 NOTE — PSYCH
"Behavioral Health Psychotherapy Progress Note    Psychotherapy Provided: Individual Psychotherapy     1. GUCCI (generalized anxiety disorder)            DATA: Therapist met w/pt for individual session.  Pt stated he has been having a hard time and today he decided to stay home. He shared that he got more information in regards to the divorce and feels \"defeated.\" He stated that he has no problem paying for things for his kids but with 50/50 custody and the amount he would have to pay his wife he doesn't know if he will be able to live with the amount of money he has left.  He stated that he wants to be able to provide for his kids and also wants to be there for them and not let this divorce affect them. Pt stated that still doesn't think his kids knows about it and isn't ready to talk to them.  He stated that he keeps going back and forth in his head about the divorce.  He shared that he doesn't love his wife and doesn't trust her but is very worried about how this is going to affect his kids.  Therapist continued to actively listened and support pt in making the best decision for him and his family.      During this session, this clinician used the following therapeutic modalities: Cognitive Behavioral Therapy    Substance Abuse was not addressed during this session. If the client is diagnosed with a co-occurring substance use disorder, please indicate any changes in the frequency or amount of use: n/a. Stage of change for addressing substance use diagnoses: No substance use/Not applicable    ASSESSMENT:  Baron Lerner presents with a Anxious mood.     his affect is Normal range and intensity, which is congruent, with his mood and the content of the session. The client has made progress on their goals.     Baron Lerner presents with a none risk of suicide, none risk of self-harm, and none risk of harm to others.    For any risk assessment that surpasses a \"low\" rating, a safety plan must be developed.    A " safety plan was indicated: no  If yes, describe in detail n/a    PLAN: Between sessions, Baron Lerner will continue to work on identifying pros/cons of his current situation. At the next session, the therapist will use Cognitive Behavioral Therapy to address symptoms of anxiety.    Behavioral Health Treatment Plan and Discharge Planning: Baron Lerner is aware of and agrees to continue to work on their treatment plan. They have identified and are working toward their discharge goals. yes    Depression Follow-up Plan Completed: Not applicable    Visit start and stop times:    03/10/25  Start Time: 1300  Stop Time: 1335  Total Visit Time: 35 minutes  Virtual Regular Visit    Verification of patient location:    Patient is located at Home in the following state in which I hold an active license [place]      Assessment/Plan:    Problem List Items Addressed This Visit       GUCCI (generalized anxiety disorder) - Primary         Reason for visit is No chief complaint on file.       Encounter provider Lima Davis      Recent Visits  Date Type Provider Dept   03/10/25 Telemedicine [facility] [address]   Showing recent visits within past 7 days and meeting all other requirements  Future Appointments  No visits were found meeting these conditions.  Showing future appointments within next 150 days and meeting all other requirements       The patient was identified by name and date of birth. Baron Lerner was informed that this is a telemedicine visit and that the visit is being conducted throughthe Epic Embedded platform. He agrees to proceed..  My office door was closed. No one else was in the room.  He acknowledged consent and understanding of privacy and security of the video platform. The patient has agreed to participate and understands they can discontinue the visit at any time.    Patient is aware this is a billable service.     Subjective  Baron Lerner is a 44 y.o. male   .      HPI     Past Medical History:   Diagnosis Date    Anxiety     Hemorrhoids        Past Surgical History:   Procedure Laterality Date    HERNIA REPAIR         Current Outpatient Medications   Medication Sig Dispense Refill    albuterol (PROVENTIL HFA,VENTOLIN HFA) 90 mcg/act inhaler TAKE 2 PUFFS BY MOUTH EVERY 6 HOURS AS NEEDED FOR WHEEZE 8 g 2    albuterol (PROVENTIL HFA,VENTOLIN HFA) 90 mcg/act inhaler TAKE 2 PUFFS BY MOUTH EVERY 6 HOURS AS NEEDED FOR WHEEZE OR FOR SHORTNESS OF BREATH 8.5 g 2    clonazePAM (KlonoPIN) 1 mg tablet Take 1 tablet (1 mg total) by mouth 2 (two) times a day as needed for anxiety or seizures 60 tablet 0    escitalopram (LEXAPRO) 20 mg tablet TAKE 1 TABLET BY MOUTH EVERY DAY 90 tablet 1    fenofibrate (TRICOR) 145 mg tablet TAKE 1 TABLET BY MOUTH EVERY DAY 90 tablet 1     No current facility-administered medications for this visit.        Allergies   Allergen Reactions    Penicillins Hives       Review of Systems    Video Exam    There were no vitals filed for this visit.    Physical Exam Pt denied any SI/Hi/Ah/VH

## 2025-03-27 DIAGNOSIS — F41.9 ANXIETY: ICD-10-CM

## 2025-03-31 RX ORDER — CLONAZEPAM 1 MG/1
1 TABLET ORAL 2 TIMES DAILY PRN
Qty: 60 TABLET | Refills: 0 | Status: SHIPPED | OUTPATIENT
Start: 2025-03-31

## 2025-04-04 DIAGNOSIS — E78.2 MIXED HYPERLIPIDEMIA: ICD-10-CM

## 2025-04-04 RX ORDER — FENOFIBRATE 145 MG/1
145 TABLET, COATED ORAL DAILY
Qty: 90 TABLET | Refills: 1 | Status: SHIPPED | OUTPATIENT
Start: 2025-04-04

## 2025-04-07 ENCOUNTER — TELEMEDICINE (OUTPATIENT)
Dept: BEHAVIORAL/MENTAL HEALTH CLINIC | Facility: CLINIC | Age: 45
End: 2025-04-07
Payer: COMMERCIAL

## 2025-04-07 DIAGNOSIS — F41.1 GAD (GENERALIZED ANXIETY DISORDER): Primary | ICD-10-CM

## 2025-04-07 PROCEDURE — 90832 PSYTX W PT 30 MINUTES: CPT | Performed by: SOCIAL WORKER

## 2025-04-09 NOTE — PSYCH
Virtual Regular VisitName: Baron Lerner      : 1980      MRN: [id number]  Encounter Provider: Lima Davis  Encounter Date: 2025   Encounter department: [facility] [address]  :  Assessment & Plan  GUCCI (generalized anxiety disorder)               DATA: The therapist met with the patient for an individual session. The patient's symptoms included irritability, hopelessness, fatigue, frustration, rumination, and racing thoughts. Stressors include ongoing divorce negotiations and upcoming travel to [place] for work.  The therapist and pt discussed the effects of his recent medication increase. Therapist noted that pt appeared to be in a better space when he reported weight loss through healthy food choices, working out regularly at the gym, and enjoying the time he spent with his children and nephew over the weekend.  Therapist reassured pt regarding his concerns about how the divorce could affect his young children. The therapist acknowledged several factors involved and how pt can help ease the transition for his children.  Therapist praised pt for being able to control his anger at work. Therapist noted that pt getting his “really good” raise and bonus shows how well he is managing his work stress responses.    During this session, this clinician used the following therapeutic modalities: Cognitive Behavioral Therapy and Motivational Interviewing    Substance Abuse was not addressed during this session. If the client is diagnosed with a co-occurring substance use disorder, please indicate any changes in the frequency or amount of use: unknown. Stage of change for addressing substance use diagnoses: No substance use/Not applicable    ASSESSMENT:  Baron presents with a Euthymic/ normal mood. Baron's affect is Normal range and intensity, which is congruent, with their mood and the content of the session. The client has made progress on their goals as  "evidenced by pts self report, improved symptoms.    Baron presents with a none risk of suicide, none risk of self-harm, and none risk of harm to others.    For any risk assessment that surpasses a \"low\" rating, a safety plan must be developed.    A safety plan was indicated: no  If yes, describe in detail n/a    PLAN: Between sessions, Baron will continue to work on challenging his anxious thoughts and engaging in self care. At the next session, the therapist will use Cognitive Behavioral Therapy to address symptoms of anxiety.    Behavioral Health Treatment Plan [facility]: Diagnosis and Treatment Plan explained to Baron, Baron relates understanding diagnosis and is agreeable to Treatment Plan. Yes     Depression Follow-up Plan Completed: Not applicable     Reason for visit is No chief complaint on file.     Recent Visits  Date Type Provider Dept   04/07/25 Telemedicine [facility] Davis [facility] [address]   Showing recent visits within past 7 days and meeting all other requirements  Future Appointments  No visits were found meeting these conditions.  Showing future appointments within next 150 days and meeting all other requirements     History of Present Illness     HPI    Past Medical History   Past Medical History:   Diagnosis Date    Anxiety     Hemorrhoids      Past Surgical History:   Procedure Laterality Date    HERNIA REPAIR       Current Outpatient Medications   Medication Instructions    albuterol (PROVENTIL HFA,VENTOLIN HFA) 90 mcg/act inhaler TAKE 2 PUFFS BY MOUTH EVERY 6 HOURS AS NEEDED FOR WHEEZE    albuterol (PROVENTIL HFA,VENTOLIN HFA) 90 mcg/act inhaler TAKE 2 PUFFS BY MOUTH EVERY 6 HOURS AS NEEDED FOR WHEEZE OR FOR SHORTNESS OF BREATH    clonazePAM (KLONOPIN) 1 mg, Oral, 2 times daily PRN    escitalopram (LEXAPRO) 20 mg, Oral, Daily    fenofibrate (TRICOR) 145 mg, Oral, Daily     Allergies   Allergen Reactions    Penicillins Hives       Objective   There were no vitals " taken for this visit.    Video Exam  Physical Exam     Administrative Statements   Encounter provider Lima Davis    The Patient is located at Home and in the following state in which I hold an active license [place].    The patient was identified by name and date of birth. Baron Lerner was informed that this is a telemedicine visit and that the visit is being conducted through the Epic Embedded platform. He agrees to proceed..  My office door was closed. No one else was in the room.  He acknowledged consent and understanding of privacy and security of the video platform. The patient has agreed to participate and understands they can discontinue the visit at any time.        Visit Time  Start Time: 1300  Stop Time: 1330  Total Visit Time: 30 minutes

## 2025-04-11 ENCOUNTER — OFFICE VISIT (OUTPATIENT)
Dept: FAMILY MEDICINE CLINIC | Facility: CLINIC | Age: 45
End: 2025-04-11
Payer: COMMERCIAL

## 2025-04-11 VITALS
BODY MASS INDEX: 27.78 KG/M2 | OXYGEN SATURATION: 99 % | HEIGHT: 67 IN | DIASTOLIC BLOOD PRESSURE: 72 MMHG | WEIGHT: 177 LBS | HEART RATE: 75 BPM | SYSTOLIC BLOOD PRESSURE: 124 MMHG

## 2025-04-11 DIAGNOSIS — B34.9 VIRAL INFECTION, UNSPECIFIED: ICD-10-CM

## 2025-04-11 DIAGNOSIS — T14.8XXA OPEN WOUND OF SKIN: ICD-10-CM

## 2025-04-11 DIAGNOSIS — R21 RASH: Primary | ICD-10-CM

## 2025-04-11 PROCEDURE — 99214 OFFICE O/P EST MOD 30 MIN: CPT

## 2025-04-11 RX ORDER — FAMOTIDINE 40 MG/1
40 TABLET, FILM COATED ORAL DAILY
Qty: 30 TABLET | Refills: 1 | Status: SHIPPED | OUTPATIENT
Start: 2025-04-11

## 2025-04-11 RX ORDER — DOXYCYCLINE 100 MG/1
100 CAPSULE ORAL EVERY 12 HOURS SCHEDULED
Qty: 20 CAPSULE | Refills: 0 | Status: SHIPPED | OUTPATIENT
Start: 2025-04-11 | End: 2025-04-21

## 2025-04-11 RX ORDER — MUPIROCIN 20 MG/G
OINTMENT TOPICAL 3 TIMES DAILY
Qty: 30 G | Refills: 1 | Status: SHIPPED | OUTPATIENT
Start: 2025-04-11

## 2025-04-11 RX ORDER — CETIRIZINE HYDROCHLORIDE 1 MG/ML
10 SOLUTION ORAL 2 TIMES DAILY
Qty: 236 ML | Refills: 1 | Status: SHIPPED | OUTPATIENT
Start: 2025-04-11

## 2025-04-11 RX ORDER — PREDNISONE 10 MG/1
TABLET ORAL
Qty: 30 TABLET | Refills: 0 | Status: SHIPPED | OUTPATIENT
Start: 2025-04-11 | End: 2025-04-20

## 2025-04-11 RX ORDER — AZELASTINE 1 MG/ML
1 SPRAY, METERED NASAL 2 TIMES DAILY
Qty: 30 ML | Refills: 1 | Status: SHIPPED | OUTPATIENT
Start: 2025-04-11

## 2025-04-11 RX ORDER — HYDROCORTISONE 25 MG/G
CREAM TOPICAL 2 TIMES DAILY
Qty: 28 G | Refills: 1 | Status: SHIPPED | OUTPATIENT
Start: 2025-04-11

## 2025-04-11 RX ORDER — ALBUTEROL SULFATE 90 UG/1
INHALANT RESPIRATORY (INHALATION)
Qty: 6.7 G | Refills: 2 | Status: SHIPPED | OUTPATIENT
Start: 2025-04-11

## 2025-04-11 NOTE — PROGRESS NOTES
Name: Baron Lerner      : 1980      MRN: [id number]  Encounter Provider: FEROZ Miller  Encounter Date: 2025   Encounter department: [facility] [address]  :  Assessment & Plan  Rash  Prednisone taper, recommend topical hydrocortisone, will start Pepcid at nighttime and double Zyrtec dose.  Follow-up as needed  Orders:  •  predniSONE 10 mg tablet; Take 5 tablets (50 mg total) by mouth daily for 2 days, THEN 4 tablets (40 mg total) daily for 2 days, THEN 3 tablets (30 mg total) daily for 2 days, THEN 2 tablets (20 mg total) daily for 2 days, THEN 1 tablet (10 mg total) daily for 2 days.  •  azelastine (ASTELIN) 0.1 % nasal spray; 1 spray into each nostril 2 (two) times a day Use in each nostril as directed  •  hydrocortisone 2.5 % cream; Apply topically 2 (two) times a day  •  famotidine (PEPCID) 40 MG tablet; Take 1 tablet (40 mg total) by mouth daily  •  cetirizine (ZyrTEC) oral solution; Take 10 mL (10 mg total) by mouth 2 (two) times a day    Open wound of skin  Will do doxycycline and mupirocin up to 3 times a day would recommend not wearing a belt until lesions are healed follow-up as needed  Orders:  •  mupirocin (BACTROBAN) 2 % ointment; Apply topically 3 (three) times a day  •  doxycycline hyclate (VIBRAMYCIN) 100 mg capsule; Take 1 capsule (100 mg total) by mouth every 12 (twelve) hours for 10 days           History of Present Illness   Braon is here for rash on bilateral arms. He is just getting over a uri, Rash started Monday, he is not taking any new medications, no new exposure at work or home. Does have dog that has been outside. He does have a lot of allergies.     Urticaria  Associated symptoms include congestion, coughing, diarrhea, fatigue and a fever. Pertinent negatives include no shortness of breath, sore throat or vomiting.     Review of Systems   Constitutional:  Positive for chills, diaphoresis, fatigue and fever.   HENT:   "Positive for congestion, sinus pressure and sinus pain. Negative for ear pain and sore throat.    Respiratory:  Positive for cough and wheezing. Negative for chest tightness and shortness of breath.    Gastrointestinal:  Positive for diarrhea. Negative for abdominal pain, nausea and vomiting.   Musculoskeletal:  Positive for arthralgias and myalgias. Negative for back pain.   Skin:  Positive for rash.   Neurological:  Negative for headaches.   All other systems reviewed and are negative.      Objective   /72   Pulse 75   Ht 5' 7\" (1.702 m)   Wt 80.3 kg (177 lb)   SpO2 99%   BMI 27.72 kg/m²      Physical Exam  Vitals and nursing note reviewed.   Constitutional:       General: He is not in acute distress.     Appearance: Normal appearance. He is well-developed. He is not ill-appearing.   HENT:      Head: Normocephalic and atraumatic.      Right Ear: Tympanic membrane, ear canal and external ear normal. There is no impacted cerumen.      Left Ear: Tympanic membrane, ear canal and external ear normal. There is no impacted cerumen.      Nose: Nose normal. No congestion.      Mouth/Throat:      Mouth: Mucous membranes are moist.      Pharynx: Posterior oropharyngeal erythema present. No oropharyngeal exudate.   Eyes:      Extraocular Movements: Extraocular movements intact.      Conjunctiva/sclera: Conjunctivae normal.      Pupils: Pupils are equal, round, and reactive to light.   Cardiovascular:      Rate and Rhythm: Normal rate and regular rhythm.      Heart sounds: No murmur heard.  Pulmonary:      Effort: Pulmonary effort is normal. No respiratory distress.      Breath sounds: Normal breath sounds.   Abdominal:      Palpations: Abdomen is soft.      Tenderness: There is no abdominal tenderness.   Musculoskeletal:         General: No swelling.      Cervical back: Normal range of motion and neck supple.      Right lower leg: No edema.      Left lower leg: No edema.   Lymphadenopathy:      Cervical: No " cervical adenopathy.   Skin:     General: Skin is warm and dry.      Capillary Refill: Capillary refill takes less than 2 seconds.      Findings: Rash present.   Neurological:      General: No focal deficit present.      Mental Status: He is alert and oriented to person, place, and time.   Psychiatric:         Mood and Affect: Mood normal.         Behavior: Behavior normal.

## 2025-05-03 DIAGNOSIS — R21 RASH: ICD-10-CM

## 2025-05-05 ENCOUNTER — TELEMEDICINE (OUTPATIENT)
Dept: BEHAVIORAL/MENTAL HEALTH CLINIC | Facility: CLINIC | Age: 45
End: 2025-05-05
Payer: COMMERCIAL

## 2025-05-05 DIAGNOSIS — F41.1 GAD (GENERALIZED ANXIETY DISORDER): Primary | ICD-10-CM

## 2025-05-05 PROCEDURE — 90834 PSYTX W PT 45 MINUTES: CPT | Performed by: SOCIAL WORKER

## 2025-05-05 RX ORDER — FAMOTIDINE 40 MG/1
40 TABLET, FILM COATED ORAL DAILY
Qty: 90 TABLET | Refills: 1 | Status: SHIPPED | OUTPATIENT
Start: 2025-05-05

## 2025-05-06 NOTE — PSYCH
"Virtual Regular VisitName: Baron Lerner      : 1980      MRN: [id number]  Encounter Provider: Limafrank Davis  Encounter Date: 2025   Encounter department: [facility] [address]  :  Assessment & Plan  GUCCI (generalized anxiety disorder)             DATA: Therapist met w/pt for individual session.  Symptoms include: stress, anxiety, worry, racing thoughts.  Pt shared that he did end up going to [place] for work and the plane ride was difficult like he expected but being in [place] wasn't as anxiety producing as he thought.  Pt shared that he was very busy and maybe that helped distract him from the anxiety and difficulty being away from home.  He stated that he continues to move forward w/the divorce and is anxious about what will come from it.  Pt expressed ongoing worry that his wife will not cooperate but discussion was held around pt just focusing on the things in his control.    During this session, this clinician used the following therapeutic modalities: Client-centered Therapy and Cognitive Behavioral Therapy    Substance Abuse was not addressed during this session. If the client is diagnosed with a co-occurring substance use disorder, please indicate any changes in the frequency or amount of use: unknown. Stage of change for addressing substance use diagnoses: No substance use/Not applicable    ASSESSMENT:  Baron presents with a Euthymic/ normal mood. Baron's affect is Normal range and intensity, which is congruent, with their mood and the content of the session. The client has made progress on their goals as evidenced by pts improved symptoms and better able to cope with heightened anxiety per pt report.    Baron presents with a none risk of suicide, none risk of self-harm, and none risk of harm to others.    For any risk assessment that surpasses a \"low\" rating, a safety plan must be developed.    A safety plan was indicated: no  If yes, " describe in detail n/a    PLAN: Between sessions, Baron will continue to work on utilizing his calming strategies to manage his anger/irritability. At the next session, the therapist will use Client-centered Therapy and Cognitive Behavioral Therapy to address symptoms of depression/anxiety.    Behavioral Health Treatment Plan [facility]: Diagnosis and Treatment Plan explained to Baron, Baron relates understanding diagnosis and is agreeable to Treatment Plan. Yes     Depression Follow-up Plan Completed: Not applicable     Reason for visit is No chief complaint on file.     Recent Visits  No visits were found meeting these conditions.  Showing recent visits within past 7 days and meeting all other requirements  Today's Visits  Date Type Provider Dept   05/05/25 Telemedicine Lima Davis [facility] [address]   Showing today's visits and meeting all other requirements  Future Appointments  No visits were found meeting these conditions.  Showing future appointments within next 150 days and meeting all other requirements     History of Present Illness     HPI    Past Medical History   Past Medical History:   Diagnosis Date    Anxiety     Hemorrhoids      Past Surgical History:   Procedure Laterality Date    HERNIA REPAIR       Current Outpatient Medications   Medication Instructions    albuterol (PROVENTIL HFA,VENTOLIN HFA) 90 mcg/act inhaler TAKE 2 PUFFS BY MOUTH EVERY 6 HOURS AS NEEDED FOR WHEEZE    albuterol (PROVENTIL HFA,VENTOLIN HFA) 90 mcg/act inhaler TAKE 2 PUFFS BY MOUTH EVERY 6 HOURS AS NEEDED FOR WHEEZE OR FOR SHORTNESS OF BREATH    azelastine (ASTELIN) 0.1 % nasal spray 1 spray, Nasal, 2 times daily, Use in each nostril as directed    cetirizine (ZYRTEC) 10 mg, Oral, 2 times daily    clonazePAM (KLONOPIN) 1 mg, Oral, 2 times daily PRN    escitalopram (LEXAPRO) 20 mg, Oral, Daily    famotidine (PEPCID) 40 mg, Oral, Daily    fenofibrate (TRICOR) 145 mg, Oral, Daily    hydrocortisone  2.5 % cream Topical, 2 times daily    mupirocin (BACTROBAN) 2 % ointment Topical, 3 times daily     Allergies   Allergen Reactions    Penicillins Hives       Objective   There were no vitals taken for this visit.    Video Exam  Physical Exam     Administrative Statements   Encounter provider Lima Davis    The Patient is located at Home and in the following state in which I hold an active license [place].    The patient was identified by name and date of birth. Baron Lerner was informed that this is a telemedicine visit and that the visit is being conducted through the Epic Embedded platform. He agrees to proceed..  My office door was closed. No one else was in the room.  He acknowledged consent and understanding of privacy and security of the video platform. The patient has agreed to participate and understands they can discontinue the visit at any time.        Visit Time  Start Time: 1200  Stop Time: 1240  Total Visit Time: 40 minutes

## 2025-05-11 DIAGNOSIS — F41.9 ANXIETY: ICD-10-CM

## 2025-05-12 RX ORDER — CLONAZEPAM 1 MG/1
1 TABLET ORAL 2 TIMES DAILY PRN
Qty: 60 TABLET | Refills: 0 | Status: SHIPPED | OUTPATIENT
Start: 2025-05-12

## 2025-06-02 ENCOUNTER — TELEMEDICINE (OUTPATIENT)
Dept: BEHAVIORAL/MENTAL HEALTH CLINIC | Facility: CLINIC | Age: 45
End: 2025-06-02
Payer: COMMERCIAL

## 2025-06-02 DIAGNOSIS — F41.1 GAD (GENERALIZED ANXIETY DISORDER): Primary | ICD-10-CM

## 2025-06-02 PROCEDURE — 90832 PSYTX W PT 30 MINUTES: CPT | Performed by: SOCIAL WORKER

## 2025-06-02 NOTE — PSYCH
"Virtual Regular VisitName: Baron Lerner      : 1980      MRN: [id number]  Encounter Provider: Lima Davis  Encounter Date: 2025   Encounter department: [facility] [address]  :  Assessment & Plan  GUCCI (generalized anxiety disorder)             DATA: Therapist met w/pt for individual session.  Pt shared that overall things are \"the same.\"  He stated that there haven't been any updates with the divorce and he is doing his best to manage the uncomfortable home environment.  He stated that he feels he is managing his anxiety the best he can right now.  Therapist and pt discussed that he does have some \"sad days/moments\" but is able to manage his symptoms fairly well.  Therapist and pt discussed that he is trying to focus on the happy things/times.  Therapist informed pt that she is going to be transferring to another department.  Therapist and pt discussed possible options for pt.  Therapist and pt both agree that he is ready for discharge but can always reach out to his PCP to get back into integration services.      During this session, this clinician used the following therapeutic modalities: Client-centered Therapy and Cognitive Behavioral Therapy    Substance Abuse was not addressed during this session. If the client is diagnosed with a co-occurring substance use disorder, please indicate any changes in the frequency or amount of use: unknown. Stage of change for addressing substance use diagnoses: No substance use/Not applicable    ASSESSMENT:  Baron presents with a Euthymic/ normal mood. Baron's affect is Normal range and intensity, which is congruent, with their mood and the content of the session. The client has made progress on their goals as evidenced by pts improved symptoms.    Baron presents with a none risk of suicide, none risk of self-harm, and none risk of harm to others.    For any risk assessment that surpasses a \"low\" rating, " a safety plan must be developed.    A safety plan was indicated: no  If yes, describe in detail n/a    PLAN: Therapist will d/c pt due to pt's meeting goals and therapist's transfer.     Behavioral Health Treatment Plan [facility]: Diagnosis and Treatment Plan explained to Baron, Baron relates understanding diagnosis and is agreeable to Treatment Plan. Yes     Depression Follow-up Plan Completed: Not applicable     Reason for visit is No chief complaint on file.     Recent Visits  No visits were found meeting these conditions.  Showing recent visits within past 7 days and meeting all other requirements  Today's Visits  Date Type Provider Dept   06/02/25 Telemedicine Lima Davis [facility] [address]   Showing today's visits and meeting all other requirements  Future Appointments  No visits were found meeting these conditions.  Showing future appointments within next 150 days and meeting all other requirements     History of Present Illness     HPI    Past Medical History   Past Medical History[1]  Past Surgical History[2]  Current Outpatient Medications   Medication Instructions    albuterol (PROVENTIL HFA,VENTOLIN HFA) 90 mcg/act inhaler TAKE 2 PUFFS BY MOUTH EVERY 6 HOURS AS NEEDED FOR WHEEZE    albuterol (PROVENTIL HFA,VENTOLIN HFA) 90 mcg/act inhaler TAKE 2 PUFFS BY MOUTH EVERY 6 HOURS AS NEEDED FOR WHEEZE OR FOR SHORTNESS OF BREATH    azelastine (ASTELIN) 0.1 % nasal spray 1 spray, Nasal, 2 times daily, Use in each nostril as directed    cetirizine (ZYRTEC) 10 mg, Oral, 2 times daily    clonazePAM (KLONOPIN) 1 mg, Oral, 2 times daily PRN    escitalopram (LEXAPRO) 20 mg, Oral, Daily    famotidine (PEPCID) 40 mg, Oral, Daily    fenofibrate (TRICOR) 145 mg, Oral, Daily    hydrocortisone 2.5 % cream Topical, 2 times daily    mupirocin (BACTROBAN) 2 % ointment Topical, 3 times daily     Allergies[3]    Objective   There were no vitals taken for this visit.    Video Exam  Physical Exam      Administrative Statements   Encounter provider Lima Davis    The Patient is located at Home and in the following state in which I hold an active license [place].    The patient was identified by name and date of birth. Baron Lerner was informed that this is a telemedicine visit and that the visit is being conducted through the Epic Embedded platform. He agrees to proceed..  My office door was closed. No one else was in the room.  He acknowledged consent and understanding of privacy and security of the video platform. The patient has agreed to participate and understands they can discontinue the visit at any time.        Visit Time  Start Time: 1200  Stop Time: 1226  Total Visit Time: 26 minutes         [1]   Past Medical History:  Diagnosis Date    Anxiety     Hemorrhoids    [2]   Past Surgical History:  Procedure Laterality Date    HERNIA REPAIR     [3]   Allergies  Allergen Reactions    Penicillins Hives

## 2025-06-02 NOTE — BH CRISIS PLAN
Psychotherapy Discharge Summary    Preferred Name: Baron Lerner  YOB: 1980    Admission date to psychotherapy: 1/4/2022    Referred by: PCP    Presenting Problem: Anxiety    Course of treatment included : psychoeducation and individual therapy     Progress/Outcome of Treatment Goals (brief summary of course of treatment) Pt presented to therapy initially due to wanting to get off of benzodiazepines.  However, throughout pt's tx he realized that he was using them appropriately and that they were most effective to him.  He was able to learn and implement other coping skills to help manage his anxiety as well  He realized that physical activity was something that helped his overall anxiety.  Within the past year prior to discharge pt started the divorce proceedings which were tough but pt seemed to cope well throughout all the stressors.      Treatment Complications (if any): none    Treatment Progress: good    Current [facility] Provider: PCP    Discharge Medications include: Klonopin and lexapro    Discharge Date: 6/2/2025    Discharge Diagnosis:   1. GUCCI (generalized anxiety disorder)            Criteria for Discharge: completed treatment goals and objectives and is no longer in need of services          Aftercare recommendations include (include specific referral names and phone numbers, if appropriate): PCP    Prognosis: good

## 2025-06-03 ENCOUNTER — TELEPHONE (OUTPATIENT)
Dept: PSYCHIATRY | Facility: CLINIC | Age: 45
End: 2025-06-03

## 2025-06-12 DIAGNOSIS — T14.8XXA OPEN WOUND OF SKIN: ICD-10-CM

## 2025-06-12 DIAGNOSIS — F41.9 ANXIETY: ICD-10-CM

## 2025-06-12 DIAGNOSIS — R21 RASH: ICD-10-CM

## 2025-06-12 RX ORDER — CLONAZEPAM 1 MG/1
1 TABLET ORAL 2 TIMES DAILY PRN
Qty: 60 TABLET | Refills: 0 | Status: SHIPPED | OUTPATIENT
Start: 2025-06-12

## 2025-06-16 RX ORDER — MUPIROCIN 2 %
OINTMENT (GRAM) TOPICAL 3 TIMES DAILY
Qty: 30 G | Refills: 0 | Status: SHIPPED | OUTPATIENT
Start: 2025-06-16

## 2025-06-16 RX ORDER — HYDROCORTISONE 25 MG/G
CREAM TOPICAL 2 TIMES DAILY
Qty: 28 G | Refills: 0 | Status: SHIPPED | OUTPATIENT
Start: 2025-06-16

## 2025-06-24 DIAGNOSIS — B34.9 VIRAL INFECTION, UNSPECIFIED: ICD-10-CM

## 2025-06-24 RX ORDER — ALBUTEROL SULFATE 90 UG/1
INHALANT RESPIRATORY (INHALATION)
Qty: 8.5 G | Refills: 2 | Status: SHIPPED | OUTPATIENT
Start: 2025-06-24

## 2025-07-17 DIAGNOSIS — F41.9 ANXIETY: ICD-10-CM

## 2025-07-20 RX ORDER — CLONAZEPAM 1 MG/1
1 TABLET ORAL 2 TIMES DAILY PRN
Qty: 60 TABLET | Refills: 0 | Status: SHIPPED | OUTPATIENT
Start: 2025-07-20

## 2025-07-21 NOTE — TELEPHONE ENCOUNTER
Pt is already scheduled in August, but the appointment needs to be rescheduled.     I did leave a voicemail for patient to reschedule due to the provider being out of the office.

## 2025-07-27 DIAGNOSIS — F41.1 GAD (GENERALIZED ANXIETY DISORDER): ICD-10-CM

## 2025-07-28 RX ORDER — ESCITALOPRAM OXALATE 20 MG/1
20 TABLET ORAL DAILY
Qty: 30 TABLET | Refills: 0 | Status: SHIPPED | OUTPATIENT
Start: 2025-07-28

## 2025-08-18 DIAGNOSIS — F41.9 ANXIETY: ICD-10-CM

## 2025-08-19 ENCOUNTER — OFFICE VISIT (OUTPATIENT)
Dept: FAMILY MEDICINE CLINIC | Facility: CLINIC | Age: 45
End: 2025-08-19
Payer: COMMERCIAL

## 2025-08-19 VITALS
SYSTOLIC BLOOD PRESSURE: 122 MMHG | HEART RATE: 77 BPM | BODY MASS INDEX: 30.07 KG/M2 | DIASTOLIC BLOOD PRESSURE: 76 MMHG | HEIGHT: 67 IN | TEMPERATURE: 97.6 F | OXYGEN SATURATION: 97 % | WEIGHT: 191.6 LBS

## 2025-08-19 DIAGNOSIS — F41.1 GAD (GENERALIZED ANXIETY DISORDER): ICD-10-CM

## 2025-08-19 DIAGNOSIS — E78.2 MIXED HYPERLIPIDEMIA: Primary | ICD-10-CM

## 2025-08-19 DIAGNOSIS — R21 RASH: ICD-10-CM

## 2025-08-19 PROCEDURE — 99214 OFFICE O/P EST MOD 30 MIN: CPT | Performed by: FAMILY MEDICINE

## 2025-08-19 RX ORDER — CLINDAMYCIN HYDROCHLORIDE 300 MG/1
CAPSULE ORAL
COMMUNITY
Start: 2025-08-18

## 2025-08-21 RX ORDER — CLONAZEPAM 1 MG/1
1 TABLET ORAL 2 TIMES DAILY PRN
Qty: 60 TABLET | Refills: 0 | Status: SHIPPED | OUTPATIENT
Start: 2025-08-21